# Patient Record
Sex: FEMALE | Race: WHITE | Employment: OTHER | RURAL
[De-identification: names, ages, dates, MRNs, and addresses within clinical notes are randomized per-mention and may not be internally consistent; named-entity substitution may affect disease eponyms.]

---

## 2017-04-26 ENCOUNTER — LAB ONLY (OUTPATIENT)
Dept: FAMILY MEDICINE CLINIC | Age: 82
End: 2017-04-26

## 2017-04-26 DIAGNOSIS — E78.00 PURE HYPERCHOLESTEROLEMIA: Primary | ICD-10-CM

## 2017-04-26 DIAGNOSIS — I10 ESSENTIAL HYPERTENSION: ICD-10-CM

## 2017-04-26 NOTE — MR AVS SNAPSHOT
Visit Information Date & Time Provider Department Dept. Phone Encounter #  
 4/26/2017  9:50 AM CM LIVELY NURSE Teo Lopez [id number] Your Appointments 5/16/2017  3:00 PM  
New Patient with Norbert Angeles MD  
[facility] [address] [facility] Appt Note: s/r (pcp gerda)- ADD ON PER STIBAL FOR SCHEDULE EGD [facility] [address] [facility] [address] [facility], [address] [facility] Upcoming Health Maintenance Date Due DTaP/Tdap/Td series (1 - Tdap) 1/30/1953 ZOSTER VACCINE AGE 60> 1/30/1992 GLAUCOMA SCREENING Q2Y 1/30/1997 Pneumococcal 65+ Low/Medium Risk (1 of 2 - PCV13) 1/30/1997 MEDICARE YEARLY EXAM 1/30/1997 BREAST CANCER SCRN MAMMOGRAM 4/13/2017 Allergies as of 4/26/2017  Review Complete On: 2/12/2016 By: Norah Connelly MD  
  
 Severity Noted Reaction Type Reactions Statins-hmg-coa Reductase Inhibitors  09/04/2013    Other (comments) Severs leg pain(Zetia, Crestor) Current Immunizations  Reviewed on 10/15/2014 Name Date Influenza High Dose Vaccine PF 10/5/2016 Influenza Vaccine 10/13/2014 Not reviewed this visit You Were Diagnosed With   
  
 Codes Comments Pure hypercholesterolemia    -  Primary ICD-10-CM: E78.00 ICD-9-CM: 272.0 Essential hypertension     ICD-10-CM: I10 
ICD-9-CM: 401.9 Vitals OB Status Smoking Status Postmenopausal Former Smoker Preferred Pharmacy Pharmacy Name Phone [address] [facility] [phone] Your Updated Medication List  
  
   
This list is accurate as of: 4/26/17 10:12 AM.  Always use your most recent med list.  
  
  
  
  
 aspirin 81 mg tablet Take 162 mg by mouth. atorvastatin 10 mg tablet Commonly known as:  LIPITOR TAKE 1 TABLET DAILY FOR STROKE PREVENTION AND CHOLESTEROL  
  
 doxycycline 100 mg capsule Commonly known as:  VIBRAMYCIN Take 1 Cap by mouth two (2) times a day. For 7 days  
  
 lisinopril 20 mg tablet Commonly known as:  PRINIVIL, ZESTRIL  
TAKE 1 TABLET DAILY TO PREVENT STROKE AND HEART PROBLEMS PREMPRO 0.3-1.5 mg Tab Generic drug:  estrogen (conjugated)-medroxyPROGESTERone TAKE 1 TABLET DAILY We Performed the Following LIPID PANEL [39808 CPT(R)] METABOLIC PANEL, COMPREHENSIVE [03420 CPT(R)] Introducing [facility] & HEALTH SERVICES! [employer] introduces Carmudi patient portal. Now you can access parts of your medical record, email your doctor's office, and request medication refills online. 1. In your internet browser, go to [email or web address] 2. Click on the First Time User? Click Here link in the Sign In box. You will see the New Member Sign Up page. 3. Enter your Carmudi Access Code exactly as it appears below. You will not need to use this code after youve completed the sign-up process. If you do not sign up before the expiration date, you must request a new code. · Carmudi Access Code: [email or web address] Expires: 7/25/2017 10:12 AM 
 
4. Enter the last four digits of your Social Security Number (xxxx) and Date of Birth (mm/dd/yyyy) as indicated and click Submit. You will be taken to the next sign-up page. 5. Create a Carmudi ID. This will be your Carmudi login ID and cannot be changed, so think of one that is secure and easy to remember. 6. Create a Carmudi password. You can change your password at any time. 7. Enter your Password Reset Question and Answer. This can be used at a later time if you forget your password. 8. Enter your e-mail address. You will receive e-mail notification when new information is available in [address] 9. Click Sign Up. You can now view and download portions of your medical record. 10. Click the Download Summary menu link to download a portable copy of your medical information. If you have questions, please visit the Frequently Asked Questions section of the Polymer Vision website. Remember, Polymer Vision is NOT to be used for urgent needs. For medical emergencies, dial 911. Now available from your iPhone and Android! Please provide this summary of care documentation to your next provider. Your primary care clinician is listed as Mariana Humphrey. If you have any questions after today's visit, please call [phone].

## 2017-04-27 LAB
ALBUMIN SERPL-MCNC: 4.4 G/DL (ref 3.5–4.7)
ALBUMIN/GLOB SERPL: 1.9 {RATIO} (ref 1.2–2.2)
ALP SERPL-CCNC: 46 IU/L (ref 39–117)
ALT SERPL-CCNC: 24 IU/L (ref 0–32)
AST SERPL-CCNC: 27 IU/L (ref 0–40)
BILIRUB SERPL-MCNC: 0.3 MG/DL (ref 0–1.2)
BUN SERPL-MCNC: 16 MG/DL (ref 8–27)
BUN/CREAT SERPL: 22 (ref 12–28)
CALCIUM SERPL-MCNC: 9.4 MG/DL (ref 8.7–10.3)
CHLORIDE SERPL-SCNC: 103 MMOL/L (ref 96–106)
CHOLEST SERPL-MCNC: 200 MG/DL (ref 100–199)
CO2 SERPL-SCNC: 25 MMOL/L (ref 18–29)
CREAT SERPL-MCNC: 0.74 MG/DL (ref 0.57–1)
GLOBULIN SER CALC-MCNC: 2.3 G/DL (ref 1.5–4.5)
GLUCOSE SERPL-MCNC: 93 MG/DL (ref 65–99)
HDLC SERPL-MCNC: 55 MG/DL
LDLC SERPL CALC-MCNC: 107 MG/DL (ref 0–99)
POTASSIUM SERPL-SCNC: 4.2 MMOL/L (ref 3.5–5.2)
PROT SERPL-MCNC: 6.7 G/DL (ref 6–8.5)
SODIUM SERPL-SCNC: 142 MMOL/L (ref 134–144)
TRIGL SERPL-MCNC: 190 MG/DL (ref 0–149)
VLDLC SERPL CALC-MCNC: 38 MG/DL (ref 5–40)

## 2017-05-10 ENCOUNTER — TELEPHONE (OUTPATIENT)
Dept: FAMILY MEDICINE CLINIC | Age: 82
End: 2017-05-10

## 2017-05-16 ENCOUNTER — OFFICE VISIT (OUTPATIENT)
Dept: SURGERY | Age: 82
End: 2017-05-16

## 2017-05-16 VITALS
HEART RATE: 73 BPM | WEIGHT: 131 LBS | HEIGHT: 64 IN | SYSTOLIC BLOOD PRESSURE: 129 MMHG | DIASTOLIC BLOOD PRESSURE: 74 MMHG | BODY MASS INDEX: 22.36 KG/M2

## 2017-05-16 DIAGNOSIS — K21.9 GASTROESOPHAGEAL REFLUX DISEASE, ESOPHAGITIS PRESENCE NOT SPECIFIED: Primary | ICD-10-CM

## 2017-05-16 DIAGNOSIS — Z80.0 FAMILY HX OF COLON CANCER: ICD-10-CM

## 2017-06-06 ENCOUNTER — OFFICE VISIT (OUTPATIENT)
Dept: FAMILY MEDICINE CLINIC | Age: 82
End: 2017-06-06

## 2017-06-06 VITALS
OXYGEN SATURATION: 98 % | BODY MASS INDEX: 22.62 KG/M2 | SYSTOLIC BLOOD PRESSURE: 135 MMHG | HEART RATE: 82 BPM | RESPIRATION RATE: 19 BRPM | DIASTOLIC BLOOD PRESSURE: 72 MMHG | WEIGHT: 131.8 LBS

## 2017-06-06 DIAGNOSIS — R20.2 NUMBNESS AND TINGLING: ICD-10-CM

## 2017-06-06 DIAGNOSIS — F41.9 ANXIETY: Primary | ICD-10-CM

## 2017-06-06 DIAGNOSIS — I10 ESSENTIAL HYPERTENSION: ICD-10-CM

## 2017-06-06 DIAGNOSIS — R20.0 NUMBNESS AND TINGLING: ICD-10-CM

## 2017-06-06 DIAGNOSIS — Z86.73 HISTORY OF CVA (CEREBROVASCULAR ACCIDENT): ICD-10-CM

## 2017-06-06 RX ORDER — ESCITALOPRAM OXALATE 5 MG/1
5 TABLET ORAL DAILY
Qty: 30 TAB | Refills: 2 | Status: SHIPPED | OUTPATIENT
Start: 2017-06-06 | End: 2017-06-13

## 2017-06-06 RX ORDER — ESCITALOPRAM OXALATE 5 MG/1
5 TABLET ORAL DAILY
Qty: 30 TAB | Refills: 1 | Status: SHIPPED | OUTPATIENT
Start: 2017-06-06 | End: 2017-06-13 | Stop reason: SDUPTHER

## 2017-06-06 NOTE — MR AVS SNAPSHOT
Visit Information Date & Time Provider Department Dept. Phone Encounter #  
 6/6/2017  9:00 AM Koki Pedraza NP [address] [id number] Upcoming Health Maintenance Date Due DTaP/Tdap/Td series (1 - Tdap) 1[birth year]30/1953 ZOSTER VACCINE AGE 60> 1/30/1992 GLAUCOMA SCREENING Q2Y 1/30/1997 Pneumococcal 65+ Low/Medium Risk (1 of 2 - PCV13) 1/30/1997 MEDICARE YEARLY EXAM 1/30/1997 INFLUENZA AGE 9 TO ADULT 8/1/2017 BREAST CANCER SCRN MAMMOGRAM 4/27/2018 Allergies as of 6/6/2017  Review Complete On: 6/6/2017 By: Koki Pedraza NP Severity Noted Reaction Type Reactions Statins-hmg-coa Reductase Inhibitors  09/04/2013    Other (comments) Severs leg pain(Zetia, Crestor) Current Immunizations  Reviewed on 10/15/2014 Name Date Influenza High Dose Vaccine PF 10/5/2016 Influenza Vaccine 10/13/2014 Not reviewed this visit You Were Diagnosed With   
  
 Codes Comments Anxiety    -  Primary ICD-10-CM: F41.9 ICD-9-CM: 300.00 Vitals BP Pulse Resp Weight(growth percentile) SpO2 BMI  
 135/72 (BP 1 Location: Left arm, BP Patient Position: Sitting) 82 19 131 lb 12.8 oz (59.8 kg) 98% 22.62 kg/m2 OB Status Smoking Status Postmenopausal Former Smoker Vitals History BMI and BSA Data Body Mass Index Body Surface Area  
 22.62 kg/m 2 1.64 m 2 Preferred Pharmacy Pharmacy Name Phone [id number], [address] [facility] [address] [place] [phone] Your Updated Medication List  
  
   
This list is accurate as of: 6/6/17 10:24 AM.  Always use your most recent med list.  
  
  
  
  
 aspirin 81 mg tablet Take 162 mg by mouth. atorvastatin 10 mg tablet Commonly known as:  LIPITOR  
TAKE 1 TABLET DAILY FOR STROKE PREVENTION AND CHOLESTEROL * escitalopram oxalate 5 mg tablet Commonly known as:  Danie Ziegler Take 1 Tab by mouth daily. * escitalopram oxalate 5 mg tablet Commonly known as:  Faina Ángel Take 1 Tab by mouth daily. lisinopril 20 mg tablet Commonly known as:  PRINIVIL, ZESTRIL  
TAKE 1 TABLET DAILY TO PREVENT STROKE AND HEART PROBLEMS PREMPRO 0.3-1.5 mg Tab Generic drug:  estrogen (conjugated)-medroxyPROGESTERone TAKE 1 TABLET DAILY * Notice: This list has 2 medication(s) that are the same as other medications prescribed for you. Read the directions carefully, and ask your doctor or other care provider to review them with you. Prescriptions Sent to Pharmacy Refills  
 escitalopram oxalate (LEXAPRO) 5 mg tablet 1 Sig: Take 1 Tab by mouth daily. Class: Normal  
 Pharmacy: [address], [address] [facility] Ph #: [phone] Route: Oral  
 escitalopram oxalate (LEXAPRO) 5 mg tablet 2 Sig: Take 1 Tab by mouth daily. Class: Normal  
 Pharmacy: [facility]pharm[facility] #[id number] [facility], [address] Ph #: [phone] Route: Oral  
  
Patient Instructions If you have any questions regarding SkillWiz, you may call SkillWiz support at [phone]. Introducing [facility]! [employer] introduces Anywhere to Go patient portal. Now you can access parts of your medical record, email your doctor's office, and request medication refills online. 1. In your internet browser, go to [email or web address] 2. Click on the First Time User? Click Here link in the Sign In box. You will see the New Member Sign Up page. 3. Enter your Anywhere to Go Access Code exactly as it appears below. You will not need to use this code after youve completed the sign-up process. If you do not sign up before the expiration date, you must request a new code. · Anywhere to Go Access Code: [email or web address] Expires: 7/25/2017 10:12 AM 
 
4.  Enter the last four digits of your Social Security Number (xxxx) and Date of Birth (mm/dd/yyyy) as indicated and click Submit. You will be taken to the next sign-up page. 5. Create a Relead ID. This will be your Relead login ID and cannot be changed, so think of one that is secure and easy to remember. 6. Create a Relead password. You can change your password at any time. 7. Enter your Password Reset Question and Answer. This can be used at a later time if you forget your password. 8. Enter your e-mail address. You will receive e-mail notification when new information is available in [address] 9. Click Sign Up. You can now view and download portions of your medical record. 10. Click the Download Summary menu link to download a portable copy of your medical information. If you have questions, please visit the Frequently Asked Questions section of the Relead website. Remember, Relead is NOT to be used for urgent needs. For medical emergencies, dial 911. Now available from your iPhone and Android! Please provide this summary of care documentation to your next provider. Your primary care clinician is listed as Kiana Humphrey. If you have any questions after today's visit, please call [phone].

## 2017-06-06 NOTE — PROGRESS NOTES
6/12/2017    Chief Complaint   Patient presents with   [facility] follow up,  has cancer and she feels that her nerves are shot all the time.  Numbness     numbness and tingling on the left side of her body all the way down, including mouth.  Bloated     patient rescheduled colonscopy due to  going through cancer right now. HPI: Rosas Null is a 80 y.o. female. ED f/u for numbness-paresthesias left side, left arm. CT non-contrast was done: Periventricular hypoattenuation suggesting microvascular ischemic change. Diagnosed as Anxiety/Stress Reaction. She wants something to help her with stress reaction. She has been advised to get Xanex. She has a son who is a physician. She is accompanied by her granddaughter. She continues to have parethesias primarily left side, arm. She denies extremity weakness, slurred speech, and facial drooping. Her  is in treatment for cancer. He has several appts next week with oncology etc.   She was scheduled for a colonoscopy which she cancelled until after her 's situation calms down. Her first and only priority right now is him. Allergies   Allergen Reactions    Statins-Hmg-Coa Reductase Inhibitors Other (comments)     Severs leg pain(Zetia, Crestor)       Current Outpatient Prescriptions   Medication Sig Dispense Refill    escitalopram oxalate (LEXAPRO) 5 mg tablet Take 1 Tab by mouth daily. 30 Tab 1    escitalopram oxalate (LEXAPRO) 5 mg tablet Take 1 Tab by mouth daily. 30 Tab 2    lisinopril (PRINIVIL, ZESTRIL) 20 mg tablet TAKE 1 TABLET DAILY TO PREVENT STROKE AND HEART PROBLEMS 90 Tab 0    atorvastatin (LIPITOR) 10 mg tablet TAKE 1 TABLET DAILY FOR STROKE PREVENTION AND CHOLESTEROL 90 Tab 2    aspirin 81 mg tablet Take 162 mg by mouth.       PREMPRO 0.3-1.5 mg tab TAKE 1 TABLET DAILY 80 Tab 2       Past Medical History:   Diagnosis Date    Hypercholesterolemia     Stroke [facility]) 2011 Lab Results   Component Value Date/Time    Glucose 84 06/03/2017 06:45 PM    LDL, calculated 107 04/26/2017 10:09 AM    Creatinine 0.91 06/03/2017 06:45 PM       ROS:  Constitutional: No fever, chills or weight loss  Respiratory: No cough, SOB   CV: No chest pain or Palpitations  GI: No nausea, vomiting or diarrhea. : No dysuria or hematuria. Neuro: No headaches, seizures, change in mental status. Physical Exam:   VS Visit Vitals    /72 (BP 1 Location: Left arm, BP Patient Position: Sitting)    Pulse 82    Resp 19    Wt 131 lb 12.8 oz (59.8 kg)    SpO2 98%    BMI 22.62 kg/m2      General Alert,oriented X 3. NAD. Ambulates independently with steady gait. Eyes Conjunctiva and lids normal.    PERRLA, EOMI.   ENMT Mucous membranes moist.    Oropharynx: no erythema, no exudates, no lesions, normal tongue. NECK Thyroid: normal size, nontender. Trachea midline, neck symmetrical and without masses. Carotids 2+ with no bruits. No enlarged nodes. RESP Clear to auscultation and percussion. No rales, wheezes, rhonchi, or rubs. CV RRR, with no S3 or S4, no murmur, no rub. GI   Normal bowel sounds, no bruit, soft, nontender, without masses. MSKEL Normal gait and station. Normal strength and tone, no atrophy. EXT No deformity. Extremities without edema. DP and PT 2+ bilaterally. SKIN Skin warm, normal turgor.    NEURO Neurologic:  Gen: Attention normal  Language: naming, repetition, fluency normal  Memory: intact recent and remote memory  Cranial Nerves:  I: smell  Not tested    II: visual fields  Full to confrontation    II: pupils  Equal, round, reactive to light    II: optic disc  No papilledema    III,VII: ptosis  none    III,IV,VI: extraocular muscles  Full ROM    V: mastication  normal    V: facial light touch sensation  normal    VII: facial muscle function  symmetric    VIII: hearing  symmetric    IX: soft palate elevation  normal    XI: trapezius strength  5/5    XI: sternocleidomastoid strength  5/5    XI: neck flexion strength  5/5    XII: tongue  midline      Motor: normal bulk and tone, no tremor  Strength: 5/5 all four extremities  Sensory: intact to LT, PP, vibration, and temperature  Coordination: FTN intact, Rhomberg negative  Gait: normal gait including tandem   Reflexes: 2+ throughout        [facility] Judgment and insight good. Fund of knowledge is normal.   Affect is alert and attentive. 1. Anxiety  Spouse with cancer. Situational Anxiety, she is deferring her own needs to care for her . Offer support. - escitalopram oxalate (LEXAPRO) 5 mg tablet; Take 1 Tab by mouth daily. Dispense: 30 Tab; Refill: 2    2. Left sided parathesia  Do not want to assume that this is a stress/anxiety reaction. History of Previous CVA, I have no records of this episode. Discussed imaging. She does not want to schedule  Right now. She has several up-coming appts with her . Consider MRI-MRA - Basal artery insufficiency. She declines scheduling this visit. She will contact us if her symptoms increase or persist.     Orders Placed This Encounter    escitalopram oxalate (LEXAPRO) 5 mg tablet     Sig: Take 1 Tab by mouth daily. Dispense:  30 Tab     Refill:  1    escitalopram oxalate (LEXAPRO) 5 mg tablet     Sig: Take 1 Tab by mouth daily. Dispense:  30 Tab     Refill:  2     Time spent with pt: 30 min. Follow-up Disposition:  Return in about 4 weeks (around 7/4/2017).         KAM Paz

## 2017-06-13 ENCOUNTER — OFFICE VISIT (OUTPATIENT)
Dept: FAMILY MEDICINE CLINIC | Age: 82
End: 2017-06-13

## 2017-06-13 VITALS
RESPIRATION RATE: 19 BRPM | DIASTOLIC BLOOD PRESSURE: 78 MMHG | HEART RATE: 88 BPM | SYSTOLIC BLOOD PRESSURE: 136 MMHG | OXYGEN SATURATION: 95 % | HEIGHT: 64 IN | WEIGHT: 129.6 LBS | TEMPERATURE: 95.9 F | BODY MASS INDEX: 22.13 KG/M2

## 2017-06-13 DIAGNOSIS — Z71.89 ADVANCED DIRECTIVES, COUNSELING/DISCUSSION: ICD-10-CM

## 2017-06-13 DIAGNOSIS — I63.9 CEREBROVASCULAR ACCIDENT (CVA), UNSPECIFIED MECHANISM (HCC): ICD-10-CM

## 2017-06-13 DIAGNOSIS — E78.00 PURE HYPERCHOLESTEROLEMIA: ICD-10-CM

## 2017-06-13 DIAGNOSIS — Z00.00 MEDICARE ANNUAL WELLNESS VISIT, SUBSEQUENT: Primary | ICD-10-CM

## 2017-06-13 DIAGNOSIS — R20.2 NUMBNESS AND TINGLING: ICD-10-CM

## 2017-06-13 DIAGNOSIS — F41.9 ANXIETY: ICD-10-CM

## 2017-06-13 DIAGNOSIS — I10 ESSENTIAL HYPERTENSION: ICD-10-CM

## 2017-06-13 DIAGNOSIS — R20.0 NUMBNESS AND TINGLING: ICD-10-CM

## 2017-06-13 RX ORDER — BISMUTH SUBSALICYLATE 262 MG
1 TABLET,CHEWABLE ORAL DAILY
COMMUNITY

## 2017-06-13 RX ORDER — ATORVASTATIN CALCIUM 20 MG/1
20 TABLET, FILM COATED ORAL
Qty: 90 TAB | Refills: 3 | Status: SHIPPED | OUTPATIENT
Start: 2017-06-13 | End: 2017-06-27 | Stop reason: SDUPTHER

## 2017-06-13 RX ORDER — ALPRAZOLAM 0.25 MG/1
0.25 TABLET ORAL
Qty: 60 TAB | Refills: 0 | Status: SHIPPED | OUTPATIENT
Start: 2017-06-13 | End: 2017-07-18

## 2017-06-13 RX ORDER — ESCITALOPRAM OXALATE 10 MG/1
10 TABLET ORAL DAILY
Qty: 30 TAB | Refills: 5 | Status: SHIPPED | OUTPATIENT
Start: 2017-06-13 | End: 2017-06-27 | Stop reason: SDUPTHER

## 2017-06-13 NOTE — MR AVS SNAPSHOT
Visit Information Date & Time Provider Department Dept. Phone Encounter #  
 6/13/2017 10:00 AM Dustin Gupta NP [address] [id number] Upcoming Health Maintenance Date Due ZOSTER VACCINE AGE 60> 1/30/1992 GLAUCOMA SCREENING Q2Y 1/30/1997 Pneumococcal 65+ Low/Medium Risk (1 of 2 - PCV13) 1/30/1997 MEDICARE YEARLY EXAM 1/30/1997 INFLUENZA AGE 9 TO ADULT 8/1/2017 BREAST CANCER SCRN MAMMOGRAM 4/27/2018 DTaP/Tdap/Td series (2 - Td) 6/13/2027 Allergies as of 6/13/2017  Review Complete On: 6/13/2017 By: Dustin Gupta NP Severity Noted Reaction Type Reactions Statins-hmg-coa Reductase Inhibitors  09/04/2013    Other (comments) Severs leg pain(Zetia, Crestor) Current Immunizations  Reviewed on 6/13/2017 Name Date Influenza High Dose Vaccine PF 10/5/2016 Influenza Vaccine 10/13/2014 Reviewed by Sal Lozada on 6/13/2017 at 10:04 AM  
 Reviewed by Dustin Gupta NP on 6/13/2017 at 11:11 AM  
You Were Diagnosed With   
  
 Codes Comments Medicare annual wellness visit, subsequent    -  Primary ICD-10-CM: Z00.00 ICD-9-CM: V70.0 Essential hypertension     ICD-10-CM: I10 
ICD-9-CM: 401.9 Anxiety     ICD-10-CM: F41.9 ICD-9-CM: 300.00 Numbness and tingling     ICD-10-CM: R20.0, R20.2 ICD-9-CM: 782.0 Pure hypercholesterolemia     ICD-10-CM: E78.00 ICD-9-CM: 272.0 Cerebrovascular accident (CVA), unspecified mechanism ([facility] [address]     ICD-10-CM: I63.9 ICD-9-CM: 434.91 Vitals BP Pulse Temp Resp Height(growth percentile) Weight(growth percentile) 136/78 (BP 1 Location: Left arm, BP Patient Position: Sitting) 88 95.9 °F (35.5 °C) (Oral) 19 5' 4\" (1.626 m) 129 lb 9.6 oz (58.8 kg) SpO2 BMI OB Status Smoking Status 95% 22.25 kg/m2 Postmenopausal Former Smoker BMI and BSA Data  Body Mass Index Body Surface Area  
 22.25 kg/m 2 1.63 m 2  
  
  
 Preferred Pharmacy Pharmacy Name Phone [id number] [phone] Your Updated Medication List  
  
   
This list is accurate as of: 6/13/17 11:48 AM.  Always use your most recent med list.  
  
  
  
  
 ALPRAZolam 0.25 mg tablet Commonly known as:  Woo Fuse Take 1 Tab by mouth two (2) times daily as needed for Anxiety. Max Daily Amount: 0.5 mg.  
  
 aspirin 81 mg tablet Take 162 mg by mouth. atorvastatin 20 mg tablet Commonly known as:  LIPITOR Take 1 Tab by mouth nightly. escitalopram oxalate 10 mg tablet Commonly known as:  Lovenia Eng Take 1 Tab by mouth daily. lisinopril 20 mg tablet Commonly known as:  PRINIVIL, ZESTRIL  
TAKE 1 TABLET DAILY TO PREVENT STROKE AND HEART PROBLEMS  
  
 multivitamin tablet Commonly known as:  ONE A DAY Take 1 Tab by mouth daily. PREMPRO 0.3-1.5 mg Tab Generic drug:  estrogen (conjugated)-medroxyPROGESTERone TAKE 1 TABLET DAILY Prescriptions Printed Refills ALPRAZolam (XANAX) 0.25 mg tablet 0 Sig: Take 1 Tab by mouth two (2) times daily as needed for Anxiety. Max Daily Amount: 0.5 mg.  
 Class: Print Route: Oral  
  
Prescriptions Sent to Pharmacy Refills  
 escitalopram oxalate (LEXAPRO) 10 mg tablet 5 Sig: Take 1 Tab by mouth daily. Class: Normal  
 Pharmacy: [facility]/pharmacy #[id number] [address] Ph #: [phone] Route: Oral  
 atorvastatin (LIPITOR) 20 mg tablet 3 Sig: Take 1 Tab by mouth nightly. Class: Normal  
 Pharmacy: [facility]/pharmacy #[id number] Veronica Berhane, [address] [place] [address] [address] Ph #: [phone] Route: Oral  
  
Introducing [facility] & HE[facility] SERVICES! Atiya Olivo introduces [employer] patient portal. Now you can access parts of your medical record, email your doctor's office, and request medication refills online.    
 
1. In your internet browser, go to [email or web address] 2. Click on the First Time User? Click Here link in the Sign In box. You will see the New Member Sign Up page. 3. Enter your Ubiquigent Access Code exactly as it appears below. You will not need to use this code after youve completed the sign-up process. If you do not sign up before the expiration date, you must request a new code. · Ubiquigent Access Code: [email or web address] Expires: 7/25/2017 10:12 AM 
 
4. Enter the last four digits of your Social Security Number (xxxx) and Date of Birth (mm/dd/yyyy) as indicated and click Submit. You will be taken to the next sign-up page. 5. Create a Ostrovokt ID. This will be your Ubiquigent login ID and cannot be changed, so think of one that is secure and easy to remember. 6. Create a Ubiquigent password. You can change your password at any time. 7. Enter your Password Reset Question and Answer. This can be used at a later time if you forget your password. 8. Enter your e-mail address. You will receive e-mail notification when new information is available in [address] 9. Click Sign Up. You can now view and download portions of your medical record. 10. Click the Download Summary menu link to download a portable copy of your medical information. If you have questions, please visit the Frequently Asked Questions section of the Ubiquigent website. Remember, Ubiquigent is NOT to be used for urgent needs. For medical emergencies, dial 911. Now available from your iPhone and Android! Please provide this summary of care documentation to your next provider. Your primary care clinician is listed as Bogdan Humphrey. If you have any questions after today's visit, please call [phone].

## 2017-06-13 NOTE — PROGRESS NOTES
Riana Kay is a 80 y.o. female and presents for annual Medicare Wellness Visit. Aged out of screenings. Has AD:  Vishal Fan. Son. [address] [facility]  [phone] (c)     Problem List: Reviewed with patient and discussed risk factors. Patient Active Problem List   Diagnosis Code    OA (osteoarthritis) M19.90    Stroke ([facility] [address] I63.9    Menopausal state N95.1    Other and unspecified hyperlipidemia E78.5    HTN (hypertension) I10    Hyperlipidemia E78.5       Current medical providers:  Patient Care Team:  John Marshall MD as PCP - General (Family Practice)  Priyanka Arias MD (Surgery)    PSH: Reviewed with patient  Past Surgical History:   Procedure Laterality Date    HX COLONOSCOPY      HX GYN       VI,Para V,SAB i        SH: Reviewed with patient  Social History   Substance Use Topics    Smoking status: Former Smoker     Quit date: 1990    Smokeless tobacco: None    Alcohol use Yes       FH: Reviewed with patient  Family History   Problem Relation Age of Onset    No Known Problems Mother        Medications/Allergies: Reviewed with patient  Current Outpatient Prescriptions on File Prior to Visit   Medication Sig Dispense Refill    escitalopram oxalate (LEXAPRO) 5 mg tablet Take 1 Tab by mouth daily. 30 Tab 1    atorvastatin (LIPITOR) 10 mg tablet TAKE 1 TABLET DAILY FOR STROKE PREVENTION AND CHOLESTEROL 90 Tab 2    PREMPRO 0.3-1.5 mg tab TAKE 1 TABLET DAILY 84 Tab 2    aspirin 81 mg tablet Take 162 mg by mouth.  lisinopril (PRINIVIL, ZESTRIL) 20 mg tablet TAKE 1 TABLET DAILY TO PREVENT STROKE AND HEART PROBLEMS 90 Tab 0     No current facility-administered medications on file prior to visit.        Allergies   Allergen Reactions    Statins-Hmg-Coa Reductase Inhibitors Other (comments)     Severs leg pain(Zetia, Crestor)       Objective:  Visit Vitals    /78 (BP 1 Location: Left arm, BP Patient Position: Sitting)    Pulse 88    Temp 95.9 °F (35.5 °C) (Oral)    Resp 19    Ht 5' 4\" (1.626 m)    Wt 129 lb 9.6 oz (58.8 kg)    SpO2 95%    BMI 22.25 kg/m2    Body mass index is 22.25 kg/(m^2). Assessment of cognitive impairment: Alert and oriented x 4    Depression Screen:   PHQ over the last two weeks 9/28/2015   Little interest or pleasure in doing things Not at all   Feeling down, depressed or hopeless Not at all   Total Score PHQ 2 0       Fall Risk Assessment:    Fall Risk Assessment, last 12 mths 2/12/2016   Able to walk? Yes   Fall in past 12 months? No       Functional Ability:   Does the patient exhibit a steady gait? yes   How long did it take the patient to get up and walk from a sitting position? 7 seconds   Is the patient self reliant?  (ie can do own laundry, meals, household chores)  yes     Does the patient handle his/her own medications? yes     Does the patient handle his/her own money? yes     Is the patients home safe (ie good lighting, handrails on stairs and bath, etc.)? yes     Did you notice or did patient express any hearing difficulties? no     Did you notice or did patient express any vision difficulties?   no     Were distance and reading eye charts used? no       Advance Care Planning:   Patient was offered the opportunity to discuss advance care planning:  yes     Does patient have an Advance Directive:  no   If no, did you provide information on [facility] Connections? yes       Plan:  1. Medicare Wellness, Subsequent  Aged out of screenings. Declines pneumonia vaccine. 2. Advanced Directive  Has an AD. Rayray Kamara. Son. [address]  [phone] (c)   Does not want any aggressive life prolonging treatments to prolong her life. Will bring copy of AD.        Orders Placed This Encounter    multivitamin (ONE A DAY) tablet       Health Maintenance   Topic Date Due    ZOSTER VACCINE AGE 60>  01/30/1992    GLAUCOMA SCREENING Q2Y  01/30/1997    Pneumococcal 65+ Low/Medium Risk (1 of 2 - PCV13) 01/30/1997    MEDICARE YEARLY EXAM  01/30/1997    INFLUENZA AGE 9 TO ADULT  08/01/2017    BREAST CANCER SCRN MAMMOGRAM  04/27/2018    DTaP/Tdap/Td series (2 - Td) 06/13/2027    OSTEOPOROSIS SCREENING (DEXA)  Completed       *Patient verbalized understanding and agreement with the plan. A copy of the After Visit Summary with personalized health plan was given to the patient today.

## 2017-06-18 NOTE — PROGRESS NOTES
6/13/2017    Chief Complaint   Patient presents with    Annual Wellness Visit     wants to discuss anxiety    Numbness     complete left side of body. HPI: Liliana Fishman is a 80 y.o. female. 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 Her  is still hospitalized for cancer treatment. She has had a lot of anxiety related to his illness. He successfully underwent gamma knife for a brain tumor. He is going to kneed some skilled/rehab at discharge. She would like to have him go to [facility] if possible. Ultimately her goal is to bring him home, die at home. She was seen 1 wk ago for ED f/u of numbness of her left hand, face and left side. CT scan was neg. DX was anxiety reaction. She continues to have left sided numbness. She declined additional imaging, recommended MRI/MRA. She is on Lexapro 5mg for anxiety. She is having some difficulty sleeping. Allergies   Allergen Reactions    Statins-Hmg-Coa Reductase Inhibitors Other (comments)     Severs leg pain(Zetia, Crestor)       Current Outpatient Prescriptions   Medication Sig Dispense Refill    multivitamin (ONE A DAY) tablet Take 1 Tab by mouth daily.  escitalopram oxalate (LEXAPRO) 10 mg tablet Take 1 Tab by mouth daily. 30 Tab 5    ALPRAZolam (XANAX) 0.25 mg tablet Take 1 Tab by mouth two (2) times daily as needed for Anxiety. Max Daily Amount: 0.5 mg. 60 Tab 0    atorvastatin (LIPITOR) 20 mg tablet Take 1 Tab by mouth nightly. 90 Tab 3    PREMPRO 0.3-1.5 mg tab TAKE 1 TABLET DAILY 84 Tab 2    aspirin 81 mg tablet Take 162 mg by mouth.       lisinopril (PRINIVIL, ZESTRIL) 20 mg tablet TAKE 1 TABLET DAILY TO PREVENT STROKE AND HEART PROBLEMS 90 Tab 0       Past Medical History:   Diagnosis Date    Hypercholesterolemia     Stroke [facility]) 2011       Lab Results   Component Value Date/Time    Glucose 84 06/03/2017 06:45 PM    LDL, calculated 107 04/26/2017 10:09 AM    Creatinine 0.91 06/03/2017 06:45 PM ROS:  Constitutional: No fever, chills or weight loss  Respiratory: No cough, SOB   CV: No chest pain or Palpitations  GI: No nausea, vomiting or diarrhea. : No dysuria or hematuria. Neuro: No headaches, seizures, change in mental status. Physical Exam:   VS Visit Vitals    /78 (BP 1 Location: Left arm, BP Patient Position: Sitting)    Pulse 88    Temp 95.9 °F (35.5 °C) (Oral)    Resp 19    Ht 5' 4\" (1.626 m)    Wt 129 lb 9.6 oz (58.8 kg)    SpO2 95%    BMI 22.25 kg/m2      General Alert,oriented X 3. NAD. Ambulates independently with steady gait. Eyes Conjunctiva and lids normal.    PERRLA, EOMI.   ENMT Mucous membranes moist.    Oropharynx: no erythema, no exudates, no lesions, normal tongue. NECK Thyroid: normal size, nontender. Trachea midline, neck symmetrical and without masses. Carotids 2+ with no bruits. No enlarged nodes. RESP Clear to auscultation and percussion. No rales, wheezes, rhonchi, or rubs. CV RRR, with no S3 or S4, no murmur, no rub. GI   Normal bowel sounds, no bruit, soft, nontender, without masses. MSKEL Normal gait and station. Normal strength and tone, no atrophy. EXT No deformity. Extremities without edema. DP and PT 2+ bilaterally. SKIN Skin warm, normal turgor. NEURO Cranial nerves normal 2-12. No abnormal movement   PSYCH Judgment and insight good. Fund of knowledge is normal.   Affect is alert and attentive. 1. Medicare annual wellness visit, subsequent  Aged out of screenings. Declines vaccines. 2. Essential hypertension  Stable   Well controlled  Continue current regimen    3. Anxiety  Increase Lexapro to 10mg  Add low dose Alprazolam PRN. - escitalopram oxalate (LEXAPRO) 10 mg tablet; Take 1 Tab by mouth daily. Dispense: 30 Tab; Refill: 5  - ALPRAZolam (XANAX) 0.25 mg tablet; Take 1 Tab by mouth two (2) times daily as needed for Anxiety. Max Daily Amount: 0.5 mg.  Dispense: 60 Tab;  Refill: 0    4. Numbness and tingling  Refill     5. Pure hypercholesterolemia  Refill, increase dose. - atorvastatin (LIPITOR) 20 mg tablet; Take 1 Tab by mouth nightly. Dispense: 90 Tab; Refill: 3    6. Cerebrovascular accident (CVA), unspecified mechanism ([address]  Refill, increase dose. - atorvastatin (LIPITOR) 20 mg tablet; Take 1 Tab by mouth nightly. Dispense: 90 Tab; Refill: 3    7. Advanced directives, counseling/discussion  She has AD  Son is her POA. Orders Placed This Encounter    multivitamin (ONE A DAY) tablet     Sig: Take 1 Tab by mouth daily.  escitalopram oxalate (LEXAPRO) 10 mg tablet     Sig: Take 1 Tab by mouth daily. Dispense:  30 Tab     Refill:  5    ALPRAZolam (XANAX) 0.25 mg tablet     Sig: Take 1 Tab by mouth two (2) times daily as needed for Anxiety. Max Daily Amount: 0.5 mg.     Dispense:  60 Tab     Refill:  0    atorvastatin (LIPITOR) 20 mg tablet     Sig: Take 1 Tab by mouth nightly. Dispense:  90 Tab     Refill:  3       Follow-up Disposition:  Return in about 3 weeks (around 7/4/2017). Time spent with patient: 30 min.     KAM Vail

## 2017-06-18 NOTE — ACP (ADVANCE CARE PLANNING)
6/13/2017    Has an Advanced directive. Thais Garcia. Son. [address]  [phone] (c)   Does not want any aggressive life prolonging treatments to prolong her life.

## 2017-06-27 ENCOUNTER — OFFICE VISIT (OUTPATIENT)
Dept: FAMILY MEDICINE CLINIC | Age: 82
End: 2017-06-27

## 2017-06-27 VITALS
DIASTOLIC BLOOD PRESSURE: 86 MMHG | OXYGEN SATURATION: 98 % | HEIGHT: 64 IN | RESPIRATION RATE: 19 BRPM | SYSTOLIC BLOOD PRESSURE: 135 MMHG | HEART RATE: 75 BPM | BODY MASS INDEX: 21.89 KG/M2 | WEIGHT: 128.2 LBS

## 2017-06-27 DIAGNOSIS — F41.9 ANXIETY: ICD-10-CM

## 2017-06-27 DIAGNOSIS — I63.9 CEREBROVASCULAR ACCIDENT (CVA), UNSPECIFIED MECHANISM (HCC): ICD-10-CM

## 2017-06-27 DIAGNOSIS — E78.00 PURE HYPERCHOLESTEROLEMIA: ICD-10-CM

## 2017-06-27 DIAGNOSIS — I10 ESSENTIAL HYPERTENSION: Primary | ICD-10-CM

## 2017-06-27 NOTE — PROGRESS NOTES
6/27/2017    Chief Complaint   Patient presents with    Anxiety     2 week followup        HPI: Dimitris Aragon is a 80 y.o. female. 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 Her  just discharged from hospital for cancer treatment. He needs a CT of the brain and chest this week for his on-going care. Dr. Unruly Kapadia is his oncologist, Dr. Belen Medrano did the gamma knife procedure. We will contact their office and help facilitate his needs. Mrs. Linder's primary objective at this time is for her 's care. She is on Lexapro for anxiety. Her dose was increased to 10 mg. Her anxiety is improved. She has only taken 1 Xanex. Feels she does not really need it at this point, it made her a little drowsy and Lexapro is helping. Her daughter who is with her today is returning to [place] tomorrow. Her granddaughter will be with her after today. She continues to have some numbness of the left side. She does not want to pursue any further evaluation right now until her  is better. She is realistic about his prognosis. Both her  and her family find hope in his getting started on the immunotherapy infusions per Dr. Unruly Kapadia. Allergies   Allergen Reactions    Statins-Hmg-Coa Reductase Inhibitors Other (comments)     Severs leg pain(Zetia, Crestor)       Current Outpatient Prescriptions   Medication Sig Dispense Refill    lisinopril (PRINIVIL, ZESTRIL) 20 mg tablet TAKE 1 TABLET DAILY TO PREVENT STROKE AND HEART PROBLEMS 90 Tab 2    multivitamin (ONE A DAY) tablet Take 1 Tab by mouth daily.  escitalopram oxalate (LEXAPRO) 10 mg tablet Take 1 Tab by mouth daily. 30 Tab 5    atorvastatin (LIPITOR) 20 mg tablet Take 1 Tab by mouth nightly. 90 Tab 3    aspirin 81 mg tablet Take 162 mg by mouth.  ALPRAZolam (XANAX) 0.25 mg tablet Take 1 Tab by mouth two (2) times daily as needed for Anxiety.  Max Daily Amount: 0.5 mg. 60 Tab 0    PREMPRO 0.3-1.5 mg tab TAKE 1 TABLET DAILY 84 Tab 2       Past Medical History:   Diagnosis Date    Hypercholesterolemia     Stroke [facility]) 2011       Lab Results   Component Value Date/Time    Glucose 84 06/03/2017 06:45 PM    LDL, calculated 107 04/26/2017 10:09 AM    Creatinine 0.91 06/03/2017 06:45 PM       ROS:  Constitutional: No fever, chills or weight loss  Respiratory: No cough, SOB   CV: No chest pain or Palpitations  GI: No nausea, vomiting or diarrhea. : No dysuria or hematuria. Neuro: No headaches, seizures, change in mental status. Physical Exam:   VS Visit Vitals    /86 (BP 1 Location: Right arm, BP Patient Position: Sitting)    Pulse 75    Resp 19    Ht 5' 4\" (1.626 m)    Wt 128 lb 3.2 oz (58.2 kg)    SpO2 98%    BMI 22.01 kg/m2      General Alert,oriented X 3. NAD. Ambulates independently with steady gait. Eyes Conjunctiva and lids normal.    PERRLA, EOMI.   ENMT Mucous membranes moist.    Oropharynx: no erythema, no exudates, no lesions, normal tongue. NECK Thyroid: normal size, nontender. Trachea midline, neck symmetrical and without masses. Carotids 2+ with no bruits. No enlarged nodes. RESP Clear to auscultation and percussion. No rales, wheezes, rhonchi, or rubs. CV RRR, with no S3 or S4, no murmur, no rub. GI   Normal bowel sounds, no bruit, soft, nontender, without masses. MSKEL Normal gait and station. Normal strength and tone, no atrophy. EXT No deformity. Extremities without edema. DP and PT 2+ bilaterally. SKIN Skin warm, normal turgor. NEURO Cranial nerves normal 2-12. No abnormal movement   PSYCH Judgment and insight good. Fund of knowledge is normal.   Affect is alert and attentive. 1. Essential hypertension  Stable  Continue current regimen. 2. Cerebrovascular accident (CVA), unspecified mechanism ([address]  2013. Concerned that her recent numbness of the left side is cerebrovascular. She declines further evaluation at this time.  She appears neurologically stable, no change in symptoms. 3. Anxiety  Improved on [id number]. Xanex 0.25mg PRN, she has only taken 1 dose and does not feel she will need to take it. She has available. No orders of the defined types were placed in this encounter. Follow-up Disposition:  Return in about 4 weeks (around 7/25/2017). Time spent with pt. 30 min, counseling and pt education.      KAM Vail

## 2017-06-27 NOTE — MR AVS SNAPSHOT
Visit Information Date & Time Provider Department Dept. Phone Encounter #  
 6/27/2017 10:00 AM Jena Jean Baptiste, [facility] [address] [id number] Upcoming Health Maintenance Date Due INFLUENZA AGE 9 TO ADULT 8/1/2017 BREAST CANCER SCRN MAMMOGRAM 4/27/2018 Pneumococcal 65+ Low/Medium Risk (2 of 2 - PPSV23) 6/13/2018 MEDICARE YEARLY EXAM 6/14/2018 GLAUCOMA SCREENING Q2Y 6/13/2019 DTaP/Tdap/Td series (2 - Td) 6/13/2027 Allergies as of 6/27/2017  Review Complete On: 6/27/2017 By: Tish Moraes Severity Noted Reaction Type Reactions Statins-hmg-coa Reductase Inhibitors  09/04/2013    Other (comments) Severs leg pain(Zetia, Crestor) Current Immunizations  Reviewed on 6/13/2017 Name Date Influenza High Dose Vaccine PF 10/5/2016 Influenza Vaccine 10/13/2014 Not reviewed this visit Vitals BP Pulse Resp Height(growth percentile) Weight(growth percentile) SpO2  
 135/86 (BP 1 Location: Right arm, BP Patient Position: Sitting) 75 19 5' 4\" (1.626 m) 128 lb 3.2 oz (58.2 kg) 98% BMI OB Status Smoking Status 22.01 kg/m2 Postmenopausal Former Smoker Vitals History BMI and BSA Data Body Mass Index Body Surface Area 22.01 kg/m 2 1.62 m 2 Preferred Pharmacy Pharmacy Name [address] [phone] Your Updated Medication List  
  
   
This list is accurate as of: 6/27/17 10:54 AM.  Always use your most recent med list.  
  
  
  
  
 ALPRAZolam 0.25 mg tablet Commonly known as:  Marcie Tabatha Take 1 Tab by mouth two (2) times daily as needed for Anxiety. Max Daily Amount: 0.5 mg.  
  
 aspirin 81 mg tablet Take 162 mg by mouth. atorvastatin 20 mg tablet Commonly known as:  LIPITOR Take 1 Tab by mouth nightly. escitalopram oxalate 10 mg tablet Commonly known as:  Renetta Garner Take 1 Tab by mouth daily. lisinopril 20 mg tablet Commonly known as:  PRINIVIL, ZESTRIL  
TAKE 1 TABLET DAILY TO PREVENT STROKE AND HEART PROBLEMS  
  
 multivitamin tablet Commonly known as:  ONE A DAY Take 1 Tab by mouth daily. PREMPRO 0.3-1.5 mg Tab Generic drug:  estrogen (conjugated)-medroxyPROGESTERone TAKE 1 TABLET DAILY Patient Instructions If you have any questions regarding [employer], you may call [employer] support at [phone]. Introducing [facility] SERVICES! Mason[employer] introduces [employer] patient portal. Now you can access parts of your medical record, email your doctor's office, and request medication refills online. 1. In your internet browser, go to [email or web address] 2. Click on the First Time User? Click Here link in the Sign In box. You will see the New Member Sign Up page. 3. Enter your [employer] Access Code exactly as it appears below. You will not need to use this code after youve completed the sign-up process. If you do not sign up before the expiration date, you must request a new code. · [employer] Access Code: [email or web address] Expires: 7/25/2017 10:12 AM 
 
4. Enter the last four digits of your Social Security Number (xxxx) and Date of Birth (mm/dd/yyyy) as indicated and click Submit. You will be taken to the next sign-up page. 5. Create a [employer] ID. This will be your [employer] login ID and cannot be changed, so think of one that is secure and easy to remember. 6. Create a [employer] password. You can change your password at any time. 7. Enter your Password Reset Question and Answer. This can be used at a later time if you forget your password. 8. Enter your e-mail address. You will receive e-mail notification when new information is available in [address] 9. Click Sign Up. You can now view and download portions of your medical record.  
10. Click the Download Summary menu link to download a portable copy of your medical information. If you have questions, please visit the Frequently Asked Questions section of the [employer] website. Remember, [employer] is NOT to be used for urgent needs. For medical emergencies, dial 911. Now available from your iPhone and Android! Please provide this summary of care documentation to your next provider. Your primary care clinician is listed as Rubin Humphrey. If you have any questions after today's visit, please call [phone].

## 2017-06-27 NOTE — PATIENT INSTRUCTIONS
If you have any questions regarding [employer], you may call [employer] support at [phone].

## 2017-06-28 RX ORDER — ESCITALOPRAM OXALATE 10 MG/1
10 TABLET ORAL DAILY
Qty: 30 TAB | Refills: 5 | Status: SHIPPED | OUTPATIENT
Start: 2017-06-28 | End: 2017-09-06 | Stop reason: SDUPTHER

## 2017-06-28 RX ORDER — ATORVASTATIN CALCIUM 20 MG/1
20 TABLET, FILM COATED ORAL
Qty: 90 TAB | Refills: 3 | Status: SHIPPED | OUTPATIENT
Start: 2017-06-28 | End: 2017-11-13 | Stop reason: SDUPTHER

## 2017-07-11 DIAGNOSIS — R20.0 NUMBNESS: Primary | ICD-10-CM

## 2017-07-11 DIAGNOSIS — R53.1 LEFT-SIDED WEAKNESS: ICD-10-CM

## 2017-07-11 DIAGNOSIS — Z86.73 HISTORY OF CVA (CEREBROVASCULAR ACCIDENT): ICD-10-CM

## 2017-07-11 NOTE — PROGRESS NOTES
7/11/2017    Pt has been seen for past month for left sided parathesia and weakness. A MRI was recommended, pt declined to have MRI because she was involved with her 's care. Her  has terminal brain cancer. Son  Ashlyn today. Mr. Kiley Hancock is in [facility] again. His mother wants to get MRI.    Requests requisition for MRI of brain w/o contrast, fax to [facility].

## 2017-07-17 ENCOUNTER — OFFICE VISIT (OUTPATIENT)
Dept: FAMILY MEDICINE CLINIC | Age: 82
End: 2017-07-17

## 2017-07-17 ENCOUNTER — TELEPHONE (OUTPATIENT)
Dept: FAMILY MEDICINE CLINIC | Age: 82
End: 2017-07-17

## 2017-07-17 VITALS
HEART RATE: 78 BPM | RESPIRATION RATE: 20 BRPM | WEIGHT: 125.4 LBS | DIASTOLIC BLOOD PRESSURE: 68 MMHG | SYSTOLIC BLOOD PRESSURE: 134 MMHG | BODY MASS INDEX: 21.52 KG/M2 | OXYGEN SATURATION: 97 %

## 2017-07-17 DIAGNOSIS — I63.9 CEREBROVASCULAR ACCIDENT (CVA), UNSPECIFIED MECHANISM (HCC): ICD-10-CM

## 2017-07-17 DIAGNOSIS — R20.2 NUMBNESS AND TINGLING: ICD-10-CM

## 2017-07-17 DIAGNOSIS — R20.0 NUMBNESS AND TINGLING: ICD-10-CM

## 2017-07-17 DIAGNOSIS — R53.1 WEAKNESS: ICD-10-CM

## 2017-07-17 DIAGNOSIS — R20.8 DECREASED SENSE OF VIBRATION: ICD-10-CM

## 2017-07-17 DIAGNOSIS — I10 ESSENTIAL HYPERTENSION: ICD-10-CM

## 2017-07-17 DIAGNOSIS — T14.8XXA MULTIPLE SKIN TEARS: Primary | ICD-10-CM

## 2017-07-17 DIAGNOSIS — R26.9 ABNORMAL TANDEM GAIT TEST: ICD-10-CM

## 2017-07-17 RX ORDER — MUPIROCIN 20 MG/G
OINTMENT TOPICAL DAILY
Qty: 22 G | Refills: 0 | Status: SHIPPED | OUTPATIENT
Start: 2017-07-17 | End: 2017-07-18 | Stop reason: SDUPTHER

## 2017-07-17 NOTE — MR AVS SNAPSHOT
Visit Information Date & Time Provider Department Dept. Phone Encounter #  
 7/17/2017  2:30 PM HEIDE Eli 38 [phone] [id number] Your Appointments 7/25/2017 10:30 AM  
ESTABLISHED PATIENT with HEIDE Eli [facility] ([address]) Appt Note: 4 wk f/u  
 [address] [address] [phone]  
  
   
 [address] [place] [address],[address] [address]  
  
    
 7/26/2017  2:00 PM  
New Patient with Rosaura Sharma MD  
[facility] [address] [facility]) Appt Note: NP, [place]umb[place],  [id number] Dr. Nenita Ayoub, 
[id number], [address] [address]  
[address] [facility], [address] [place] [address], [address] [place] [address] [address] [phone] Upcoming Health Maintenance Date Due INFLUENZA AGE 9 TO ADULT 8/1/2017 BREAST CANCER SCRN MAMMOGRAM 4/27/2018 Pneumococcal 65+ Low/Medium Risk (2 of 2 - PPSV23) 6/13/2018 MEDICARE YEARLY EXAM 6/14/2018 GLAUCOMA SCREENING Q2Y 6/13/2019 DTaP/Tdap/Td series (2 - Td) 6/13/2027 Allergies as of 7/17/2017  Review Complete On: 7/17/2017 By: Sofia Vang NP Severity Noted Reaction Type Reactions Statins-hmg-coa Reductase Inhibitors  09/04/2013    Other (comments) Severs leg pain(Zetia, Crestor) Current Immunizations  Reviewed on 6/13/2017 Name Date Influenza High Dose Vaccine PF 10/5/2016 Influenza Vaccine 10/13/2014 Not reviewed this visit You Were Diagnosed With   
  
 Codes Comments Multiple skin tears    -  Primary ICD-10-CM: T14.8 ICD-9-CM: 879.8 Essential hypertension     ICD-10-CM: I10 
ICD-9-CM: 401.9 Cerebrovascular accident (CVA), unspecified mechanism ([facility] [address]     ICD-10-CM: I63.9 ICD-9-CM: 434.91 Numbness and tingling     ICD-10-CM: R20.0, R20.2 ICD-9-CM: 782.0 Abnormal tandem gait test     ICD-10-CM: R26.9 ICD-9-CM: 781.2 Decreased sense of vibration     ICD-10-CM: R20.8 ICD-9-CM: 782.0 Weakness     ICD-10-CM: R53.1 ICD-9-CM: 780.79 Vitals BP Pulse Resp Weight(growth percentile) SpO2 BMI  
 134/68 (BP 1 Location: Left arm, BP Patient Position: Sitting) 78 20 125 lb 6.4 oz (56.9 kg) 97% 21.52 kg/m2 OB Status Smoking Status Postmenopausal Former Smoker BMI and BSA Data Body Mass Index Body Surface Area  
 21.52 kg/m 2 1.6 m 2 Preferred Pharmacy Pharmacy Name Phone [address], [facility] [phone] Your Updated Medication List  
  
   
This list is accurate as of: 7/17/17  3:52 PM.  Always use your most recent med list.  
  
  
  
  
 ALPRAZolam 0.25 mg tablet Commonly known as:  Randolph Sathya Take 1 Tab by mouth two (2) times daily as needed for Anxiety. Max Daily Amount: 0.5 mg.  
  
 aspirin 81 mg tablet Take 81 mg by mouth. atorvastatin 20 mg tablet Commonly known as:  LIPITOR Take 1 Tab by mouth nightly. escitalopram oxalate 10 mg tablet Commonly known as:  Fredick Herring Take 1 Tab by mouth daily. lisinopril 20 mg tablet Commonly known as:  PRINIVIL, ZESTRIL  
TAKE 1 TABLET DAILY TO PREVENT STROKE AND HEART PROBLEMS  
  
 multivitamin tablet Commonly known as:  ONE A DAY Take 1 Tab by mouth daily. mupirocin 2 % ointment Commonly known as:  [facility] Apply  to affected area daily. Prescriptions Sent to Pharmacy Refills  
 mupirocin (BACTROBAN) 2 % ointment 0 Sig: Apply  to affected area daily. Class: Normal  
 Pharmacy: [address], [address] Ph #: [phone] Route: Topical  
  
We Performed the Following CBC WITH AUTOMATED DIFF [88677 CPT(R)] LYME DISEASE(B.BURGDORFERI)PCR I4400052 CPT(R)] METABOLIC PANEL, COMPREHENSIVE [44210 CPT(R)] THYROID PANEL W/TSH [27721 CPT(R)] VITAMIN B12 [id number] CPT(R)] To-Do List   
 07/17/2017 Imaging:  MRI BRAIN W WO CONT Introducing [facility]! Bryon Carl introduces [employer] patient portal. Now you can access parts of your medical record, email your doctor's office, and request medication refills online. 1. In your internet browser, go to [email or web address][email or web address]/"[employer] 2. Click on the First Time User? Click Here link in the Sign In box. You will see the New Member Sign Up page. 3. Enter your [employer] Access Code exactly as it appears below. You will not need to use this code after youve completed the sign-up process. If you do not sign up before the expiration date, you must request a new code. · [employer] Access Code: [email or web address] Expires: 7/25/2017 10:12 AM 
 
4. Enter the last four digits of your Social Security Number (xxxx) and Date of Birth (mm/dd/yyyy) as indicated and click Submit. You will be taken to the next sign-up page. 5. Create a BigDeal ID. This will be your [employer]Deal login ID and cannot be changed, so think of one that is secure and easy to remember. 6. Create a [employer] password. You can change your password at any time. 7. Enter your Password Reset Question and Answer. This can be used at a later time if you forget your password. 8. Enter your e-mail address. You will receive e-mail notification when new information is available in [address] 9. Click Sign Up. You can now view and download portions of your medical record. 10. Click the Download Summary menu link to download a portable copy of your medical information. If you have questions, please visit the Frequently Asked Questions section of the [employer] website. Remember, BigDe[employer] is NOT to be used for urgent needs. For medical emergencies, dial 911. Now available from your iPhone and Android! Please provide this summary of care documentation to your next provider. Lyme Disease Testing Disclaimer: § 11.2-9358.3. (Expires July 1, 2018) Lyme disease testing information disclosure. A. Every licensee or his in-office designee who orders a laboratory test for the presence of Lyme disease shall provide to the patient or his legal representative the following written information: \"ACCORDING TO THE CENTERS FOR DISEASE CONTROL AND PREVENTION, AS OF 2011 LYME DISEASE IS THE SIXTH FASTEST GROWING DISEASE IN THE [place]. YOUR HEALTH CARE PROVIDER HAS ORDERED A LABORATORY TEST FOR THE PRESENCE OF LYME DISEASE FOR YOU. CURRENT LABORATORY TESTING FOR LYME DISEASE CAN BE PROBLEMATIC AND STANDARD LABORATORY TESTS OFTEN RESULT IN FALSE NEGATIVE AND FALSE POSITIVE RESULTS, AND IF DONE TOO EARLY, YOU MAY NOT HAVE PRODUCED ENOUGH ANTIBODIES TO BE CONSIDERED POSITIVE BECAUSE YOUR IMMUNE RESPONSE REQUIRES TIME TO DEVELOP ANTIBODIES. IF YOU ARE TESTED FOR LYME DISEASE, AND THE RESULTS ARE NEGATIVE, THIS DOES NOT NECESSARILY MEAN YOU DO NOT HAVE LYME DISEASE. IF YOU CONTINUE TO EXPERIENCE SYMPTOMS, YOU SHOULD CONTACT YOUR HEALTH CARE PROVIDER AND INQUIRE ABOUT THE APPROPRIATENESS OF RETESTING OR ADDITIONAL TREATMENT. \"  
B. Licensees shall be immune from civil liability for the provision of the written information required by this section absent gross negligence or willful misconduct. Your primary care clinician is listed as Cinthya Humphrey. If you have any questions after today's visit, please call [phone].

## 2017-07-17 NOTE — TELEPHONE ENCOUNTER
Spoke with patient,   last week, now needs to be seen for a numbness inher side, discussed with Jean-Pierre Caceres, scheduled

## 2017-07-18 ENCOUNTER — OFFICE VISIT (OUTPATIENT)
Dept: NEUROLOGY | Age: 82
End: 2017-07-18

## 2017-07-18 VITALS
DIASTOLIC BLOOD PRESSURE: 78 MMHG | WEIGHT: 125.4 LBS | HEIGHT: 64 IN | BODY MASS INDEX: 21.41 KG/M2 | OXYGEN SATURATION: 97 % | SYSTOLIC BLOOD PRESSURE: 128 MMHG | HEART RATE: 68 BPM

## 2017-07-18 DIAGNOSIS — I67.89 CEREBRAL MICROVASCULAR DISEASE: ICD-10-CM

## 2017-07-18 DIAGNOSIS — R79.9 ABNORMAL FINDING OF BLOOD CHEMISTRY: ICD-10-CM

## 2017-07-18 DIAGNOSIS — I63.89 OTHER CEREBRAL INFARCTION (HCC): ICD-10-CM

## 2017-07-18 DIAGNOSIS — I63.9 CEREBRAL INFARCTION, UNSPECIFIED MECHANISM (HCC): ICD-10-CM

## 2017-07-18 DIAGNOSIS — R20.0 LEFT SIDED NUMBNESS: Primary | ICD-10-CM

## 2017-07-18 DIAGNOSIS — I65.23 STENOSIS OF BOTH INTERNAL CAROTID ARTERIES: Primary | ICD-10-CM

## 2017-07-18 DIAGNOSIS — I10 ESSENTIAL HYPERTENSION: ICD-10-CM

## 2017-07-18 RX ORDER — MUPIROCIN 20 MG/G
OINTMENT TOPICAL DAILY
Qty: 22 G | Refills: 0 | Status: SHIPPED | OUTPATIENT
Start: 2017-07-18 | End: 2017-08-14 | Stop reason: SDUPTHER

## 2017-07-18 NOTE — PROGRESS NOTES
7/17/2017    Chief Complaint   Patient presents with    Numbness       HPI: Ms.Berit WENDI Westfall is a 80 y.o. female. [employer] [place], originally from [place]. Her  just passed away last week from metastatic melanoma. She is doing very well. She feels that everything went as well as possible, the children were there, he was comfortable. She presents today for f/u of left sided dysthesias. She has a \"numb\" feeling in her left qrm/ hand, and leg. This began 6/3/17. She was under a lot of stress from her 's illness. He had just had gamma knife and beginning chemotherapy. She declined having any imaging studies at that time as she was so involved with her  and wanted to defer until he was more stable. She was indeed very anxious and started on SSRI and PRN Alprazolam for situational anxiety. That did improve her anxiety however she had persistent dysthesias on the left side. Since her 's loss, she is now able to pursue evaluation for herself. Her son is an ED physician and recommended that she see neurology. She also had a fall. She was walking up the front steps to her home and fell. Multiple skin tears of the left pre-tibia. She has been putting antibiotic ointment on it. MRI 5/6/2011  Small left thalamus infarct. Extensive chronic ischemic disease, white matter and atrophy. Allergies   Allergen Reactions    Statins-Hmg-Coa Reductase Inhibitors Other (comments)     Severs leg pain(Zetia, Crestor)       Current Outpatient Prescriptions   Medication Sig Dispense Refill    mupirocin (BACTROBAN) 2 % ointment Apply  to affected area daily. 22 g 0    escitalopram oxalate (LEXAPRO) 10 mg tablet Take 1 Tab by mouth daily. 30 Tab 5    atorvastatin (LIPITOR) 20 mg tablet Take 1 Tab by mouth nightly.  90 Tab 3    lisinopril (PRINIVIL, ZESTRIL) 20 mg tablet TAKE 1 TABLET DAILY TO PREVENT STROKE AND HEART PROBLEMS 90 Tab 2    multivitamin (ONE A DAY) tablet Take 1 Tab by mouth daily.      aspirin 81 mg tablet Take 81 mg by mouth.  ALPRAZolam (XANAX) 0.25 mg tablet Take 1 Tab by mouth two (2) times daily as needed for Anxiety. Max Daily Amount: 0.5 mg. 60 Tab 0       Past Medical History:   Diagnosis Date    Hypercholesterolemia     Stroke [facility]) 2011       Lab Results   Component Value Date/Time    Glucose 84 06/03/2017 06:45 PM    LDL, calculated 107 04/26/2017 10:09 AM    Creatinine 0.91 06/03/2017 06:45 PM       ROS:  Constitutional: No fever, chills or weight loss  Respiratory: No cough, SOB   CV: No chest pain or Palpitations  GI: No nausea, vomiting or diarrhea. : No dysuria or hematuria. Neuro: No headaches, seizures, change in mental status. Physical Exam:   VS Visit Vitals    /68 (BP 1 Location: Left arm, BP Patient Position: Sitting)    Pulse 78    Resp 20    Wt 125 lb 6.4 oz (56.9 kg)    SpO2 97%    BMI 21.52 kg/m2      General Alert,oriented X 3. NAD. Eyes Conjunctiva and lids normal.    PERRLA, EOMI.   ENMT Mucous membranes moist.    Oropharynx: no erythema, no exudates, no lesions, normal tongue. NECK Thyroid: normal size, nontender. Trachea midline, neck symmetrical and without masses. Carotids 2+ with no bruits. No enlarged nodes. RESP Clear to auscultation and percussion. No rales, wheezes, rhonchi, or rubs. CV RRR, with no S3 or S4, no murmur, no rub. IVÁN Ambulates independently with steady gait. Rises from chair smoothly. Normal strength and tone, no atrophy. EXT No deformity. Extremities without edema. DP and PT 2+ bilaterally. SKIN Skin warm, normal turgor. NEURO Cranial nerves normal 2-12. Negative Rhomberg. Abnormal tandem gait. Left Lower Extremity:  Absent vibration and positional sensation in left foot. PSYCH Judgment and insight good. Fund of knowledge is normal.   Affect is alert and attentive. 1. Multiple skin tears  Cleanse with NS. Mupirocin ointment BID    2. Essential hypertension  Stable   Check labs. - CBC WITH AUTOMATED DIFF  - METABOLIC PANEL, COMPREHENSIVE  - VITAMIN B12  - THYROID PANEL W/TSH  - MRI BRAIN W WO CONT; Future    3. Cerebrovascular accident (CVA), unspecified mechanism ([address]  Check labs. Check MRI. Reviewed MRI of 5/2011 Previous infarct of left thalamus   - METABOLIC PANEL, COMPREHENSIVE  - VITAMIN B12  - THYROID PANEL W/TSH  - MRI BRAIN W WO CONT; Future  - REFERRAL TO NEUROLOGY    4. Numbness and tingling  Persistent parathesia X 6 wks    - METABOLIC PANEL, COMPREHENSIVE  - VITAMIN B12  - THYROID PANEL W/TSH  - LYME DISEASE(B.BURGDORFERI)PCR  - MRI BRAIN W WO CONT; Future  - REFERRAL TO NEUROLOGY    5. Abnormal tandem gait test  Check labs. Check MRI  Refer to neuro  - METABOLIC PANEL, COMPREHENSIVE  - VITAMIN B12  - LYME DISEASE(B.BURGDORFERI)PCR  - MRI BRAIN W WO CONT; Future  - REFERRAL TO NEUROLOGY    6. 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   - REFERRAL TO NEUROLOGY    7. Weakness   Check labs. - LYME DISEASE(B.BURGDORFERI)PCR        Orders Placed This Encounter    MRI BRAIN W WO CONT     Standing Status:   Future     Standing Expiration Date:   8/17/2018     Scheduling Instructions:      [facility]     Order Specific Question:   Is Patient Allergic to Contrast Dye? Answer:   No    CBC WITH AUTOMATED DIFF    METABOLIC PANEL, COMPREHENSIVE    VITAMIN B12    THYROID PANEL W/TSH   Tivis Abelson LYME DISEASE(B.BURGDORFERI)PCR     Order Specific Question:   Specimen type     Answer:   Serum [[id number]]    REFERRAL TO NEUROLOGY     Referral Priority:   Routine     Referral Type:   Consultation     Referral Reason:   Specialty Services Required     Requested Specialty:   Neurology    DISCONTD: mupirocin (BACTROBAN) 2 % ointment     Sig: Apply  to affected area daily. Dispense:  22 g     Refill:  0    mupirocin (BACTROBAN) 2 % ointment     Sig: Apply  to affected area daily.      Dispense:  22 g Refill:  0       Follow-up Disposition:  Return in about 2 weeks (around 7/31/2017).         KAM Keen

## 2017-07-18 NOTE — PROGRESS NOTES
Neurology Consultation Note  REFERRED BY:  Delores Sherwood MD    17    Chief Complaint   Patient presents with    New Patient     Left Sided 4-6 weeks       HISTORY OF PRESENT ILLNESS  Consuelo Bianchi is a 80 y.o. female who presented to the neurology office for management of left-sided numbness. The patient states that the symptoms started around 5 weeks ago. It was sudden in onset and it improved overnight and the next day she had recurrence of the symptoms and since that time it has been persistent. There has been no improvement in there are no modifying factors. There is no associated weakness. She does have history of stroke that happened in  and does have hypertension and hyperlipidemia. Current Outpatient Prescriptions   Medication Sig    mupirocin (BACTROBAN) 2 % ointment Apply  to affected area daily.  escitalopram oxalate (LEXAPRO) 10 mg tablet Take 1 Tab by mouth daily.  atorvastatin (LIPITOR) 20 mg tablet Take 1 Tab by mouth nightly.  lisinopril (PRINIVIL, ZESTRIL) 20 mg tablet TAKE 1 TABLET DAILY TO PREVENT STROKE AND HEART PROBLEMS    multivitamin (ONE A DAY) tablet Take 1 Tab by mouth daily.  aspirin 81 mg tablet Take 81 mg by mouth. No current facility-administered medications for this visit.       Allergies   Allergen Reactions    Statins-Hmg-Coa Reductase Inhibitors Other (comments)     Severs leg pain(Zetia, Crestor)     Past Medical History:   Diagnosis Date    Essential hypertension     Hypercholesterolemia     Stroke ([facility] [address]      Past Surgical History:   Procedure Laterality Date    HX COLONOSCOPY  2010    HX GYN       VI,Para V,SAB i     Family History   Problem Relation Age of Onset    No Known Problems Mother      Social History   Substance Use Topics    Smoking status: Former Smoker     Quit date: 1990    Smokeless tobacco: Never Used    Alcohol use Yes       REVIEW OF SYSTEMS:   A ten system review of constitutional, cardiovascular, respiratory, musculoskeletal, endocrine, skin, SHEENT, genitourinary, psychiatric and neurologic systems was obtained and is unremarkable with the exception of anxiety, falls, hearing loss, memory loss, muscle weakness     EXAMINATION:   Visit Vitals    /78    Pulse 68    Ht 5' 4\" (1.626 m)    Wt 125 lb 6.4 oz (56.9 kg)    SpO2 97%    BMI 21.52 kg/m2        General:   General appearance: Pt is in no acute distress   Distal pulses are preserved  Fundoscopic Exam: Normal    Neurological Examination:   Mental Status: AAO x3. Speech is fluent. Follows commands, has normal fund of knowledge, attention, short term recall, comprehension and insight. Cranial Nerves: Visual fields are full. PERRL, Extraocular movements are full. Facial sensation intact V1- V3. Facial movement intact, symmetric. Hearing intact to conversation. Palate elevates symmetrically. Shoulder shrug symmetric. Tongue midline. Motor: Strength is 5/5 in all 4 ext. No atrophy. Tone: Normal    Sensation: Decreased pinprick sensation in the left lower extremity    Reflexes: DTRs 2+ throughout. Coordination/Cerebellar: Intact to finger-nose-finger     Gait: Casual gait is normal.     Skin: No significant bruising or lacerations. Laboratory review:   Results for orders placed or performed during the hospital encounter of 06/03/17   CBC WITH AUTOMATED DIFF   Result Value Ref Range    WBC 5.8 3.6 - 11.0 K/uL    RBC 4.16 3.80 - 5.20 M/uL    HGB 12.7 11.5 - 16.0 g/dL    HCT 38.0 35.0 - 47.0 %    MCV 91.3 80.0 - 99.0 FL    MCH 30.5 26.0 - 34.0 PG    MCHC 33.4 30.0 - 36.5 g/dL    RDW 14.0 11.5 - 14.5 %    PLATELET 976 230 - 951 K/uL    NEUTROPHILS 47 32 - 75 %    LYMPHOCYTES 40 12 - 49 %    MONOCYTES 11 5 - 13 %    EOSINOPHILS 2 0 - 7 %    BASOPHILS 0 0 - 1 %    ABS. NEUTROPHILS 2.7 1.8 - 8.0 K/UL    ABS. LYMPHOCYTES 2.3 0.8 - 3.5 K/UL    ABS. MONOCYTES 0.7 0.0 - 1.0 K/UL    ABS. EOSINOPHILS 0.1 0.0 - 0.4 K/UL    ABS. BASOPHILS 0.0 0.0 - 0.1 K/UL   CK W/ CKMB & INDEX   Result Value Ref Range    CK 92 26 - 192 U/L    CK - MB 1.1 <3.6 NG/ML    CK-MB Index 1.2 0 - 2.5     METABOLIC PANEL, COMPREHENSIVE   Result Value Ref Range    Sodium 141 136 - 145 mmol/L    Potassium 3.6 3.5 - 5.1 mmol/L    Chloride 104 97 - 108 mmol/L    CO2 26 21 - 32 mmol/L    Anion gap 11 5 - 15 mmol/L    Glucose 84 65 - 100 mg/dL    BUN 20 (H) 7.0 - 18.0 MG/DL    Creatinine 0.91 0.55 - 1.02 MG/DL    BUN/Creatinine ratio 22 (H) 12 - 20      GFR est AA >60 >60 ml/min/1.73m2    GFR est non-AA 59 (L) >60 ml/min/1.73m2    Calcium 8.7 8.5 - 10.1 MG/DL    Bilirubin, total 0.4 0.2 - 1.0 MG/DL    ALT (SGPT) 46 14 - 63 U/L    AST (SGOT) 34 15 - 37 U/L    Alk.  phosphatase 47 45 - 117 U/L    Protein, total 7.5 6.4 - 8.2 g/dL    Albumin 4.0 3.5 - 5.0 g/dL    Globulin 3.5 2.0 - 4.0 g/dL    A-G Ratio 1.1 1.1 - 2.2     TROPONIN I   Result Value Ref Range    Troponin-I, Qt. <0.04 <0.08 ng/mL   MAGNESIUM   Result Value Ref Range    Magnesium 2.2 1.6 - 2.4 mg/dL   URINALYSIS W/MICROSCOPIC   Result Value Ref Range    Color YELLOW/STRAW      Appearance CLEAR CLEAR      Specific gravity 1.020 1.003 - 1.030      pH (UA) 6.5 5.0 - 8.0      Protein NEGATIVE  NEG mg/dL    Glucose NEGATIVE  NEG mg/dL    Ketone NEGATIVE  NEG mg/dL    Bilirubin NEGATIVE  NEG      Blood TRACE (A) NEG      Urobilinogen 0.2 0.2 - 1.0 EU/dL    Nitrites NEGATIVE  NEG      Leukocyte Esterase SMALL (A) NEG      WBC 10-20 0 - 4 /hpf    RBC 0-5 0 - 5 /hpf    Epithelial cells FEW FEW /lpf    Bacteria 1+ (A) NEG /hpf   EKG, 12 LEAD, INITIAL   Result Value Ref Range    Ventricular Rate 68 BPM    Atrial Rate 68 BPM    P-R Interval 178 ms    QRS Duration 92 ms    Q-T Interval 420 ms    QTC Calculation (Bezet) 446 ms    Calculated P Axis 47 degrees    Calculated R Axis -11 degrees    Calculated T Axis 29 degrees    Diagnosis       Normal sinus rhythm  Minimal voltage criteria for LVH, may be normal variant  Borderline ECG  No previous ECGs available  Confirmed by Diana Santamaria MD, --- ([id number]) on 6/4/2017 6:43:50 AM         Imaging review:  6/3/2017  CT scan of the head without contrast  Periventricular hypoattenuation suggesting microvascular ischemic change  I reviewed the images    Documentation review:  I reviewed the notes from the primary care physician from 7/17/2017. The patient's  just passed away last week from metastatic melanoma. She is doing very well. She presents today for follow-up of left-sided dysesthesia. She has a numb feeling on the left arm, hand and leg. This began on 6/3/2017. She declined having any imaging studies at this time as she was so involved that has been and wanted to defer that. She had MRI in 2011 which showed a small left thalamic infarct. We will refer to neurology for further management. Assessment/Plan:   Arnulfo Kang is a 80 y.o. female who presented to the neurology office for management of new onset left-sided numbness. It involves the face and the body as well. It has been going on for 5 weeks with no worsening. She is also under a lot of stress at this time. Considering right thalamic infarction versus stress reaction. We will get MRI of the brain without contrast.  Also have ordered carotid ultrasound and a transthoracic echo. We also going to get blood work including HbA1c and lipid panel. In the meanwhile, will continue aspirin 81 mg a day and present dose of statin. Follow-up in 3 months      ICD-10-CM ICD-9-CM    1. Left sided numbness R20.0 782.0 LIPID PANEL      HEMOGLOBIN A1C W/O EAG      2D ECHO COMPLETE ADULT (TTE) W OR WO CONTR      DUPLEX CAROTID BILATERAL AMB NEURO      MRI BRAIN WO CONT      DUPLEX CAROTID BILATERAL AMB NEURO   2. Essential hypertension I10 401.9    3. Cerebral infarction, unspecified mechanism ([address]  I63.9 434.91 LIPID PANEL   4. Abnormal finding of blood chemistry  R79.9 790.6 HEMOGLOBIN A1C W/O EAG   5.  Other cerebral infarction ([facility]  I63.8 434.91 2D ECHO COMPLETE ADULT (TTE) W OR WO CONTR      Thank you for allowing me to participate in the care of Ms. Atiya Proctor. Please feel free to contact me if you have any questions. Jacklynn Sicard, MD  Neurologist    CC: Terri Al MD  Fax: [phone]    This note was created using voice recognition software. Despite editing, there may be syntax errors. This note will not be viewable in [address]

## 2017-07-18 NOTE — LETTER
2017 3:15 PM 
 
Patient:    Zach Gutierres YOB: 1932 Date of Visit:    2017 Dear Aby Coffey MD 
 
Thank you for referring Ms. Evelene Siemens to me for evaluation/treatment. Below are the relevant portions of my assessment and plan of care. Neurology Consultation Note REFERRED BY: 
Aby Coffey MD 
 
17 Chief Complaint Patient presents with  New Patient Left Sided 4-6 weeks HISTORY OF PRESENT ILLNESS Zach Gutierres is a 80 y.o. female who presented to the neurology office for management of left-sided numbness. The patient states that the symptoms started around 5 weeks ago. It was sudden in onset and it improved overnight and the next day she had recurrence of the symptoms and since that time it has been persistent. There has been no improvement in there are no modifying factors. There is no associated weakness. She does have history of stroke that happened in  and does have hypertension and hyperlipidemia. Current Outpatient Prescriptions Medication Sig  mupirocin (BACTROBAN) 2 % ointment Apply  to affected area daily.  escitalopram oxalate (LEXAPRO) 10 mg tablet Take 1 Tab by mouth daily.  atorvastatin (LIPITOR) 20 mg tablet Take 1 Tab by mouth nightly.  lisinopril (PRINIVIL, ZESTRIL) 20 mg tablet TAKE 1 TABLET DAILY TO PREVENT STROKE AND HEART PROBLEMS  multivitamin (ONE A DAY) tablet Take 1 Tab by mouth daily.  aspirin 81 mg tablet Take 81 mg by mouth. No current facility-administered medications for this visit. Allergies Allergen Reactions  Statins-Hmg-Coa Reductase Inhibitors Other (comments) Severs leg pain(Zetia, Crestor) Past Medical History:  
Diagnosis Date  Essential hypertension  Hypercholesterolemia  Stroke [facility])  Past Surgical History:  
Procedure Laterality Date  HX COLONOSCOPY    HX GYN    
  VI,Para V,SAB i Family History Problem Relation Age of Onset  No Known Problems Mother Social History Substance Use Topics  Smoking status: Former Smoker Quit date: 9/4/1990  Smokeless tobacco: Never Used  Alcohol use Yes REVIEW OF SYSTEMS:  
A ten system review of constitutional, cardiovascular, respiratory, musculoskeletal, endocrine, skin, SHEENT, genitourinary, psychiatric and neurologic systems was obtained and is unremarkable with the exception of anxiety, falls, hearing loss, memory loss, muscle weakness EXAMINATION:  
Visit Vitals  /78  Pulse 68  Ht 5' 4\" (1.626 m)  Wt 125 lb 6.4 oz (56.9 kg)  SpO2 97%  BMI 21.52 kg/m2 General:  
General appearance: Pt is in no acute distress Distal pulses are preserved Fundoscopic Exam: Normal 
 
Neurological Examination:  
Mental Status: AAO x3. Speech is fluent. Follows commands, has normal fund of knowledge, attention, short term recall, comprehension and insight. Cranial Nerves: Visual fields are full. PERRL, Extraocular movements are full. Facial sensation intact V1- V3. Facial movement intact, symmetric. Hearing intact to conversation. Palate elevates symmetrically. Shoulder shrug symmetric. Tongue midline. Motor: Strength is 5/5 in all 4 ext. No atrophy. Tone: Normal 
 
Sensation: Decreased pinprick sensation in the left lower extremity Reflexes: DTRs 2+ throughout. Coordination/Cerebellar: Intact to finger-nose-finger Gait: Casual gait is normal.  
 
Skin: No significant bruising or lacerations. Laboratory review:  
Results for orders placed or performed during the hospital encounter of 06/03/17 CBC WITH AUTOMATED DIFF Result Value Ref Range WBC 5.8 3.6 - 11.0 K/uL  
 RBC 4.16 3.80 - 5.20 M/uL  
 HGB 12.7 11.5 - 16.0 g/dL HCT 38.0 35.0 - 47.0 % MCV 91.3 80.0 - 99.0 FL  
 MCH 30.5 26.0 - 34.0 PG  
 MCHC 33.4 30.0 - 36.5 g/dL  
 RDW 14.0 11.5 - 14.5 % PLATELET 515 653 - 050 K/uL NEUTROPHILS 47 32 - 75 % LYMPHOCYTES 40 12 - 49 % MONOCYTES 11 5 - 13 % EOSINOPHILS 2 0 - 7 % BASOPHILS 0 0 - 1 %  
 ABS. NEUTROPHILS 2.7 1.8 - 8.0 K/UL  
 ABS. LYMPHOCYTES 2.3 0.8 - 3.5 K/UL  
 ABS. MONOCYTES 0.7 0.0 - 1.0 K/UL  
 ABS. EOSINOPHILS 0.1 0.0 - 0.4 K/UL  
 ABS. BASOPHILS 0.0 0.0 - 0.1 K/UL  
CK W/ CKMB & INDEX Result Value Ref Range CK 92 26 - 192 U/L  
 CK - MB 1.1 <3.6 NG/ML  
 CK-MB Index 1.2 0 - 2.5 METABOLIC PANEL, COMPREHENSIVE Result Value Ref Range Sodium 141 136 - 145 mmol/L Potassium 3.6 3.5 - 5.1 mmol/L Chloride 104 97 - 108 mmol/L  
 CO2 26 21 - 32 mmol/L Anion gap 11 5 - 15 mmol/L Glucose 84 65 - 100 mg/dL BUN 20 (H) 7.0 - 18.0 MG/DL Creatinine 0.91 0.55 - 1.02 MG/DL  
 BUN/Creatinine ratio 22 (H) 12 - 20 GFR est AA >60 >60 ml/min/1.73m2 GFR est non-AA 59 (L) >60 ml/min/1.73m2 Calcium 8.7 8.5 - 10.1 MG/DL Bilirubin, total 0.4 0.2 - 1.0 MG/DL  
 ALT (SGPT) 46 14 - 63 U/L  
 AST (SGOT) 34 15 - 37 U/L Alk. phosphatase 47 45 - 117 U/L Protein, total 7.5 6.4 - 8.2 g/dL Albumin 4.0 3.5 - 5.0 g/dL Globulin 3.5 2.0 - 4.0 g/dL A-G Ratio 1.1 1.1 - 2.2    
TROPONIN I Result Value Ref Range Troponin-I, Qt. <0.04 <0.08 ng/mL MAGNESIUM Result Value Ref Range Magnesium 2.2 1.6 - 2.4 mg/dL URINALYSIS W/MICROSCOPIC Result Value Ref Range Color YELLOW/STRAW Appearance CLEAR CLEAR Specific gravity 1.020 1.003 - 1.030    
 pH (UA) 6.5 5.0 - 8.0 Protein NEGATIVE  NEG mg/dL Glucose NEGATIVE  NEG mg/dL Ketone NEGATIVE  NEG mg/dL Bilirubin NEGATIVE  NEG Blood TRACE (A) NEG Urobilinogen 0.2 0.2 - 1.0 EU/dL Nitrites NEGATIVE  NEG Leukocyte Esterase SMALL (A) NEG    
 WBC 10-20 0 - 4 /hpf  
 RBC 0-5 0 - 5 /hpf Epithelial cells FEW FEW /lpf Bacteria 1+ (A) NEG /hpf  
EKG, 12 LEAD, INITIAL Result Value Ref Range  Ventricular Rate 68 BPM  
 Atrial Rate 68 BPM  
 P-R Interval 178 ms QRS Duration 92 ms Q-T Interval 420 ms QTC Calculation (Bezet) 446 ms Calculated P Axis 47 degrees Calculated R Axis -11 degrees Calculated T Axis 29 degrees Diagnosis Normal sinus rhythm Minimal voltage criteria for LVH, may be normal variant Borderline ECG No previous ECGs available Confirmed by Dona Parsons MD, --- ([id number]) on 6/4/2017 6:43:50 AM 
  
 
 
Imaging review: 
6/3/2017 CT scan of the head without contrast 
Periventricular hypoattenuation suggesting microvascular ischemic change I reviewed the images Documentation review: I reviewed the notes from the primary care physician from 7/17/2017. The patient's  just passed away last week from metastatic melanoma. She is doing very well. She presents today for follow-up of left-sided dysesthesia. She has a numb feeling on the left arm, hand and leg. This began on 6/3/2017. She declined having any imaging studies at this time as she was so involved that has been and wanted to defer that. She had MRI in 2011 which showed a small left thalamic infarct. We will refer to neurology for further management. Assessment/Plan:  
Wil Crocker is a 80 y.o. female who presented to the neurology office for management of new onset left-sided numbness. It involves the face and the body as well. It has been going on for 5 weeks with no worsening. She is also under a lot of stress at this time. Considering right thalamic infarction versus stress reaction. We will get MRI of the brain without contrast.  Also have ordered carotid ultrasound and a transthoracic echo. We also going to get blood work including HbA1c and lipid panel. In the meanwhile, will continue aspirin 81 mg a day and present dose of statin. Follow-up in 3 months ICD-10-CM ICD-9-CM 1.  Left sided numbness R20.0 782.0 LIPID PANEL  
   HEMOGLOBIN A1C W/O EAG  
   2D ECHO COMPLETE ADULT (TTE) W OR WO CONTR  
 DUPLEX CAROTID BILATERAL AMB NEURO  
   MRI BRAIN WO CONT  
   DUPLEX CAROTID BILATERAL AMB NEURO 2. Essential hypertension I10 401.9 3. Cerebral infarction, unspecified mechanism ([address]  I63.9 434.91 LIPID PANEL 4. Abnormal finding of blood chemistry  R79.9 790.6 HEMOGLOBIN A1C W/O EAG  
5. Other cerebral infarction ([facility] [address]  I63.8 434.91 2D ECHO COMPLETE ADULT (TTE) W OR WO CONTR Thank you for allowing me to participate in the care of Ms. Guille Ku. Please feel free to contact me if you have any questions. Shweta Ba MD 
Neurologist 
 
CC: Chele Kiran MD 
Fax: [phone] This note was created using voice recognition software. Despite editing, there may be syntax errors. This note will not be viewable in [address] If you have questions, please do not hesitate to call me. I look forward to following Ms. Guille Ku along with you. Sincerely, Shweta Ba MD

## 2017-07-18 NOTE — MR AVS SNAPSHOT
Visit Information Date & Time Provider Department Dept. Phone Encounter #  
 7/18/2017  2:00 PM Calixto White MD Neurology [facility] at [facility] [phone] [id number] Your Appointments 7/25/2017 10:30 AM  
ESTABLISHED PATIENT with Jerry Raymondronn, [address] ([facility]) Appt Note: 4 wk f/u  
 [id number] [facility],5Th Floor [address] [phone]  
  
   
 [address] [address]  
  
    
 7/26/2017  2:00 PM  
New Patient with Adam Peguero MD  
Neurology Clinic at [facility]) Appt Note: NP, [place], [place] [id number] Dr. Ernestine Friedman, 
[id number], [address] [address]  
[address] [facility], [address] [place] [address] Avenue, [address] [address]  
  
    
 10/31/2017  2:40 PM  
Follow Up with Calixto White MD  
Neurology Clinic at [facility]) Appt Note: FU,CP, $0,adt,7/18/2017  
 [address] [place] [address], 
[address] [place] [address] Avenue, [address] [address]  
[address] [facility], [address] [place] [address], [address] [address] Upcoming Health Maintenance Date Due INFLUENZA AGE 9 TO ADULT 8/1/2017 BREAST CANCER SCRN MAMMOGRAM 4/27/2018 Pneumococcal 65+ Low/Medium Risk (2 of 2 - PPSV23) 6/13/2018 MEDICARE YEARLY EXAM 6/14/2018 GLAUCOMA SCREENING Q2Y 6/13/2019 DTaP/Tdap/Td series (2 - Td) 6/13/2027 Allergies as of 7/18/2017  Review Complete On: 7/18/2017 By: Calixto White MD  
  
 Severity Noted Reaction Type Reactions Statins-hmg-coa Reductase Inhibitors  09/04/2013    Other (comments) Severs leg pain(Zetia, Crestor) Current Immunizations  Reviewed on 6/13/2017 Name Date Influenza High Dose Vaccine PF 10/5/2016 Influenza Vaccine 10/13/2014 Not reviewed this visit You Were Diagnosed With   
  
 Codes Comments Left sided numbness    -  Primary ICD-10-CM: R20.0 ICD-9-CM: 782.0 Essential hypertension     ICD-10-CM: I10 
ICD-9-CM: 401.9 Cerebral infarction, unspecified mechanism ([facility] [address]     ICD-10-CM: I63.9 ICD-9-CM: 434.91 Abnormal finding of blood chemistry     ICD-10-CM: R79.9 ICD-9-CM: 790.6 Other cerebral infarction [facility])     ICD-10-CM: I63.8 ICD-9-CM: 434.91 Vitals BP Pulse Height(growth percentile) Weight(growth percentile) SpO2 BMI  
 128/78 68 5' 4\" (1.626 m) 125 lb 6.4 oz (56.9 kg) 97% 21.52 kg/m2 OB Status Smoking Status Postmenopausal Former Smoker BMI and BSA Data Body Mass Index Body Surface Area  
 21.52 kg/m 2 1.6 m 2 Preferred Pharmacy Pharmacy Name Phone [id number] Ayush, [address] [phone] Your Updated Medication List  
  
   
This list is accurate as of: 7/18/17  2:41 PM.  Always use your most recent med list.  
  
  
  
  
 aspirin 81 mg tablet Take 81 mg by mouth. atorvastatin 20 mg tablet Commonly known as:  LIPITOR Take 1 Tab by mouth nightly. escitalopram oxalate 10 mg tablet Commonly known as:  Fuller Estrin Take 1 Tab by mouth daily. lisinopril 20 mg tablet Commonly known as:  PRINIVIL, ZESTRIL  
TAKE 1 TABLET DAILY TO PREVENT STROKE AND HEART PROBLEMS  
  
 multivitamin tablet Commonly known as:  ONE A DAY Take 1 Tab by mouth daily. mupirocin 2 % ointment Commonly known as:  [facility] Apply  to affected area daily. We Performed the Following HEMOGLOBIN A1C W/O EAG [39880 CPT(R)] LIPID PANEL [15106 CPT(R)] To-Do List   
 07/18/2017 ECHO:  2D ECHO COMPLETE ADULT (TTE) W OR WO CONTR   
  
 07/18/2017 Imaging:  DUPLEX CAROTID BILATERAL AMB NEURO   
  
 07/18/2017 Imaging:  MRI BRAIN WO CONT Patient Instructions 1. MRI brain 2. Carotid ultrasound 3. Transthoracic echo 4. HbA1c and lipid panel 5. Follow-up 3 months A Healthy Lifestyle: Care Instructions Your Care Instructions A healthy lifestyle can help you feel good, stay at a healthy weight, and have plenty of energy for both work and play. A healthy lifestyle is something you can share with your whole family. A healthy lifestyle also can lower your risk for serious health problems, such as high blood pressure, heart disease, and diabetes. You can follow a few steps listed below to improve your health and the health of your family. Follow-up care is a key part of your treatment and safety. Be sure to make and go to all appointments, and call your doctor if you are having problems. Its also a good idea to know your test results and keep a list of the medicines you take. How can you care for yourself at home? · Do not eat too much sugar, fat, or fast foods. You can still have dessert and treats now and then. The goal is moderation. · Start small to improve your eating habits. Pay attention to portion sizes, drink less juice and soda pop, and eat more fruits and vegetables. ¨ Eat a healthy amount of food. A 3-ounce serving of meat, for example, is about the size of a deck of cards. Fill the rest of your plate with vegetables and whole grains. ¨ Limit the amount of soda and sports drinks you have every day. Drink more water when you are thirsty. ¨ Eat at least 5 servings of fruits and vegetables every day. It may seem like a lot, but it is not hard to reach this goal. A serving or helping is 1 piece of fruit, 1 cup of vegetables, or 2 cups of leafy, raw vegetables. Have an apple or some carrot sticks as an afternoon snack instead of a candy bar. Try to have fruits and/or vegetables at every meal. 
· Make exercise part of your daily routine. You may want to start with simple activities, such as walking, bicycling, or slow swimming. Try to be active 30 to 60 minutes every day.  You do not need to do all 30 to 60 minutes all at once. For example, you can exercise 3 times a day for 10 or 20 minutes. Moderate exercise is safe for most people, but it is always a good idea to talk to your doctor before starting an exercise program. 
· Keep moving. [employer] the lawn, work in the garden, or [employer]. Take the stairs instead of the elevator at work. · If you smoke, quit. People who smoke have an increased risk for heart attack, stroke, cancer, and other lung illnesses. Quitting is hard, but there are ways to boost your chance of quitting tobacco for good. ¨ Use nicotine gum, patches, or lozenges. ¨ Ask your doctor about stop-smoking programs and medicines. ¨ Keep trying. In addition to reducing your risk of diseases in the future, you will notice some benefits soon after you stop using tobacco. If you have shortness of breath or asthma symptoms, they will likely get better within a few weeks after you quit. · Limit how much alcohol you drink. Moderate amounts of alcohol (up to 2 drinks a day for men, 1 drink a day for women) are okay. But drinking too much can lead to liver problems, high blood pressure, and other health problems. Family health If you have a family, there are many things you can do together to improve your health. · Eat meals together as a family as often as possible. · Eat healthy foods. This includes fruits, vegetables, lean meats and dairy, and whole grains. · Include your family in your fitness plan. Most people think of activities such as jogging or tennis as the way to fitness, but there are many ways you and your family can be more active. Anything that makes you breathe hard and gets your heart pumping is exercise. Here are some tips: 
¨ Walk to do errands or to take your child to school or the bus. ¨ Go for a family bike ride after dinner instead of watching TV. Where can you learn more? Go to http://yasmani-carloz.info/. Enter W570 in the search box to learn more about \"A Healthy Lifestyle: Care Instructions. \" Current as of: July 26, 2016 Content Version: 11.3 © 0905-8498 [employer]. Care instructions adapted under license by [employer] (which disclaims liability or warranty for this information). If you have questions about a medical condition or this instruction, always ask your healthcare professional. [place] [address] any warranty or liability for your use of this information. Introducing [facility]! [employer] introduces [employer] patient portal. Now you can access parts of your medical record, email your doctor's office, and request medication refills online. 1. In your internet browser, go to [email or web address] 2. Click on the First Time User? Click Here link in the Sign In box. You will see the New Member Sign Up page. 3. Enter your [employer] Access Code exactly as it appears below. You will not need to use this code after youve completed the sign-up process. If you do not sign up before the expiration date, you must request a new code. · [employer] Access Code: [email or web address] Expires: 7/25/2017 10:12 AM 
 
4. Enter the last four digits of your Social Security Number (xxxx) and Date of Birth (mm/dd/yyyy) as indicated and click Submit. You will be taken to the next sign-up page. 5. Create a [employer] ID. This will be your [employer] login ID and cannot be changed, so think of one that is secure and easy to remember. 6. Create a [employer] password. You can change your password at any time. 7. Enter your Password Reset Question and Answer. This can be used at a later time if you forget your password. 8. Enter your e-mail address. You will receive e-mail notification when new information is available in [address] 9. Click Sign Up. You can now view and download portions of your medical record. 10. Click the Download Summary menu link to download a portable copy of your medical information. If you have questions, please visit the Frequently Asked Questions section of the [employer] website. Remember, [employer] is NOT to be used for urgent needs. For medical emergencies, dial 911. Now available from your iPhone and Android! Please provide this summary of care documentation to your next provider. Lyme Disease Testing Disclaimer:   
 § 66.8-4272.9. (Expires July 1, 2018) Lyme disease testing information disclosure. A. Every licensee or his in-office designee who orders a laboratory test for the presence of Lyme disease shall provide to the patient or his legal representative the following written information: \"ACCORDING TO THE CENTERS FOR DISEASE CONTROL AND PREVENTION, AS OF 2011 LYME DISEASE IS THE SIXTH FASTEST GROWING DISEASE IN THE [place]. YOUR HEALTH CARE PROVIDER HAS ORDERED A LABORATORY TEST FOR THE PRESENCE OF LYME DISEASE FOR YOU. CURRENT LABORATORY TESTING FOR LYME DISEASE CAN BE PROBLEMATIC AND STANDARD LABORATORY TESTS OFTEN RESULT IN FALSE NEGATIVE AND FALSE POSITIVE RESULTS, AND IF DONE TOO EARLY, YOU MAY NOT HAVE PRODUCED ENOUGH ANTIBODIES TO BE CONSIDERED POSITIVE BECAUSE YOUR IMMUNE RESPONSE REQUIRES TIME TO DEVELOP ANTIBODIES. IF YOU ARE TESTED FOR LYME DISEASE, AND THE RESULTS ARE NEGATIVE, THIS DOES NOT NECESSARILY MEAN YOU DO NOT HAVE LYME DISEASE. IF YOU CONTINUE TO EXPERIENCE SYMPTOMS, YOU SHOULD CONTACT YOUR HEALTH CARE PROVIDER AND INQUIRE ABOUT THE APPROPRIATENESS OF RETESTING OR ADDITIONAL TREATMENT. \"  
B. Licensees shall be immune from civil liability for the provision of the written information required by this section absent gross negligence or willful misconduct. Your primary care clinician is listed as Cinthya Humphrey. If you have any questions after today's visit, please call [phone].

## 2017-07-18 NOTE — PATIENT INSTRUCTIONS
1. MRI brain  2. Carotid ultrasound  3. Transthoracic echo  4. HbA1c and lipid panel  5. Follow-up 3 months      A Healthy Lifestyle: Care Instructions  Your Care Instructions  A healthy lifestyle can help you feel good, stay at a healthy weight, and have plenty of energy for both work and play. A healthy lifestyle is something you can share with your whole family. A healthy lifestyle also can lower your risk for serious health problems, such as high blood pressure, heart disease, and diabetes. You can follow a few steps listed below to improve your health and the health of your family. Follow-up care is a key part of your treatment and safety. Be sure to make and go to all appointments, and call your doctor if you are having problems. Its also a good idea to know your test results and keep a list of the medicines you take. How can you care for yourself at home? · Do not eat too much sugar, fat, or fast foods. You can still have dessert and treats now and then. The goal is moderation. · Start small to improve your eating habits. Pay attention to portion sizes, drink less juice and soda pop, and eat more fruits and vegetables. ¨ Eat a healthy amount of food. A 3-ounce serving of meat, for example, is about the size of a deck of cards. Fill the rest of your plate with vegetables and whole grains. ¨ Limit the amount of soda and sports drinks you have every day. Drink more water when you are thirsty. ¨ Eat at least 5 servings of fruits and vegetables every day. It may seem like a lot, but it is not hard to reach this goal. A serving or helping is 1 piece of fruit, 1 cup of vegetables, or 2 cups of leafy, raw vegetables. Have an apple or some carrot sticks as an afternoon snack instead of a candy bar. Try to have fruits and/or vegetables at every meal.  · Make exercise part of your daily routine. You may want to start with simple activities, such as walking, bicycling, or slow swimming.  Try to be active 30 to 60 minutes every day. You do not need to do all 30 to 60 minutes all at once. For example, you can exercise 3 times a day for 10 or 20 minutes. Moderate exercise is safe for most people, but it is always a good idea to talk to your doctor before starting an exercise program.  · Keep moving. Melany Wesco the lawn, work in the garden, or [employer]. Take the stairs instead of the elevator at work. · If you smoke, quit. People who smoke have an increased risk for heart attack, stroke, cancer, and other lung illnesses. Quitting is hard, but there are ways to boost your chance of quitting tobacco for good. ¨ Use nicotine gum, patches, or lozenges. ¨ Ask your doctor about stop-smoking programs and medicines. ¨ Keep trying. In addition to reducing your risk of diseases in the future, you will notice some benefits soon after you stop using tobacco. If you have shortness of breath or asthma symptoms, they will likely get better within a few weeks after you quit. · Limit how much alcohol you drink. Moderate amounts of alcohol (up to 2 drinks a day for men, 1 drink a day for women) are okay. But drinking too much can lead to liver problems, high blood pressure, and other health problems. Family health  If you have a family, there are many things you can do together to improve your health. · Eat meals together as a family as often as possible. · Eat healthy foods. This includes fruits, vegetables, lean meats and dairy, and whole grains. · Include your family in your fitness plan. Most people think of activities such as jogging or tennis as the way to fitness, but there are many ways you and your family can be more active. Anything that makes you breathe hard and gets your heart pumping is exercise. Here are some tips:  ¨ Walk to do errands or to take your child to school or the bus. ¨ Go for a family bike ride after dinner instead of watching TV. Where can you learn more?   Go to http://yasmani-carloz.info/. Enter Z943 in the search box to learn more about \"A Healthy Lifestyle: Care Instructions. \"  Current as of: July 26, 2016  Content Version: 11.3  © 4563-5628 [employer] Care instructions adapted under license by [employer] (which disclaims liability or warranty for this information). If you have questions about a medical condition or this instruction, always ask your healthcare professional. [address] any warranty or liability for your use of this information.

## 2017-07-19 PROBLEM — I65.23 STENOSIS OF BOTH INTERNAL CAROTID ARTERIES: Status: ACTIVE | Noted: 2017-07-19

## 2017-07-19 PROBLEM — I67.89 CEREBRAL MICROVASCULAR DISEASE: Status: ACTIVE | Noted: 2017-07-19

## 2017-07-19 LAB
ALBUMIN SERPL-MCNC: 4.3 G/DL (ref 3.5–4.7)
ALBUMIN/GLOB SERPL: 2 {RATIO} (ref 1.2–2.2)
ALP SERPL-CCNC: 61 IU/L (ref 39–117)
ALT SERPL-CCNC: 19 IU/L (ref 0–32)
AST SERPL-CCNC: 20 IU/L (ref 0–40)
B BURGDOR DNA SPEC QL NAA+PROBE: NEGATIVE
BASOPHILS # BLD AUTO: 0 X10E3/UL (ref 0–0.2)
BASOPHILS NFR BLD AUTO: 0 %
BILIRUB SERPL-MCNC: 0.2 MG/DL (ref 0–1.2)
BUN SERPL-MCNC: 13 MG/DL (ref 8–27)
BUN/CREAT SERPL: 16 (ref 12–28)
CALCIUM SERPL-MCNC: 9.4 MG/DL (ref 8.7–10.3)
CHLORIDE SERPL-SCNC: 100 MMOL/L (ref 96–106)
CO2 SERPL-SCNC: 24 MMOL/L (ref 18–29)
CREAT SERPL-MCNC: 0.81 MG/DL (ref 0.57–1)
EOSINOPHIL # BLD AUTO: 0.1 X10E3/UL (ref 0–0.4)
EOSINOPHIL NFR BLD AUTO: 1 %
ERYTHROCYTE [DISTWIDTH] IN BLOOD BY AUTOMATED COUNT: 14.2 % (ref 12.3–15.4)
FT4I SERPL CALC-MCNC: 2 (ref 1.2–4.9)
GLOBULIN SER CALC-MCNC: 2.2 G/DL (ref 1.5–4.5)
GLUCOSE SERPL-MCNC: 100 MG/DL (ref 65–99)
HCT VFR BLD AUTO: 39.2 % (ref 34–46.6)
HGB BLD-MCNC: 13 G/DL (ref 11.1–15.9)
IMM GRANULOCYTES # BLD: 0 X10E3/UL (ref 0–0.1)
IMM GRANULOCYTES NFR BLD: 0 %
LYMPHOCYTES # BLD AUTO: 1.8 X10E3/UL (ref 0.7–3.1)
LYMPHOCYTES NFR BLD AUTO: 28 %
MCH RBC QN AUTO: 30.7 PG (ref 26.6–33)
MCHC RBC AUTO-ENTMCNC: 33.2 G/DL (ref 31.5–35.7)
MCV RBC AUTO: 93 FL (ref 79–97)
MONOCYTES # BLD AUTO: 0.5 X10E3/UL (ref 0.1–0.9)
MONOCYTES NFR BLD AUTO: 8 %
NEUTROPHILS # BLD AUTO: 4 X10E3/UL (ref 1.4–7)
NEUTROPHILS NFR BLD AUTO: 63 %
PLATELET # BLD AUTO: 246 X10E3/UL (ref 150–379)
POTASSIUM SERPL-SCNC: 4 MMOL/L (ref 3.5–5.2)
PROT SERPL-MCNC: 6.5 G/DL (ref 6–8.5)
RBC # BLD AUTO: 4.24 X10E6/UL (ref 3.77–5.28)
SODIUM SERPL-SCNC: 140 MMOL/L (ref 134–144)
T3RU NFR SERPL: 28 % (ref 24–39)
T4 SERPL-MCNC: 7.2 UG/DL (ref 4.5–12)
TSH SERPL DL<=0.005 MIU/L-ACNC: 1.86 UIU/ML (ref 0.45–4.5)
VIT B12 SERPL-MCNC: 689 PG/ML (ref 211–946)
WBC # BLD AUTO: 6.4 X10E3/UL (ref 3.4–10.8)

## 2017-07-19 NOTE — PROCEDURES
This study consisted of pulsed wave Doppler examination, Color-flow imaging, and Duplex imaging of both the right and left carotid systems, and both vertebral arteries.        Imaging of both right and left carotid systems showed no significant plaquing at the bifurcations and proximal and distal internal and external carotid arteries bilaterally, with stenosis in the range of 0-15 % only and with no flow abnormalities identified.        Both vertebral arteries showed normal antegrade flow.         Clinical correlation recommended

## 2017-07-25 ENCOUNTER — DOCUMENTATION ONLY (OUTPATIENT)
Dept: FAMILY MEDICINE CLINIC | Age: 82
End: 2017-07-25

## 2017-07-25 NOTE — PROGRESS NOTES
7/25/2017    Patient called for MRI results:  Ordered by Dr. Meri Duque    1. 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 Currently on ASA 81mg  Her BP has been well controlled. Monotherapy with clopidogrel (Plavix) 75mg daily is preferred over ASA.        Lord Forrest DNP, FNP-BC

## 2017-08-01 ENCOUNTER — OFFICE VISIT (OUTPATIENT)
Dept: FAMILY MEDICINE CLINIC | Age: 82
End: 2017-08-01

## 2017-08-01 VITALS
BODY MASS INDEX: 21.1 KG/M2 | HEIGHT: 64 IN | RESPIRATION RATE: 17 BRPM | OXYGEN SATURATION: 97 % | WEIGHT: 123.6 LBS | HEART RATE: 74 BPM | SYSTOLIC BLOOD PRESSURE: 100 MMHG | DIASTOLIC BLOOD PRESSURE: 62 MMHG

## 2017-08-01 DIAGNOSIS — I63.9 CEREBROVASCULAR ACCIDENT (CVA), UNSPECIFIED MECHANISM (HCC): ICD-10-CM

## 2017-08-01 DIAGNOSIS — T14.8XXA MULTIPLE SKIN TEARS: Primary | ICD-10-CM

## 2017-08-01 DIAGNOSIS — R20.2 NUMBNESS AND TINGLING: ICD-10-CM

## 2017-08-01 DIAGNOSIS — R20.0 NUMBNESS AND TINGLING: ICD-10-CM

## 2017-08-01 DIAGNOSIS — I10 ESSENTIAL HYPERTENSION: ICD-10-CM

## 2017-08-01 DIAGNOSIS — F41.9 ANXIETY: ICD-10-CM

## 2017-08-01 NOTE — MR AVS SNAPSHOT
Visit Information Date & Time Provider Department Dept. Phone Encounter #  
 8/1/2017 10:30 AM Maritza Monique NP [address] [id number] Your Appointments 10/31/2017  2:40 PM  
Follow Up with Luly Slater MD  
[facility]) Appt Note: FU,CP, $0,adt,7/18/2017  
 [address] [facility], 
[address], [address] [facility] [place] [address]  
[address] , [address], [address] [facility] [place] [address] Upcoming Health Maintenance Date Due INFLUENZA AGE 9 TO ADULT 11/8/2017* BREAST CANCER SCRN MAMMOGRAM 4/27/2018 Pneumococcal 65+ Low/Medium Risk (2 of 2 - PPSV23) 6/13/2018 MEDICARE YEARLY EXAM 6/14/2018 GLAUCOMA SCREENING Q2Y 6/13/2019 DTaP/Tdap/Td series (2 - Td) 6/13/2027 *Topic was postponed. The date shown is not the original due date. Allergies as of 8/1/2017  Review Complete On: 8/1/2017 By: Glenna Elizondo Severity Noted Reaction Type Reactions Statins-hmg-coa Reductase Inhibitors  09/04/2013    Other (comments) Severs leg pain(Zetia, Crestor) Current Immunizations  Reviewed on 8/1/2017 Name Date Influenza High Dose Vaccine PF 10/5/2016 Influenza Vaccine 10/13/2014 Reviewed by Glenna Elizondo on 8/1/2017 at 10:35 AM  
Vitals BP Pulse Resp Height(growth percentile) Weight(growth percentile) SpO2  
 100/62 (BP 1 Location: Left arm, BP Patient Position: Sitting) 74 17 5' 4\" (1.626 m) 123 lb 9.6 oz (56.1 kg) 97% BMI OB Status Smoking Status 21.22 kg/m2 Postmenopausal Former Smoker Vitals History BMI and BSA Data Body Mass Index Body Surface Area  
 21.22 kg/m 2 1.59 m 2 Preferred Pharmacy Pharmacy Name Phone [id number], [address] [place] [phone] Your Updated Medication List  
  
   
 This list is accurate as of: 8/1/17 11:11 AM.  Always use your most recent med list.  
  
  
  
  
 aspirin 81 mg tablet Take 81 mg by mouth. atorvastatin 20 mg tablet Commonly known as:  LIPITOR Take 1 Tab by mouth nightly. escitalopram oxalate 10 mg tablet Commonly known as:  Alves Abts Take 1 Tab by mouth daily. lisinopril 20 mg tablet Commonly known as:  PRINIVIL, ZESTRIL  
TAKE 1 TABLET DAILY TO PREVENT STROKE AND HEART PROBLEMS  
  
 multivitamin tablet Commonly known as:  ONE A DAY Take 1 Tab by mouth daily. mupirocin 2 % ointment Commonly known as:  [facility] Apply  to affected area daily. Introducing [facility]! Justine Martinez introduces [employer] patient portal. Now you can access parts of your medical record, email your doctor's office, and request medication refills online. 1. In your internet browser, go to [email or web address] 2. Click on the First Time User? Click Here link in the Sign In box. You will see the New Member Sign Up page. 3. Enter your [employer] Access Code exactly as it appears below. You will not need to use this code after youve completed the sign-up process. If you do not sign up before the expiration date, you must request a new code. · [employer] Access Code: [email or web address] Expires: 10/24/2017  4:07 PM 
 
4. Enter the last four digits of your Social Security Number (xxxx) and Date of Birth (mm/dd/yyyy) as indicated and click Submit. You will be taken to the next sign-up page. 5. Create a [employer] ID. This will be your [employer] login ID and cannot be changed, so think of one that is secure and easy to remember. 6. Create a [employer] password. You can change your password at any time. 7. Enter your Password Reset Question and Answer. This can be used at a later time if you forget your password. 8. Enter your e-mail address.  You will receive e-mail notification when new information is available in [employer]. 9. Click Sign Up. You can now view and download portions of your medical record. 10. Click the Download Summary menu link to download a portable copy of your medical information. If you have questions, please visit the Frequently Asked Questions section of the [employer] website. Remember, [employer] is NOT to be used for urgent needs. For medical emergencies, dial 911. Now available from your iPhone and Android! Please provide this summary of care documentation to your next provider. Your primary care clinician is listed as Mandy Humphrey. If you have any questions after today's visit, please call [phone].

## 2017-08-01 NOTE — PROGRESS NOTES
8/16/2017    Chief Complaint   Patient presents with    Results     MRI results       HPI: Jatinder Lopez is a 80 y.o. female. [place], originally [place]. Her  recently passed away. She is doing very well. She feels that everything went as well as possible, the children were there, he was comfortable. She is grateful for having such a wonderful life. She presents today for f/u of left sided dysthesias. She has a \"numb\" feeling in her left qrm/ hand, and leg. This began 6/3/17, BP was ~200/100. She was under a lot of stress from her 's illness. He had just had gamma knife and beginning chemotherapy. She declined having any imaging studies at that time as she was so involved with her  and wanted to defer until he was more stable. She was indeed very anxious and started on SSRI. She has stopped the Alprazolam which she took PRN. Since her 's loss, she is now able to pursue evaluation for herself. Her son is an ED physician and recommended that she see neurology. She also had a fall. She was walking up the front steps to her home and fell. Multiple skin tears of the left pre-tibia. She has been putting antibiotic ointment on it. Is applying to [facility]. Allergies   Allergen Reactions    Statins-Hmg-Coa Reductase Inhibitors Other (comments)     Severs leg pain(Zetia, Crestor)       Current Outpatient Prescriptions   Medication Sig Dispense Refill    escitalopram oxalate (LEXAPRO) 10 mg tablet Take 1 Tab by mouth daily. 30 Tab 5    atorvastatin (LIPITOR) 20 mg tablet Take 1 Tab by mouth nightly. 90 Tab 3    lisinopril (PRINIVIL, ZESTRIL) 20 mg tablet TAKE 1 TABLET DAILY TO PREVENT STROKE AND HEART PROBLEMS 90 Tab 2    multivitamin (ONE A DAY) tablet Take 1 Tab by mouth daily.  aspirin 81 mg tablet Take 81 mg by mouth.  mupirocin (BACTROBAN) 2 % ointment Apply  to affected area daily.  22 g 0       Past Medical History:   Diagnosis Date    Essential hypertension     Hypercholesterolemia     [facility]roke [facility]) 2011       Lab Results   Component Value Date/Time    Glucose 100 07/17/2017 03:56 PM    LDL, calculated 107 04/26/2017 10:09 AM    Creatinine 0.81 07/17/2017 03:56 PM       ROS:  Constitutional: No fever, chills or weight loss  Respiratory: No cough, SOB   CV: No chest pain or Palpitations  GI: No nausea, vomiting or diarrhea. : No dysuria or hematuria. Neuro: No headaches, seizures, change in mental status. Physical Exam:   VS Visit Vitals    /62 (BP 1 Location: Left arm, BP Patient Position: Sitting)    Pulse 74    Resp 17    Ht 5' 4\" (1.626 m)    Wt 123 lb 9.6 oz (56.1 kg)    SpO2 97%    BMI 21.22 kg/m2      General Alert,oriented X 3. NAD. Ambulates independently with steady gait. Eyes Conjunctiva and lids normal.    PERRLA, EOMI.   ENMT Mucous membranes moist.    Oropharynx: no erythema, no exudates, no lesions, normal tongue. NECK Thyroid: normal size, nontender. Trachea midline, neck symmetrical and without masses. Carotids 2+ with no bruits. No enlarged nodes. RESP Clear to auscultation and percussion. No rales, wheezes, rhonchi, or rubs. CV RRR, with no S3 or S4, no murmur, no rub. GI   Normal bowel sounds, no bruit, soft, nontender, without masses. MSKEL Normal gait and station. Normal strength and tone, no atrophy. EXT DJD hands. Extremities without edema. DP and PT 2+ bilaterally. SKIN Skin warm, normal turgor. Left pre-tibia: multiple skin tears with local ,0.5 inch surrounding erythema. NEURO Cranial nerves normal 2-12. No abnormal movement   PSYCH Judgment and insight good. Fund of knowledge is normal.   Affect is alert and attentive. 1. Multiple skin tears  Sustained in fall. Mupirocin ointment BID     2. Essential hypertension  Stable well controlled  Continue current regimen   ACE with no adverse effects     3.  Cerebrovascular accident (CVA), unspecified mechanism ([facility] [address]  Small infarct Left basal ganglia. Residual sensory numbness of the left side     4. Numbness and tingling  Due to small CVA Left basal ganglia      5. Anxiety  Improved on Lexaprol. Reactive to her husbands serious illness and death. She is coping very well. Will consider taper discontinuation in ~ 3-6 mo. No orders of the defined types were placed in this encounter. Follow-up Disposition:  Return in about 3 months (around 11/1/2017).         KAM Seymour

## 2017-08-14 DIAGNOSIS — R20.8 DECREASED SENSE OF VIBRATION: ICD-10-CM

## 2017-08-14 RX ORDER — MUPIROCIN 20 MG/G
OINTMENT TOPICAL DAILY
Qty: 22 G | Refills: 0 | Status: SHIPPED | OUTPATIENT
Start: 2017-08-14 | End: 2018-01-22

## 2017-08-23 ENCOUNTER — OFFICE VISIT (OUTPATIENT)
Dept: FAMILY MEDICINE CLINIC | Age: 82
End: 2017-08-23

## 2017-08-23 VITALS
OXYGEN SATURATION: 96 % | SYSTOLIC BLOOD PRESSURE: 117 MMHG | HEART RATE: 73 BPM | WEIGHT: 124 LBS | TEMPERATURE: 99 F | HEIGHT: 64 IN | BODY MASS INDEX: 21.17 KG/M2 | DIASTOLIC BLOOD PRESSURE: 62 MMHG
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
## 2017-08-23 DIAGNOSIS — S62.619A: ICD-10-CM
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
## 2017-08-23 DIAGNOSIS — M79.641 HAND PAIN, RIGHT: Primary | ICD-10-CM

## 2017-08-23 DIAGNOSIS — I67.89 CEREBRAL MICROVASCULAR DISEASE: ICD-10-CM

## 2017-08-23 NOTE — PROGRESS NOTES
Carmen Faith is a 80 y.o. female presenting for/with:    Fall (right hand pain)      HPI:  Symptoms include R small finger pain x 3d. Karin Ortega after being pulled off of her feet by her dog, landed on her R outstretched arm. Wrist feels ok, but R small got bent in a funny angle and is pretty swollen, and there is a significant ecchymosis present. stable since that time. Evaluation to date: none. Treatment to date: rest, and ice. PMH, SH, Medications/Allergies: reviewed, on chart. ROS:  Constitutional: No fever, chills or weight loss  Respiratory: No cough, SOB   CV: No chest pain or Palpitations    Visit Vitals    /62    Pulse 73    Temp 99 °F (37.2 °C) (Temporal)    Ht 5' 4\" (1.626 m)    Wt 124 lb (56.2 kg)    SpO2 96%    BMI 21.28 kg/m2     Wt Readings from Last 3 Encounters:   08/23/17 124 lb (56.2 kg)   08/01/17 123 lb 9.6 oz (56.1 kg)   07/18/17 125 lb 6.4 oz (56.9 kg)     Physical Examination: General appearance - alert, well appearing, and in no distress  Mental status - alert, oriented to person, place, and time  Eyes - pupils equal and reactive, extraocular eye movements intact  ENT - bilateral external ears and nose normal. Normal lips  Extremities - peripheral pulses normal, no pedal edema, no clubbing. R small finger with moderate edema from MCP distally with moderate eccmymosis. Ring finger with moderate ecchymosis and edema. nttp to the metacarpals or carpals, and the Ring, middle, index and thumb are all nttp to the phalanges. The proximal phalanx of the small finger is quite ttp and has some crepitus without angulation. No cyanosis. Flexor and extensor cascade is intact. Healing laceration to the MCP dorsally of the middle finger    A/P:  Injury R hand/small finger. Con't monitor for healing of the laceration. Check xray R hand/finger for fx. Splint applied for comfort in safe position. If not fracture, use splint about a week, or PRN.  If fractured, but not displaced or angulated, plan routine ortho consult. If badly angulated or rotated (unlikely due to good looking exam), plan more acute ortho eval. F/U per xray. Microvascular ASCVD/CVD  Doing well on ASA and has good BP control. Doing well on lipitor 20, Plan recheck labs this fall.      F/U 3mo/PRN

## 2017-08-23 NOTE — MR AVS SNAPSHOT
Visit Information Date & Time Provider Department Dept. Phone Encounter #  
 8/23/2017  2:40 PM Magi Trejo MD [address] [place] [address] [id number] Follow-up Instructions Return in about 3 months (around 11/23/2017), or if symptoms worsen or fail to improve. Follow-up and Disposition History Your Appointments 8/29/2017  9:00 AM  
Follow Up with Wiley Severance, NP [address] ([facility] Appt Note: PER STEPHANIE/  
 [facility] [address] [facility],5[address] [address] [phone]  
  
   
 [address] [facility] [address] [place] [address],5[address] [address]  
  
    
 10/31/2017  2:40 PM  
Follow Up with Kristofer Tai MD  
Neurology [facility] at [facility]) Appt Note: DELMAR MCCURDY, $0,adt,7/18/2017  
 [address] [place] [address], 
[address] [place] [address], [address] [address]  
[address], [address] [place] [address], [address] [facility] [address] [address] Upcoming Health Maintenance Date Due INFLUENZA AGE 9 TO ADULT 11/8/2017* BREAST CANCER SCRN MAMMOGRAM 4/27/2018 Pneumococcal 65+ Low/Medium Risk (2 of 2 - PPSV23) 6/13/2018 MEDICARE YEARLY EXAM 6/14/2018 GLAUCOMA SCREENING Q2Y 6/13/2019 DTaP/Tdap/Td series (2 - Td) 6/13/2027 *Topic was postponed. The date shown is not the original due date. Allergies as of 8/23/2017  Review Complete On: 8/23/2017 By: Magi Trejo MD  
  
 Severity Noted Reaction Type Reactions Statins-hmg-coa Reductase Inhibitors  09/04/2013    Other (comments) Severs leg pain(Zetia, Crestor) Current Immunizations  Reviewed on 8/1/2017 Name Date Influenza High Dose Vaccine PF 10/5/2016 Influenza Vaccine 10/13/2014 Not reviewed this visit You Were Diagnosed With   
  
 Codes Comments Hand pain, right    -  Primary ICD-10-CM: M79.641 ICD-9-CM: 729.5 Fall at home, initial encounter     ICD-10-CM: W19. Daysi Castellon, H62.967 
ICD-9-CM: E888.9, [id number] Cerebral microvascular disease     ICD-10-CM: I67.9 ICD-9-CM: 437.9 Vitals BP Pulse Temp Height(growth percentile) Weight(growth percentile) SpO2  
 117/62 73 99 °F (37.2 °C) (Temporal) 5' 4\" (1.626 m) 124 lb (56.2 kg) 96% BMI OB Status Smoking Status 21.28 kg/m2 Postmenopausal Former Smoker BMI and BSA Data Body Mass Index Body Surface Area  
 21.28 kg/m 2 1.59 m 2 Preferred Pharmacy Pharmacy Name Phone [id number] [address] [facility] [phone] Your Updated Medication List  
  
   
This list is accurate as of: 8/23/17  4:20 PM.  Always use your most recent med list.  
  
  
  
  
 aspirin 81 mg tablet Take 81 mg by mouth. atorvastatin 20 mg tablet Commonly known as:  LIPITOR Take 1 Tab by mouth nightly. escitalopram oxalate 10 mg tablet Commonly known as:  Colen Brunner Take 1 Tab by mouth daily. lisinopril 20 mg tablet Commonly known as:  PRINIVIL, ZESTRIL  
TAKE 1 TABLET DAILY TO PREVENT STROKE AND HEART PROBLEMS  
  
 multivitamin tablet Commonly known as:  ONE A DAY Take 1 Tab by mouth daily. mupirocin 2 % ointment Commonly known as:  [facility] Apply  to affected area daily. Follow-up Instructions Return in about 3 months (around 11/23/2017), or if symptoms worsen or fail to improve. To-Do List   
 08/23/2017 Imaging:  XR HAND RT MIN 3 V Patient Instructions Hand Sprain: Care Instructions Your Care Instructions A hand sprain occurs when you stretch or tear a ligament in your hand. Ligaments are the tough tissues that connect one bone to another. Most hand sprains will heal with treatment you can do at home. Follow-up care is a key part of your treatment and safety. Be sure to make and go to all appointments, and call your doctor if you are having problems.  It's also a good idea to know your test results and keep a list of the medicines you take. How can you care for yourself at home? · If your doctor gave you a splint or immobilizer, wear it as directed. This will help keep swelling down and help your hand heal. 
· Follow your doctor's directions for exercise and other activity. · For the first 2 days after your injury, avoid things that might increase swelling, such as hot showers, hot tubs, or hot packs. · Put ice or a cold pack on your hand for 10 to 20 minutes at a time to stop swelling. Try this every 1 to 2 hours for 3 days (when you are awake) or until the swelling goes down. Put a thin cloth between the ice pack and your skin. Keep your splint dry. · After 2 or 3 days, if your swelling is gone, put a heating pad (set on low) or a warm cloth on your hand. Some experts suggest that you go back and forth between hot and cold treatments. · Prop up your hand on a pillow when you ice it or anytime you sit or lie down. Try to keep it above the level of your heart. This will help reduce swelling. · Take pain medicines exactly as directed. ¨ If the doctor gave you a prescription medicine for pain, take it as prescribed. ¨ If you are not taking a prescription pain medicine, ask your doctor if you can take an over-the-counter medicine. · Return to your usual level of activity slowly. When should you call for help? Call your doctor now or seek immediate medical care if: 
· Your pain is worse. · You have new or increased swelling in your hand. · You cannot move your hand. · You have tingling, weakness, or numbness in your hand or fingers. · Your hand or fingers are cool or pale or change color. · You have a fever. · Your hand or fingers are red. Watch closely for changes in your health, and be sure to contact your doctor if: 
· Your hand does not get better as expected. Where can you learn more? Go to [email or web address]-carloz.[email or web address]  
Enter U634 in the search box to learn more about \"Hand Sprain: Care Instructions. \" Current as of: March 21, 2017 Content Version: 11.3 © 1092-9956 [employer]. Care instructions adapted under license by [employer] (which disclaims liability or warranty for this information). If you have questions about a medical condition or this instruction, always ask your healthcare professional. [address] any warranty or liability for your use of this information. Introducing [facility] & HE[facility]! Guillermo Orona introduces [employer] patient portal. Now you can access parts of your medical record, email your doctor's office, and request medication refills online. 1. In your internet browser, go to [email or web address] 2. Click on the First Time User? Click Here link in the Sign In box. You will see the New Member Sign Up page. 3. Enter your [employer] Access Code exactly as it appears below. You will not need to use this code after youve completed the sign-up process. If you do not sign up before the expiration date, you must request a new code. · [employer] Access Code: [email or web address] Expires: 10/24/2017  4:07 PM 
 
4. Enter the last four digits of your Social Security Number (xxxx) and Date of Birth (mm/dd/yyyy) as indicated and click Submit. You will be taken to the next sign-up page. 5. Create a [employer] ID. This will be your [employer] login ID and cannot be changed, so think of one that is secure and easy to remember. 6. Create a [employer] password. You can change your password at any time. 7. Enter your Password Reset Question and Answer. This can be used at a later time if you forget your password. 8. Enter your e-mail address. You will receive e-mail notification when new information is available in [address] 9. Click Sign Up. You can now view and download portions of your medical record. 10. Click the Download Summary menu link to download a portable copy of your medical information. If you have questions, please visit the Frequently Asked Questions section of the SomethingIndiet website. Remember, BLOVES is NOT to be used for urgent needs. For medical emergencies, dial 911. Now available from your iPhone and Android! Please provide this summary of care documentation to your next provider. Your primary care clinician is listed as Tommy Humphrey. If you have any questions after today's visit, please call [phone].

## 2017-08-29 ENCOUNTER — OFFICE VISIT (OUTPATIENT)
Dept: FAMILY MEDICINE CLINIC | Age: 82
End: 2017-08-29

## 2017-08-29 VITALS
HEART RATE: 77 BPM | RESPIRATION RATE: 19 BRPM | TEMPERATURE: 97.5 F | OXYGEN SATURATION: 98 % | DIASTOLIC BLOOD PRESSURE: 82 MMHG | SYSTOLIC BLOOD PRESSURE: 132 MMHG | WEIGHT: 123 LBS | BODY MASS INDEX: 21.11 KG/M2

## 2017-08-29 DIAGNOSIS — I10 ESSENTIAL HYPERTENSION: Primary | ICD-10-CM

## 2017-08-29 DIAGNOSIS — S62.619A: ICD-10-CM

## 2017-08-29 RX ORDER — ALPRAZOLAM 0.25 MG/1
TABLET ORAL
COMMUNITY
Start: 2017-06-13 | End: 2017-11-13 | Stop reason: ALTCHOICE

## 2017-08-29 NOTE — PROGRESS NOTES
9/6/2017    Chief Complaint   Patient presents with    Finger Swelling     broken pinky finger on right hand. HPI: Samuel Greer is a 80 y.o. female. [place], originally from [place]. Her  recently passed away. She is doing very well. She feels that everything went as well as possible, the children were there, he was comfortable. She is grateful for having such a wonderful life. Resilient and amazing woman. She presents today for f/u of left sided dysthesias. She has a \"numb\" feeling in her left qrm/ hand, and leg. This began 6/3/17, BP was ~200/100. She was under a lot of stress from her 's illness. She has been on Escitalopram for anxiety depression and is feeling well. She has discontinued the Alprazolam.     MRI demonstrated a small infarct of the left basal ganglia. She has persistent numbness and dysthesia of the left side upper and lower extremity. No significant motor defects. She had a fall with pre-tibial abtrasions using Mupirocin ointment, resolving. Her Right 5th finger is swollen and tender. Seen by Dr. Moy Hassan ~ 5 days ago after a fall walking dog. Has a fracture of the 5th proximal digit. She has no pain and full ROM. Good . She does not want any further treatment. She is planning to move to [facility] and needs admission application and medical form completed. She continues to have her daughter and her granddaughter staying with her. Allergies   Allergen Reactions    Statins-Hmg-Coa Reductase Inhibitors Other (comments)     Other reaction(s): Other (comments)  Severs leg pain(Zetia, Crestor)  Severs leg pain(Zetia, Crestor)       Current Outpatient Prescriptions   Medication Sig Dispense Refill    ALPRAZolam (XANAX) 0.25 mg tablet TAKE 1 TABLET BY MOUTH TWICE A DAY AS NEEDED FOR ANXIETY (MAX 2 A DAY)      mupirocin (BACTROBAN) 2 % ointment Apply  to affected area daily.  22 g 0    escitalopram oxalate (LEXAPRO) 10 mg tablet Take 1 Tab by mouth daily. 30 Tab 5    atorvastatin (LIPITOR) 20 mg tablet Take 1 Tab by mouth nightly. 90 Tab 3    lisinopril (PRINIVIL, ZESTRIL) 20 mg tablet TAKE 1 TABLET DAILY TO PREVENT STROKE AND HEART PROBLEMS 90 Tab 2    multivitamin (ONE A DAY) tablet Take 1 Tab by mouth daily.  aspirin 81 mg tablet Take 81 mg by mouth. Past Medical History:   Diagnosis Date    Essential hypertension     Hypercholesterolemia     Stroke [facility]) 2011       Lab Results   Component Value Date/Time    Glucose 100 07/17/2017 03:56 PM    LDL, calculated 107 04/26/2017 10:09 AM    Creatinine 0.81 07/17/2017 03:56 PM       ROS:  Constitutional: No fever, chills or weight loss  Respiratory: No cough, SOB   CV: No chest pain or Palpitations  GI: No nausea, vomiting or diarrhea. : No dysuria or hematuria. Neuro: No headaches, seizures, change in mental status. Physical Exam:   VS Visit Vitals    /82 (BP 1 Location: Right arm, BP Patient Position: Sitting)    Pulse 77    Temp 97.5 °F (36.4 °C) (Oral)    Resp 19    Wt 123 lb (55.8 kg)    SpO2 98%    BMI 21.11 kg/m2      General Alert,oriented X 3. NAD. Ambulates independently with steady gait. Eyes Conjunctiva and lids normal.    PERRLA, EOMI.   ENMT Mucous membranes moist.    Oropharynx: no erythema, no exudates, no lesions, normal tongue. NECK Thyroid: normal size, nontender. Trachea midline, neck symmetrical and without masses. Carotids 2+ with no bruits. No enlarged nodes. RESP Clear to auscultation and percussion. No rales, wheezes, rhonchi, or rubs. CV RRR, with no S3 or S4, no murmur, no rub. GI   Normal bowel sounds, no bruit, soft, nontender, without masses. MSKEL Normal gait and station. Normal strength and tone, no atrophy. EXT 5th finger has minimal swelling and resolving ecchymosis. Non-tender with full ROM  5+ equal.      SKIN Skin warm, normal turgor. NEURO Cranial nerves normal 2-12.        PSYCH Judgment and insight good. Fund of knowledge is normal.   Affect is alert and attentive. 1. Essential hypertension  Well controlled. 2. Fracture of proximal phalanx of digit of right hand  Watchful waiting. Orders Placed This Encounter    ALPRAZolam (XANAX) 0.25 mg tablet     Sig: TAKE 1 TABLET BY MOUTH TWICE A DAY AS NEEDED FOR ANXIETY (MAX 2 A DAY)       Follow-up Disposition:  Return if symptoms worsen or fail to improve.         Dea Poag, ARNP

## 2017-08-29 NOTE — MR AVS SNAPSHOT
Visit Information Date & Time Provider Department Dept. Phone Encounter #  
 8/29/2017  9:00 AM Concepción Marshall NP [facility] [id number] Your Appointments 10/31/2017  2:40 PM  
Follow Up with Payal Neely MD  
Neurology Clinic at [facility]) Appt Note: FU,CP, $0,adt,7/18/2017  
 [address] [place] [address], 
[address] [place] [address], [address] [address]  
[address], [address], [address] [address] Upcoming Health Maintenance Date Due INFLUENZA AGE 9 TO ADULT 11/8/2017* BREAST CANCER SCRN MAMMOGRAM 4/27/2018 Pneumococcal 65+ Low/Medium Risk (2 of 2 - PPSV23) 6/13/2018 MEDICARE YEARLY EXAM 6/14/2018 GLAUCOMA SCREENING Q2Y 6/13/2019 DTaP/Tdap/Td series (2 - Td) 6/13/2027 *Topic was postponed. The date shown is not the original due date. Allergies as of 8/29/2017  Review Complete On: 8/29/2017 By: Concepción Marshall NP Severity Noted Reaction Type Reactions Statins-hmg-coa Reductase Inhibitors  09/04/2013    Other (comments) Other reaction(s): Other (comments) Severs leg pain(Zetia, Crestor) Severs leg pain(Zetia, Crestor) Current Immunizations  Reviewed on 8/29/2017 Name Date Influenza High Dose Vaccine PF 10/5/2016 Influenza Vaccine 10/13/2014 Reviewed by David Wick on 8/29/2017 at  9:36 AM  
 Reviewed by David Wick on 8/29/2017 at  9:37 AM  
Vitals BP Pulse Temp Resp Weight(growth percentile) SpO2  
 132/82 (BP 1 Location: Right arm, BP Patient Position: Sitting) 77 97.5 °F (36.4 °C) (Oral) 19 123 lb (55.8 kg) 98% BMI OB Status Smoking Status 21.11 kg/m2 Postmenopausal Former Smoker Vitals History BMI and BSA Data Body Mass Index Body Surface Area  
 21.11 kg/m 2 1.59 m 2 Preferred Pharmacy Pharmacy Name Phone [id number] [address] [facility] [phone] Your Updated Medication List  
  
   
This list is accurate as of: 8/29/17 11:10 AM.  Always use your most recent med list.  
  
  
  
  
 ALPRAZolam 0.25 mg tablet Commonly known as:  XANAX  
TAKE 1 TABLET BY MOUTH TWICE A DAY AS NEEDED FOR ANXIETY (MAX 2 A DAY) aspirin 81 mg tablet Take 81 mg by mouth. atorvastatin 20 mg tablet Commonly known as:  LIPITOR Take 1 Tab by mouth nightly. escitalopram oxalate 10 mg tablet Commonly known as:  Dmitri Carolus Take 1 Tab by mouth daily. lisinopril 20 mg tablet Commonly known as:  PRINIVIL, ZESTRIL  
TAKE 1 TABLET DAILY TO PREVENT STROKE AND HEART PROBLEMS  
  
 multivitamin tablet Commonly known as:  ONE A DAY Take 1 Tab by mouth daily. mupirocin 2 % ointment Commonly known as:  [facility] Apply  to affected area daily. Introducing \[facility]\"" & HEALTH SERVICES! Marine Ly introduces [employer] patient portal. Now you can access parts of your medical record, email your doctor's office, and request medication refills online. 1. In your internet browser, go to [email or web address] 2. Click on the First Time User? Click Here link in the Sign In box. You will see the New Member Sign Up page. 3. Enter your [employer] Access Code exactly as it appears below. You will not need to use this code after youve completed the sign-up process. If you do not sign up before the expiration date, you must request a new code. · [employer] Access Code: [email or web address] Expires: 10/24/2017  4:07 PM 
 
4. Enter the last four digits of your Social Security Number (xxxx) and Date of Birth (mm/dd/yyyy) as indicated and click Submit. You will be taken to the next sign-up page. 5. Create a [employer] ID. This will be your [employer] login ID and cannot be changed, so think of one that is secure and easy to remember. 6. Create a Mostro password. You can change your password at any time. 7. Enter your Password Reset Question and Answer. This can be used at a later time if you forget your password. 8. Enter your e-mail address. You will receive e-mail notification when new information is available in [address] 9. Click Sign Up. You can now view and download portions of your medical record. 10. Click the Download Summary menu link to download a portable copy of your medical information. If you have questions, please visit the Frequently Asked Questions section of the Most[employer] website. Remember, Most[employer] is NOT to be used for urgent needs. For medical emergencies, dial 911. Now available from your iPhone and Android! Please provide this summary of care documentation to your next provider. Your primary care clinician is listed as Ascencion Humphrey. If you have any questions after today's visit, please call [phone].

## 2017-09-06 DIAGNOSIS — F41.9 ANXIETY: ICD-10-CM

## 2017-09-06 RX ORDER — ESCITALOPRAM OXALATE 10 MG/1
10 TABLET ORAL DAILY
Qty: 30 TAB | Refills: 5 | Status: SHIPPED | OUTPATIENT
Start: 2017-09-06 | End: 2017-10-04 | Stop reason: SDUPTHER

## 2017-09-06 NOTE — TELEPHONE ENCOUNTER
[phone] contact # per Richardson Chanel, need refill called in to [facility] for the following medications:  Lxapro 10 mg tablets  1tab/day  Only 4 pills left acn you please place on rush order.   Thanks,

## 2017-12-08 NOTE — PROGRESS NOTES
[facility]   [facility], [address] [place] [address]  Phone: [phone] Fax: [phone]                                              OFFICE NOTE    NAME: Alex Sharma  : 1932    Chief Complaint:   Chief Complaint   Patient presents with    GERD     acid indigeston   family history of colon cancer    History: Alex Sharma is a 80 y.o. WHITE OR  female referred for GI complaints. She has been having a lot of lower gas with belching. She eats well and has no dysphagia. She has never had an EGD and is not on a PPI. She needs an EGD and colonoscopy. This is  the patient's second colonoscopy. The previous one was normal.  The last colonoscopy was in . The bowel movements are regular and brown. She does not use any meds on a regular basis for her bowels. She denies any blood in stools or hemorrhoidal disease. Family history is positive for father with colon cancer or colon polyps. Past Medical History:   Diagnosis Date    Essential hypertension     Hypercholesterolemia     [facility]roke [facility])      Past Surgical History:   Procedure Laterality Date    HX COLONOSCOPY      HX GYN       VI,Para V,SAB i        Current Outpatient Prescriptions   Medication Sig Dispense Refill    aspirin 81 mg tablet Take 81 mg by mouth.  escitalopram oxalate (LEXAPRO) 10 mg tablet Take 1 Tab by mouth daily. 90 Tab 3    atorvastatin (LIPITOR) 20 mg tablet Take 1 Tab by mouth nightly. 90 Tab 3    lisinopril (PRINIVIL, ZESTRIL) 20 mg tablet Take 1 Tab by mouth daily. Bedtime. 90 Tab 3    mupirocin (BACTROBAN) 2 % ointment Apply  to affected area daily. 22 g 0    multivitamin (ONE A DAY) tablet Take 1 Tab by mouth daily. Allergies   Allergen Reactions    Statins-Hmg-Coa Reductase Inhibitors Other (comments)     Other reaction(s):  Other (comments)  Severs leg pain(Zetia, Crestor)  Severs leg pain(Zetia, Crestor)     Social History     Social History    Marital status:      Spouse name: N/A    Number of children: N/A    Years of education: N/A     Occupational History    Not on file. Social History Main Topics    Smoking status: Former Smoker     Quit date: 9/4/1990    Smokeless tobacco: Never Used    Alcohol use Yes    Drug use: Not on file    Sexual activity: Not on file     Other Topics Concern     Service No    Blood Transfusions No    Caffeine Concern No    Occupational Exposure No    Hobby Hazards No    Sleep Concern No    Stress Concern No    Weight Concern No    Special Diet No    Back Care Yes    Exercise Yes     normal daily activity     Bike Helmet Yes    Seat Belt Yes    Self-Exams Yes     Social History Narrative     Family History   Problem Relation Age of Onset    No Known Problems Mother        Patient Active Problem List   Diagnosis Code    OA (osteoarthritis) M19.90    Stroke ([facility] [address] I63.9    Menopausal state N95.1    Other and unspecified hyperlipidemia E78.5    HTN (hypertension) I10    Hyperlipidemia E78.5    Stenosis of both internal carotid arteries I65.23    Cerebral microvascular disease I67.9       Review of Systems:  Review of Systems   Constitutional: Negative for chills, fever, malaise/fatigue and weight loss. HENT: Negative for congestion, ear discharge, ear pain, hearing loss, nosebleeds, sore throat and tinnitus. Eyes: Negative for blurred vision, double vision, pain, discharge and redness. Respiratory: Negative for cough, sputum production, shortness of breath and wheezing. Cardiovascular: Negative for chest pain, palpitations and leg swelling. Gastrointestinal: Positive for heartburn. Negative for abdominal pain, blood in stool, constipation, diarrhea, melena, nausea and vomiting. Genitourinary: Negative for frequency, hematuria and urgency. Musculoskeletal: Negative for back pain, joint pain and neck pain. Skin: Negative for itching and rash.    Neurological: Negative for dizziness, tremors, focal weakness, seizures, weakness and headaches. Endo/Heme/Allergies: Does not bruise/bleed easily. Psychiatric/Behavioral: Negative for depression and memory loss. The patient is not nervous/anxious and does not have insomnia. Physical Exam:  Visit Vitals    /74 (BP 1 Location: Left arm, BP Patient Position: Sitting)    Pulse 73    Ht 5' 3.5\" (1.613 m)    Wt 131 lb (59.4 kg)    BMI 22.84 kg/m2       Physical Exam   Constitutional: She is oriented to person, place, and time. She appears well-developed and well-nourished. HENT:   Head: Normocephalic and atraumatic. Right Ear: External ear normal.   Left Ear: External ear normal.   Nose: Nose normal.   Mouth/Throat: Oropharynx is clear and moist. No oropharyngeal exudate. Eyes: Conjunctivae and EOM are normal. Pupils are equal, round, and reactive to light. Right eye exhibits no discharge. Left eye exhibits no discharge. No scleral icterus. Neck: Neck supple. No JVD present. No tracheal deviation present. No thyromegaly present. Cardiovascular: Normal rate, regular rhythm and normal heart sounds. Exam reveals no gallop and no friction rub. No murmur heard. Pulmonary/Chest: Effort normal and breath sounds normal. She has no wheezes. She has no rales. Abdominal: Soft. Bowel sounds are normal. She exhibits no distension and no mass. There is no tenderness. There is no rebound. Musculoskeletal: Normal range of motion. Lymphadenopathy:     She has no cervical adenopathy. Neurological: She is alert and oriented to person, place, and time. Skin: Skin is warm and dry. No rash noted. No erythema. Psychiatric: She has a normal mood and affect. Her behavior is normal. Judgment and thought content normal.       Impression:  GERD  Family history of colon cancer    Plan:  EGD/colonoscopy on 3/06/99  Risks and complications were explained to patient and they voiced understanding.     Eva Ruiz M.D.

## 2017-12-14 ENCOUNTER — DOCUMENTATION ONLY (OUTPATIENT)
Dept: SURGERY | Age: 82
End: 2017-12-14

## 2017-12-14 NOTE — PROGRESS NOTES
Spoke with patient and she stated that she is not ready to do anything right now and she will call sometime after the 1st of the year to reschedule if needed.

## 2021-05-24 ENCOUNTER — HOSPITAL ENCOUNTER (OUTPATIENT)
Dept: LAB | Age: 86
Discharge: HOME | End: 2021-05-24
Payer: MEDICARE

## 2021-05-24 LAB
ALBUMIN SERPL-MCNC: 4 G/DL (ref 3.5–5)
ALBUMIN/GLOB SERPL: 1.2 {RATIO} (ref 1.1–2.2)
ALP SERPL-CCNC: 60 U/L (ref 45–117)
ALT SERPL-CCNC: 35 U/L (ref 12–78)
ANION GAP SERPL CALC-SCNC: 10 MMOL/L (ref 5–15)
APPEARANCE UR: CLEAR
AST SERPL-CCNC: 26 U/L (ref 15–37)
BACTERIA URNS QL MICRO: ABNORMAL /HPF
BASOPHILS # BLD: 0 K/UL (ref 0–0.1)
BASOPHILS NFR BLD: 1 % (ref 0–1)
BILIRUB SERPL-MCNC: 0.5 MG/DL (ref 0.2–1)
BILIRUB UR QL CFM: NEGATIVE
BUN SERPL-MCNC: 20 MG/DL (ref 6–20)
BUN/CREAT SERPL: 25 (ref 12–20)
CALCIUM SERPL-MCNC: 9.4 MG/DL (ref 8.5–10.1)
CHLORIDE SERPL-SCNC: 104 MMOL/L (ref 97–108)
CO2 SERPL-SCNC: 29 MMOL/L (ref 21–32)
COLOR UR: ABNORMAL
CREAT SERPL-MCNC: 0.8 MG/DL (ref 0.55–1.02)
DIFFERENTIAL METHOD BLD: NORMAL
EOSINOPHIL # BLD: 0 K/UL (ref 0–0.4)
EOSINOPHIL NFR BLD: 1 % (ref 0–7)
EPITH CASTS URNS QL MICRO: ABNORMAL /LPF
ERYTHROCYTE [DISTWIDTH] IN BLOOD BY AUTOMATED COUNT: 13.8 % (ref 11.5–14.5)
GLOBULIN SER CALC-MCNC: 3.4 G/DL (ref 2–4)
GLUCOSE SERPL-MCNC: 123 MG/DL (ref 65–100)
GLUCOSE UR STRIP.AUTO-MCNC: NEGATIVE MG/DL
HCT VFR BLD AUTO: 44.8 % (ref 35–47)
HGB BLD-MCNC: 14.8 G/DL (ref 11.5–16)
HGB UR QL STRIP: ABNORMAL
IMM GRANULOCYTES # BLD AUTO: 0 K/UL (ref 0–0.04)
IMM GRANULOCYTES NFR BLD AUTO: 0 % (ref 0–0.5)
KETONES UR QL STRIP.AUTO: NEGATIVE MG/DL
LEUKOCYTE ESTERASE UR QL STRIP.AUTO: ABNORMAL
LYMPHOCYTES # BLD: 1.2 K/UL (ref 0.8–3.5)
LYMPHOCYTES NFR BLD: 26 % (ref 12–49)
MCH RBC QN AUTO: 29.9 PG (ref 26–34)
MCHC RBC AUTO-ENTMCNC: 33 G/DL (ref 30–36.5)
MCV RBC AUTO: 90.5 FL (ref 80–99)
MONOCYTES # BLD: 0.4 K/UL (ref 0–1)
MONOCYTES NFR BLD: 8 % (ref 5–13)
MUCOUS THREADS URNS QL MICRO: ABNORMAL /LPF
NEUTS SEG # BLD: 3 K/UL (ref 1.8–8)
NEUTS SEG NFR BLD: 64 % (ref 32–75)
NITRITE UR QL STRIP.AUTO: NEGATIVE
NRBC # BLD: 0 K/UL (ref 0–0.01)
NRBC BLD-RTO: 0 PER 100 WBC
PH UR STRIP: 6 [PH] (ref 5–8)
PLATELET # BLD AUTO: 200 K/UL (ref 150–400)
PMV BLD AUTO: 9.8 FL (ref 8.9–12.9)
POTASSIUM SERPL-SCNC: 3.8 MMOL/L (ref 3.5–5.1)
PROT SERPL-MCNC: 7.4 G/DL (ref 6.4–8.2)
PROT UR STRIP-MCNC: 100 MG/DL
RBC # BLD AUTO: 4.95 M/UL (ref 3.8–5.2)
RBC #/AREA URNS HPF: ABNORMAL /HPF (ref 0–5)
SODIUM SERPL-SCNC: 143 MMOL/L (ref 136–145)
SP GR UR REFRACTOMETRY: 1.02 (ref 1–1.03)
TSH SERPL DL<=0.05 MIU/L-ACNC: 1.7 UIU/ML (ref 0.36–3.74)
UROBILINOGEN UR QL STRIP.AUTO: 0.2 EU/DL (ref 0.2–1)
WBC # BLD AUTO: 4.6 K/UL (ref 3.6–11)
WBC URNS QL MICRO: ABNORMAL /HPF (ref 0–4)

## 2021-05-24 PROCEDURE — 80053 COMPREHEN METABOLIC PANEL: CPT

## 2021-05-24 PROCEDURE — 85025 COMPLETE CBC W/AUTO DIFF WBC: CPT

## 2021-05-24 PROCEDURE — 87186 SC STD MICRODIL/AGAR DIL: CPT

## 2021-05-24 PROCEDURE — 81001 URINALYSIS AUTO W/SCOPE: CPT

## 2021-05-24 PROCEDURE — 84443 ASSAY THYROID STIM HORMONE: CPT

## 2021-05-24 PROCEDURE — 87077 CULTURE AEROBIC IDENTIFY: CPT

## 2021-05-24 PROCEDURE — 87086 URINE CULTURE/COLONY COUNT: CPT

## 2021-05-27 ENCOUNTER — HOSPITAL ENCOUNTER (OUTPATIENT)
Dept: CT IMAGING | Age: 86
Discharge: HOME | End: 2021-05-27
Attending: FAMILY MEDICINE
Payer: MEDICARE

## 2021-05-27 DIAGNOSIS — R63.4 LOSS OF WEIGHT: ICD-10-CM

## 2021-05-27 DIAGNOSIS — R11.10 VOMITING: ICD-10-CM

## 2021-05-27 PROCEDURE — 74177 CT ABD & PELVIS W/CONTRAST: CPT

## 2021-05-27 PROCEDURE — 74011000636 HC RX REV CODE- 636

## 2021-05-27 RX ADMIN — IOHEXOL 50 ML: 240 INJECTION, SOLUTION INTRATHECAL; INTRAVASCULAR; INTRAVENOUS; ORAL at 09:30

## 2021-05-27 RX ADMIN — IOPAMIDOL 100 ML: 612 INJECTION, SOLUTION INTRAVENOUS at 10:49

## 2021-05-28 LAB
BACTERIA SPEC CULT: ABNORMAL
BACTERIA SPEC CULT: ABNORMAL
CC UR VC: ABNORMAL
SERVICE CMNT-IMP: ABNORMAL

## 2021-06-07 ENCOUNTER — TRANSCRIBE ORDER (OUTPATIENT)
Dept: REGISTRATION | Age: 86
End: 2021-06-07

## 2021-06-07 ENCOUNTER — HOSPITAL ENCOUNTER (OUTPATIENT)
Dept: GENERAL RADIOLOGY | Age: 86
Discharge: HOME | End: 2021-06-07
Payer: MEDICARE

## 2021-06-07 DIAGNOSIS — K59.00 CONSTIPATION: Primary | ICD-10-CM

## 2021-06-07 DIAGNOSIS — K59.00 CONSTIPATION: ICD-10-CM

## 2021-06-07 PROCEDURE — 74018 RADEX ABDOMEN 1 VIEW: CPT

## 2021-06-11 RX ORDER — CLOPIDOGREL BISULFATE 75 MG/1
75 TABLET ORAL DAILY
COMMUNITY
End: 2021-07-16

## 2021-06-14 ENCOUNTER — ANESTHESIA EVENT (OUTPATIENT)
Dept: ENDOSCOPY | Age: 86
End: 2021-06-14
Payer: MEDICARE

## 2021-06-14 ENCOUNTER — ANESTHESIA (OUTPATIENT)
Dept: ENDOSCOPY | Age: 86
End: 2021-06-14
Payer: MEDICARE

## 2021-06-14 ENCOUNTER — HOSPITAL ENCOUNTER (OUTPATIENT)
Age: 86
Setting detail: OUTPATIENT SURGERY
Discharge: HOME | End: 2021-06-14
Attending: INTERNAL MEDICINE | Admitting: INTERNAL MEDICINE
Payer: MEDICARE

## 2021-06-14 VITALS
RESPIRATION RATE: 19 BRPM | WEIGHT: 121 LBS | SYSTOLIC BLOOD PRESSURE: 139 MMHG | BODY MASS INDEX: 20.66 KG/M2 | HEIGHT: 64 IN | TEMPERATURE: 97.8 F | HEART RATE: 60 BPM | OXYGEN SATURATION: 94 % | DIASTOLIC BLOOD PRESSURE: 80 MMHG

## 2021-06-14 PROCEDURE — 74011250636 HC RX REV CODE- 250/636: Performed by: ANESTHESIOLOGY

## 2021-06-14 PROCEDURE — 76060000031 HC ANESTHESIA FIRST 0.5 HR: Performed by: INTERNAL MEDICINE

## 2021-06-14 PROCEDURE — 88305 TISSUE EXAM BY PATHOLOGIST: CPT

## 2021-06-14 PROCEDURE — 74011000250 HC RX REV CODE- 250: Performed by: ANESTHESIOLOGY

## 2021-06-14 PROCEDURE — 77030013992 HC SNR POLYP ENDOSC BSC -B: Performed by: INTERNAL MEDICINE

## 2021-06-14 PROCEDURE — 74011250637 HC RX REV CODE- 250/637: Performed by: INTERNAL MEDICINE

## 2021-06-14 PROCEDURE — 77030021593 HC FCPS BIOP ENDOSC BSC -A: Performed by: INTERNAL MEDICINE

## 2021-06-14 PROCEDURE — 2709999900 HC NON-CHARGEABLE SUPPLY: Performed by: INTERNAL MEDICINE

## 2021-06-14 PROCEDURE — 74011250636 HC RX REV CODE- 250/636: Performed by: INTERNAL MEDICINE

## 2021-06-14 PROCEDURE — 76040000019: Performed by: INTERNAL MEDICINE

## 2021-06-14 RX ORDER — FLUMAZENIL 0.1 MG/ML
0.2 INJECTION INTRAVENOUS
Status: DISCONTINUED | OUTPATIENT
Start: 2021-06-14 | End: 2021-06-14 | Stop reason: HOSPADM

## 2021-06-14 RX ORDER — NALOXONE HYDROCHLORIDE 0.4 MG/ML
0.4 INJECTION, SOLUTION INTRAMUSCULAR; INTRAVENOUS; SUBCUTANEOUS
Status: DISCONTINUED | OUTPATIENT
Start: 2021-06-14 | End: 2021-06-14 | Stop reason: HOSPADM

## 2021-06-14 RX ORDER — SODIUM CHLORIDE 0.9 % (FLUSH) 0.9 %
5-40 SYRINGE (ML) INJECTION EVERY 8 HOURS
Status: DISCONTINUED | OUTPATIENT
Start: 2021-06-14 | End: 2021-06-14 | Stop reason: HOSPADM

## 2021-06-14 RX ORDER — SODIUM CHLORIDE 0.9 % (FLUSH) 0.9 %
5-40 SYRINGE (ML) INJECTION AS NEEDED
Status: DISCONTINUED | OUTPATIENT
Start: 2021-06-14 | End: 2021-06-14 | Stop reason: HOSPADM

## 2021-06-14 RX ORDER — MIDAZOLAM HYDROCHLORIDE 1 MG/ML
.25-5 INJECTION, SOLUTION INTRAMUSCULAR; INTRAVENOUS
Status: DISCONTINUED | OUTPATIENT
Start: 2021-06-14 | End: 2021-06-14 | Stop reason: HOSPADM

## 2021-06-14 RX ORDER — LIDOCAINE HYDROCHLORIDE 20 MG/ML
INJECTION, SOLUTION EPIDURAL; INFILTRATION; INTRACAUDAL; PERINEURAL AS NEEDED
Status: DISCONTINUED | OUTPATIENT
Start: 2021-06-14 | End: 2021-06-14 | Stop reason: HOSPADM

## 2021-06-14 RX ORDER — DEXTROMETHORPHAN/PSEUDOEPHED 2.5-7.5/.8
1.2 DROPS ORAL
Status: DISCONTINUED | OUTPATIENT
Start: 2021-06-14 | End: 2021-06-14 | Stop reason: HOSPADM

## 2021-06-14 RX ORDER — SODIUM CHLORIDE 9 MG/ML
75 INJECTION, SOLUTION INTRAVENOUS CONTINUOUS
Status: DISCONTINUED | OUTPATIENT
Start: 2021-06-14 | End: 2021-06-14 | Stop reason: HOSPADM

## 2021-06-14 RX ORDER — PROPOFOL 10 MG/ML
INJECTION, EMULSION INTRAVENOUS AS NEEDED
Status: DISCONTINUED | OUTPATIENT
Start: 2021-06-14 | End: 2021-06-14 | Stop reason: HOSPADM

## 2021-06-14 RX ORDER — EPINEPHRINE 0.1 MG/ML
1 INJECTION INTRACARDIAC; INTRAVENOUS
Status: DISCONTINUED | OUTPATIENT
Start: 2021-06-14 | End: 2021-06-14 | Stop reason: HOSPADM

## 2021-06-14 RX ORDER — FENTANYL CITRATE 50 UG/ML
25 INJECTION, SOLUTION INTRAMUSCULAR; INTRAVENOUS
Status: DISCONTINUED | OUTPATIENT
Start: 2021-06-14 | End: 2021-06-14 | Stop reason: HOSPADM

## 2021-06-14 RX ORDER — ATROPINE SULFATE 0.1 MG/ML
0.5 INJECTION INTRAVENOUS
Status: DISCONTINUED | OUTPATIENT
Start: 2021-06-14 | End: 2021-06-14 | Stop reason: HOSPADM

## 2021-06-14 RX ADMIN — SIMETHICONE 80 MG: 20 SUSPENSION/ DROPS ORAL at 09:09

## 2021-06-14 RX ADMIN — LIDOCAINE HYDROCHLORIDE 20 MG: 20 INJECTION, SOLUTION EPIDURAL; INFILTRATION; INTRACAUDAL; PERINEURAL at 08:52

## 2021-06-14 RX ADMIN — SODIUM CHLORIDE 75 ML/HR: 900 INJECTION, SOLUTION INTRAVENOUS at 08:38

## 2021-06-14 RX ADMIN — PROPOFOL 120 MG: 10 INJECTION, EMULSION INTRAVENOUS at 09:12

## 2021-06-14 NOTE — ANESTHESIA PREPROCEDURE EVALUATION
Relevant Problems   NEUROLOGY   (+) Stroke (HCC)      CARDIOVASCULAR   (+) HTN (hypertension)       Anesthetic History   No history of anesthetic complications            Review of Systems / Medical History  Patient summary reviewed, nursing notes reviewed and pertinent labs reviewed    Pulmonary                Comments: Former smoker - Quit 1990   Neuro/Psych       CVA  TIA    Comments: Cerebral microvascular disease Cardiovascular    Hypertension          Hyperlipidemia      Comments: Stenosis of both internal carotid arteries    ECG (6/4/17):   Normal sinus rhythm   Minimal voltage criteria for LVH, may be normal variant   Borderline ECG   No previous ECGs available    GI/Hepatic/Renal               Comments: Change in bowel habits Endo/Other        Arthritis     Other Findings              Physical Exam    Airway  Mallampati: II  TM Distance: > 6 cm  Neck ROM: normal range of motion   Mouth opening: Normal     Cardiovascular  Regular rate and rhythm,  S1 and S2 normal,  no murmur, click, rub, or gallop             Dental    Dentition: Caps/crowns     Pulmonary  Breath sounds clear to auscultation               Abdominal  GI exam deferred       Other Findings            Anesthetic Plan    ASA: 3  Anesthesia type: MAC and total IV anesthesia          Induction: Intravenous  Anesthetic plan and risks discussed with: Patient

## 2021-06-14 NOTE — PROGRESS NOTES
Patient has had covid vaccine. unable to enter dates of vaccine into immunizations because she forgot her card for dates.

## 2021-06-14 NOTE — PROCEDURES
NAME:  Mey Cohen   :   1932   MRN:   [id number]     Date/Time:  2021 9:22 AM    Colonoscopy Operative Report    Procedure Type:   Colonoscopy with polypectomy (cold snare)     Indications:     Change in bowel habits  Pre-operative Diagnosis: see indication above  Post-operative Diagnosis:  See findings below  :  Ronal Aguero MD  Referring Provider: Sameer Gomes NP    Exam:  Airway: clear, no airway problems anticipated  Heart: RRR, without gallops or rubs  Lungs: clear bilaterally without wheezes, crackles, or rhonchi  Abdomen: soft, nontender, nondistended, bowel sounds present  Mental Status: awake, alert and oriented to person, place and time    Sedation:  MAC anesthesia Propofol 120mg IV  Procedure Details:  After informed consent was obtained with all risks and benefits of procedure explained and preoperative exam completed, the patient was taken to the endoscopy suite and placed in the left lateral decubitus position. Upon sequential sedation as per above, a digital rectal exam was performed demonstrating internal hemorrhoids. The Olympus videocolonoscope  was inserted in the rectum and carefully advanced to the cecum, which was identified by the ileocecal valve and appendiceal orifice. The quality of preparation was adequate. The colonoscope was slowly withdrawn with careful evaluation between folds. Retroflexion in the rectum was completed demonstrating internal hemorrhoids. Findings:     -Normal terminal ileum  -Single diminutive 3mm sessile polyp in the ascending colon at 88cm; removed with cold snare  -Significant sigmoid colon diverticulosis  -Grade 1 internal hemorrhoids    Specimen Removed:  #1 asc colon polyp  Complications: None. EBL:  None.     Impression:    -Normal terminal ileum  -Single diminutive 3mm sessile polyp in the ascending colon at 88cm; removed with cold snare  -Significant sigmoid colon diverticulosis  -Grade 1 internal hemorrhoids    Recommendations: --Await pathology. , -No repeat colonoscopy indicated. High fiber diet. Resume normal medication(s). You will receive a letter about the biopsy results in about 10 days. You may be asked to call your doctor's office for the results. Resume Plavix tomorrow. Discharge Disposition:  Home in the company of a  when able to ambulate.     Susan Coelho MD

## 2021-06-14 NOTE — ANESTHESIA POSTPROCEDURE EVALUATION
Procedure(s):  COLONOSCOPY  ENDOSCOPIC POLYPECTOMY. total IV anesthesia    Anesthesia Post Evaluation        Patient location during evaluation: PACU  Note status: Adequate. Level of consciousness: responsive to verbal stimuli and sleepy but conscious  Pain management: satisfactory to patient  Airway patency: patent  Anesthetic complications: no  Cardiovascular status: acceptable  Respiratory status: acceptable  Hydration status: acceptable  Comments: +Post-Anesthesia Evaluation and Assessment    Patient: Becki Lozano MRN: [id number]  SSN: [id number]   YOB: 1932  Age: 80 y.o. Sex: female      Cardiovascular Function/Vital Signs    /75   Pulse (!) 58   Temp 36.6 °C (97.8 °F)   Resp 19   Ht 5' 4\" (1.626 m)   Wt 54.9 kg (121 lb)   SpO2 98%   Breastfeeding No   BMI 20.77 kg/m²     Patient is status post Procedure(s):  COLONOSCOPY  ENDOSCOPIC POLYPECTOMY. Nausea/Vomiting: Controlled. Postoperative hydration reviewed and adequate. Pain:  Pain Scale 1: Numeric (0 - 10) (06/14/21 0924)  Pain Intensity 1: 0 (06/14/21 0924)   Managed. Neurological Status: At baseline. Mental Status and Level of Consciousness: Arousable. Pulmonary Status:   O2 Device: None (Room air) (06/14/21 0930)   Adequate oxygenation and airway patent. Complications related to anesthesia: None    Post-anesthesia assessment completed. No concerns. Signed By: Jolie Gillespie MD    6/14/2021  Post anesthesia nausea and vomiting:  controlled      INITIAL Post-op Vital signs:   Vitals Value Taken Time   /83 06/14/21 0933   Temp 36.6 °C (97.8 °F) 06/14/21 0924   Pulse 60 06/14/21 0934   Resp 15 06/14/21 0934   SpO2 94 % 06/14/21 0933   Vitals shown include unvalidated device data.

## 2021-06-14 NOTE — DISCHARGE INSTRUCTIONS
Ricardo Palma  [id number]  1/30/1932    COLON DISCHARGE INSTRUCTIONS  Discomfort:  Redness at IV site- apply warm compress to area; if redness or soreness persist- contact your physician  There may be a slight amount of blood passed from the rectum  Gaseous discomfort- walking, belching will help relieve any discomfort  You may not operate a vehicle for 12 hours  You may not engage in an occupation involving machinery or appliances for rest of today  You may not drink alcoholic beverages for at least 12 hours  Avoid making any critical decisions for at least 24 hour  DIET:   High fiber diet. - however -  remember your colon is empty and a heavy meal will produce gas. Avoid these foods:  vegetables, fried / greasy foods, carbonated drinks for today  MEDICATION:         ACTIVITY:  You may not resume your normal daily activities until tomorrow AM; it is recommended that you spend the remainder of the day resting -  avoid any strenuous activity. CALL M.D.   ANY SIGN OF:   Increasing pain, nausea, vomiting  Abdominal distension (swelling)  New increased bleeding (oral or rectal)  Fever (chills)  Pain in chest area  Bloody discharge from nose or mouth  Shortness of breath    IMPRESSION:  -Normal terminal ileum  -Single diminutive 3mm sessile polyp in the ascending colon at 88cm; removed with cold snare  -Significant sigmoid colon diverticulosis  -Grade 1 internal hemorrhoids    Follow-up Instructions:   Call Dr. Young Diego for the results of procedure / biopsy in 7-10 days  Telephone # [phone]  No repeat colonoscopy indicated  My office will contact you regarding setting up outpatient endoscopy  Resume Plavix tomorrow    Marika Hardy MD

## 2021-06-14 NOTE — PROGRESS NOTES
Josie Brown  1/30/1932  [id number]    Situation:  Verbal report received from: Dorene Kocher, RN  Procedure: Procedure(s):  COLONOSCOPY  ENDOSCOPIC POLYPECTOMY    Background:    Preoperative diagnosis: CHANGE IN BOWELL HABBITS  Postoperative diagnosis: Diverticulosis, Hemorrhoids, Colon Polyp    :  Dr. Wu Ramirez  Assistant(s): Endoscopy RN-1: Jericho White RN  Endoscopy RN-2: Sharri Malagon    Specimens:   ID Type Source Tests Collected by Time Destination   1 : Ascending Colon Polyp Preservative Colon, Ascending  Merlinda Haskell, MD 6/14/2021 1811 Pathology     H. Pylori  no    Assessment:  Intra-procedure medications   Anesthesia gave intra-procedure sedation and medications, see anesthesia flow sheet yes    Intravenous fluids: NS@ KVO     Vital signs stable yes    Abdominal assessment: round and soft yes    Recommendation:  Discharge patient per MD order yes.   Return to floor n/a  Family or Friend Roque Sebastian  Permission to share finding with family or friend yes

## 2021-06-14 NOTE — PROGRESS NOTES
It has been verified with son Herb Pace that patient last took plavix on 6/4/21. Dr. Gisselle Angel aware.

## 2021-06-14 NOTE — H&P
Gastroenterology Outpatient History and Physical    Patient: Ezequiel Hedrick    Physician: Nicole Mcwilliams MD    Chief Complaint: change in bowel habit  History of Present Illness: 81yo F with change in bowel habits suggestive of constipation. Fam hx CRC in F.. History:  Past Medical History:   Diagnosis Date    Essential hypertension     Hypercholesterolemia     Menopause     Stroke ([facility] [address]       Past Surgical History:   Procedure Laterality Date    HX COLONOSCOPY      HX GYN       VI,Para V,SAB i      Social History     Socioeconomic History    Marital status:      Spouse name: Not on file    Number of children: Not on file    Years of education: Not on file    Highest education level: Not on file   Tobacco Use    Smoking status: Former Smoker     Quit date: 1990     Years since quittin.7    Smokeless tobacco: Never Used   Vaping Use    Vaping Use: Never used   Substance and Sexual Activity    Alcohol use: Not Currently     Comment: stopped May 2021   Other Topics Concern     Service No    Blood Transfusions No    Caffeine Concern No    Occupational Exposure No    Hobby Hazards No    Sleep Concern No    Stress Concern No    Weight Concern No    Special Diet No    Back Care Yes    Exercise Yes     Comment: normal daily activity     Bike Helmet Yes    Seat Belt Yes    Self-Exams Yes     Social Determinants of Health     Financial Resource Strain:     Difficulty of Paying Living Expenses:    Food Insecurity:     Worried About Running Out of Food in the Last Year:     Ran Out of Food in the Last Year:    Transportation Needs:     Lack of Transportation (Medical):      Lack of Transportation (Non-Medical):    Physical Activity:     Days of Exercise per Week:     Minutes of Exercise per Session:    Stress:     Feeling of Stress :    Social Connections:     Frequency of Communication with Friends and Family:     Frequency of Social Gatherings with Friends and Family:     Attends [employer] Services:     Active Member of Clubs or Organizations:     Attends Club or Organization Meetings:     Marital Status:       Family History   Problem Relation Age of Onset    Parkinson's Disease Mother     Cancer Father         Gastric      Patient Active Problem List   Diagnosis Code    OA (osteoarthritis) M19.90    Stroke ([facility] [address] I63.9    Menopausal state N95.1    Other and unspecified hyperlipidemia E78.5    HTN (hypertension) I10    Hyperlipidemia E78.5    Stenosis of both internal carotid arteries I65.23    Cerebral microvascular disease I67.89       Allergies: Allergies   Allergen Reactions    Statins-Hmg-Coa Reductase Inhibitors Other (comments)     Other reaction(s): Other (comments)  Severs leg pain(Zetia, Crestor)  Severs leg pain(Zetia, Crestor)     Medications:   Prior to Admission medications    Medication Sig Start Date End Date Taking? Authorizing Provider   clopidogreL (Plavix) 75 mg tab Take 75 mg by mouth daily. Yes Provider, Historical   atorvastatin (LIPITOR) 20 mg tablet TAKE 1 TABLET NIGHTLY 8/23/19  Yes Live Shelton NP   atenolol (TENORMIN) 25 mg tablet TAKE ONE TABLET BY 2 times a DAY 7/29/19  Yes Live Shelton NP   glucosamine-chondroitin ([address] [place] [address]) 500-400 mg cap Take 1 Cap by mouth daily. Yes Provider, Historical   ibuprofen (MOTRIN) 400 mg tablet Take 1 Tab by mouth every eight (8) hours as needed for Pain. 12/20/18  Yes Live Shelton NP   lisinopril (PRINIVIL, ZESTRIL) 40 mg tablet Take 1 Tab by mouth daily. Bedtime. 11/15/18  Yes Live Shelton NP   fish oil-omega-3 fatty acids (FISH OIL) 340-1,000 mg capsule Take 1 Cap by mouth daily. Yes Provider, Historical   cyanocobalamin (VITAMIN B-12) 1,000 mcg tablet Take 1,000 mcg by mouth daily. Yes Provider, Historical   glucosamine 1,000 mg tab Take  by mouth daily.    Yes Provider, Historical   cholecalciferol, vitamin D3, (VITAMIN D3) 2,000 unit tab Take  by mouth. Yes Provider, Historical   ascorbic acid, vitamin C, (VITAMIN C) 500 mg tablet Take  by mouth. Yes Provider, Historical   multivitamin (ONE A DAY) tablet Take 1 Tab by mouth daily. Yes Provider, Historical   aspirin 81 mg tablet Take 81 mg by mouth daily. Yes Provider, Historical     Physical Exam:   Vital Signs: Blood pressure (!) 166/94, pulse 60, resp. rate 14, height 5' 4\" (1.626 m), weight 54.9 kg (121 lb), SpO2 96 %, not currently breastfeeding.   General: well developed, well nourished   HEENT: unremarkable   Heart: regular rhythm no mumur    Lungs: clear   Abdominal:  benign   Neurological: unremarkable   Extremities: no edema     Findings/Diagnosis: Change in bowel habits, Last Plavix dose 10d ago  Plan of Care/Planned Procedure: colonoscopy with conscious/deep sedation    Signed:  Piedad Phillips MD 6/14/2021

## 2021-06-16 ENCOUNTER — ANESTHESIA EVENT (OUTPATIENT)
Dept: ENDOSCOPY | Age: 86
End: 2021-06-16
Payer: MEDICARE

## 2021-06-17 ENCOUNTER — ANESTHESIA (OUTPATIENT)
Dept: ENDOSCOPY | Age: 86
End: 2021-06-17
Payer: MEDICARE

## 2021-06-17 ENCOUNTER — HOSPITAL ENCOUNTER (OUTPATIENT)
Age: 86
Setting detail: OUTPATIENT SURGERY
Discharge: HOME | End: 2021-06-17
Attending: INTERNAL MEDICINE | Admitting: INTERNAL MEDICINE
Payer: MEDICARE

## 2021-06-17 VITALS
RESPIRATION RATE: 18 BRPM | BODY MASS INDEX: 19.99 KG/M2 | HEIGHT: 65 IN | TEMPERATURE: 97.8 F | WEIGHT: 120 LBS | DIASTOLIC BLOOD PRESSURE: 88 MMHG | OXYGEN SATURATION: 94 % | HEART RATE: 59 BPM | SYSTOLIC BLOOD PRESSURE: 144 MMHG

## 2021-06-17 PROCEDURE — 76040000019: Performed by: INTERNAL MEDICINE

## 2021-06-17 PROCEDURE — 74011250636 HC RX REV CODE- 250/636: Performed by: NURSE ANESTHETIST, CERTIFIED REGISTERED

## 2021-06-17 PROCEDURE — 76060000031 HC ANESTHESIA FIRST 0.5 HR: Performed by: INTERNAL MEDICINE

## 2021-06-17 PROCEDURE — 74011250636 HC RX REV CODE- 250/636: Performed by: INTERNAL MEDICINE

## 2021-06-17 PROCEDURE — 74011000250 HC RX REV CODE- 250: Performed by: NURSE ANESTHETIST, CERTIFIED REGISTERED

## 2021-06-17 PROCEDURE — 77030019988 HC FCPS ENDOSC DISP BSC -B: Performed by: INTERNAL MEDICINE

## 2021-06-17 PROCEDURE — 88305 TISSUE EXAM BY PATHOLOGIST: CPT

## 2021-06-17 PROCEDURE — 2709999900 HC NON-CHARGEABLE SUPPLY: Performed by: INTERNAL MEDICINE

## 2021-06-17 RX ORDER — LIDOCAINE HYDROCHLORIDE 20 MG/ML
INJECTION, SOLUTION EPIDURAL; INFILTRATION; INTRACAUDAL; PERINEURAL AS NEEDED
Status: DISCONTINUED | OUTPATIENT
Start: 2021-06-17 | End: 2021-06-17 | Stop reason: HOSPADM

## 2021-06-17 RX ORDER — SODIUM CHLORIDE 9 MG/ML
25 INJECTION, SOLUTION INTRAVENOUS CONTINUOUS
Status: DISCONTINUED | OUTPATIENT
Start: 2021-06-17 | End: 2021-06-17 | Stop reason: HOSPADM

## 2021-06-17 RX ORDER — PROPOFOL 10 MG/ML
INJECTION, EMULSION INTRAVENOUS AS NEEDED
Status: DISCONTINUED | OUTPATIENT
Start: 2021-06-17 | End: 2021-06-17 | Stop reason: HOSPADM

## 2021-06-17 RX ADMIN — PROPOFOL 10 MG: 10 INJECTION, EMULSION INTRAVENOUS at 07:46

## 2021-06-17 RX ADMIN — SODIUM CHLORIDE 25 ML/HR: 900 INJECTION, SOLUTION INTRAVENOUS at 07:30

## 2021-06-17 RX ADMIN — PROPOFOL 10 MG: 10 INJECTION, EMULSION INTRAVENOUS at 07:44

## 2021-06-17 RX ADMIN — PROPOFOL 20 MG: 10 INJECTION, EMULSION INTRAVENOUS at 07:42

## 2021-06-17 RX ADMIN — LIDOCAINE HYDROCHLORIDE 60 MG: 20 INJECTION, SOLUTION EPIDURAL; INFILTRATION; INTRACAUDAL; PERINEURAL at 07:40

## 2021-06-17 RX ADMIN — PROPOFOL 40 MG: 10 INJECTION, EMULSION INTRAVENOUS at 07:40

## 2021-06-17 NOTE — ANESTHESIA POSTPROCEDURE EVALUATION
Procedure(s):  ESOPHAGOGASTRODUODENOSCOPY (EGD)  ESOPHAGOGASTRODUODENAL (EGD) BIOPSY. MAC, total IV anesthesia    Anesthesia Post Evaluation        Patient location during evaluation: PACU  Note status: Adequate. Level of consciousness: responsive to verbal stimuli and sleepy but conscious  Pain management: satisfactory to patient  Airway patency: patent  Anesthetic complications: no  Cardiovascular status: acceptable  Respiratory status: acceptable  Hydration status: acceptable  Comments: +Post-Anesthesia Evaluation and Assessment    Patient: Ezequiel Hedrick MRN: [id number]  SSN: [id number]   YOB: 1932  Age: 80 y.o. Sex: female      Cardiovascular Function/Vital Signs    BP (!) 144/88   Pulse (!) 59   Temp 36.6 °C (97.8 °F)   Resp 18   Ht 5' 5\" (1.651 m)   Wt 54.4 kg (120 lb)   SpO2 94%   Breastfeeding No   BMI 19.97 kg/m²     Patient is status post Procedure(s):  ESOPHAGOGASTRODUODENOSCOPY (EGD)  ESOPHAGOGASTRODUODENAL (EGD) BIOPSY. Nausea/Vomiting: Controlled. Postoperative hydration reviewed and adequate. Pain:  Pain Scale 1: Numeric (0 - 10) (06/17/21 0818)  Pain Intensity 1: 0 (06/17/21 0818)   Managed. Neurological Status: At baseline. Mental Status and Level of Consciousness: Arousable. Pulmonary Status:   O2 Device: None (Room air) (06/17/21 0818)   Adequate oxygenation and airway patent. Complications related to anesthesia: None    Post-anesthesia assessment completed. No concerns. Signed By: Lula Huang MD    6/17/2021  Post anesthesia nausea and vomiting:  controlled      INITIAL Post-op Vital signs:   Vitals Value Taken Time   BP 92/35 06/17/21 0833   Temp 36.6 °C (97.8 °F) 06/17/21 0757   Pulse 64 06/17/21 0835   Resp 14 06/17/21 0835   SpO2 98 % 06/17/21 0835   Vitals shown include unvalidated device data.

## 2021-06-17 NOTE — ROUTINE PROCESS
[id number] [address] 1/30/1932 
[id number] Situation: 
Verbal report received from: Shanique Morgan RN Procedure: Procedure(s): ESOPHAGOGASTRODUODENOSCOPY (EGD) ESOPHAGOGASTRODUODENAL (EGD) BIOPSY Background: 
 
Preoperative diagnosis: NAUSEA, VOMITING Postoperative diagnosis: Nausea vomitting :   Assistant(s): Endoscopy Technician-1: Joselin Gonzalez Endoscopy RN-1: Heber Lowe RN Specimens:  
ID Type Source Tests Collected by Time Destination 1 : duodenum bx Preservative Duodenum  Lu Gonzalez MD 6/17/2021 0732 Pathology 2 : stomach bx Preservative Stomach  Lu Gonzalez MD 6/17/2021 0745 Pathology 3 : [address] [facility] [address] Lu Gonzalez MD 6/17/2021 8918 Pathology H. Pylori  no Assessment: 
Intra-procedure medications Anesthesia gave intra-procedure sedation and medications, see anesthesia flow sheet Intravenous fluids: NS@ Jennifer Lathe Vital signs stable Abdominal assessment: round and soft Recommendation: 
Discharge patient per MD order Family or Friend Permission to share finding with family or friend yes

## 2021-06-17 NOTE — PROCEDURES
NAME:  Wolfgang Leonard   :   1932   MRN:   [id number]     Date/Time:  2021 7:53 AM    Esophagogastroduodenoscopy (EGD) Procedure Note    Procedure: Esophagogastroduodenoscopy with biopsy    Indication: N/V  Pre-operative Diagnosis: see indication above  Post-operative Diagnosis: see findings below  :  Gardenia Gilliam MD  Referring Provider:   Adriane Connor NP    Exam:  Airway: clear, no airway problems anticipated  Heart: RRR, without gallops or rubs  Lungs: clear bilaterally without wheezes, crackles, or rhonchi  Abdomen: soft, nontender, nondistended, bowel sounds present  Mental Status: awake, alert and oriented to person, place and time     Anethesia/Sedation:  MAC anesthesia Propofol 80mg IV  Procedure Details   After informed consent was obtained for the procedure, with all risks and benefits of procedure explained the patient was taken to the endoscopy suite and placed in the left lateral decubitus position. Following sequential administration of sedation as per above, the [id number] gastroscope was inserted into the mouth and advanced under direct vision to second portion of the duodenum. A careful inspection was made as the gastroscope was withdrawn, including a retroflexed view of the proximal stomach; findings and interventions are described below. Findings:    -Normal esophagus; biopsied to exclude inflammation  -Normal stomach; biopsied to exclude inflammation  -Normal duodenum; biopsied to exclude inflammation    Therapies:  biopsy of esophagus; biopsy of stomach; biopsy of duodenal   Specimens: #1 duod; #2 stomach; #3 g-e jxn  EBL:  None. Complications:   None; patient tolerated the procedure well. Impression:    -Normal esophagus; biopsied to exclude inflammation  -Normal stomach; biopsied to exclude inflammation  -Normal duodenum; biopsied to exclude inflammation    Recommendations:  -Continue acid suppression. , -Await pathology. , -Follow symptoms.     Discharge disposition:  Home in the company of  when able to ambulate    Nicole Mcwilliams MD

## 2021-06-17 NOTE — ROUTINE PROCESS
Patient given verbal and written discharge instructions. Patient given opportunity to ask questions. Patient able to verbalize understanding of these instructions. On patient discharge, discussed patients unsteady gait with her son Gillian Jensen. Suggested that she incorporate her walker in her ADLs. They were both appreciative and in agreement of this plan

## 2021-06-17 NOTE — ANESTHESIA PREPROCEDURE EVALUATION
Relevant Problems   NEUROLOGY   (+) Stroke (HCC)      CARDIOVASCULAR   (+) HTN (hypertension)       Anesthetic History   No history of anesthetic complications            Review of Systems / Medical History  Patient summary reviewed, nursing notes reviewed and pertinent labs reviewed    Pulmonary                Comments: Former smoker - Quit 1990   Neuro/Psych       CVA  TIA    Comments: Cerebral microvascular disease Cardiovascular    Hypertension          Hyperlipidemia    Exercise tolerance: >4 METS  Comments: Stenosis of both internal carotid arteries    ECG (6/4/17):   Normal sinus rhythm   Minimal voltage criteria for LVH, may be normal variant   Borderline ECG   No previous ECGs available    GI/Hepatic/Renal               Comments: Change in bowel habits Endo/Other        Arthritis     Other Findings              Physical Exam    Airway  Mallampati: II  TM Distance: > 6 cm  Neck ROM: normal range of motion   Mouth opening: Normal     Cardiovascular  Regular rate and rhythm,  S1 and S2 normal,  no murmur, click, rub, or gallop             Dental    Dentition: Caps/crowns     Pulmonary  Breath sounds clear to auscultation               Abdominal  GI exam deferred       Other Findings            Anesthetic Plan    ASA: 3  Anesthesia type: MAC and total IV anesthesia          Induction: Intravenous  Anesthetic plan and risks discussed with: Patient

## 2021-06-17 NOTE — DISCHARGE INSTRUCTIONS
Jasmin Montaño  [id number]  1/30/1932    EGD DISCHARGE INSTRUCTIONS  Discomfort:  Sore throat- throat lozenges or warm salt water gargle  redness at IV site- apply warm compress to area; if redness or soreness persist- contact your physician  Gaseous discomfort- walking, belching will help relieve any discomfort  You may not operate a vehicle for 12 hours  You may not engage in an occupation involving machinery or appliances for rest of today  You may not drink alcoholic beverages for at least 12 hours  Avoid making any critical decisions for at least 24 hour  DIET  You may have minimal sips at this time-- do not eat or drink for two hours. You may eat and drink after 0815am  You may resume your regular diet - however -  remember your colon is empty and a heavy meal will produce gas. Avoid these foods:  vegetables, fried / greasy foods, carbonated drinks    MEDICATIONS:        ACTIVITY  You may resume your normal daily activities until tomorrow AM;  Spend the remainder of the day resting -  avoid any strenuous activity. CALL M.D.   ANY SIGN OF   Increasing pain, nausea, vomiting  Abdominal distension (swelling)  New increased bleeding (oral or rectal)  Fever (chills)  Pain in chest area  Bloody discharge from nose or mouth  Shortness of breath    IMPRESSION:  -Normal esophagus; biopsied to exclude inflammation  -Normal stomach; biopsied to exclude inflammation  -Normal duodenum; biopsied to exclude inflammation    Follow-up Instructions:   Call Dr. Marcial Maier for the results of procedure / biopsy in 7-10 days   Telephone # [phone]    Bubba Calderon MD

## 2021-06-23 ENCOUNTER — TRANSCRIBE ORDER (OUTPATIENT)
Dept: SCHEDULING | Age: 86
End: 2021-06-23

## 2021-06-23 DIAGNOSIS — R41.82 ALTERED MENTAL STATUS: ICD-10-CM

## 2021-06-23 DIAGNOSIS — I67.9 CEREBROVASCULAR DISEASE, UNSPECIFIED: Primary | ICD-10-CM

## 2021-06-23 DIAGNOSIS — A08.11 EPIDEMIC VOMITING SYNDROME: ICD-10-CM

## 2021-06-30 ENCOUNTER — HOSPITAL ENCOUNTER (OUTPATIENT)
Dept: MRI IMAGING | Age: 86
Discharge: HOME | End: 2021-06-30
Attending: FAMILY MEDICINE
Payer: MEDICARE

## 2021-06-30 DIAGNOSIS — A08.11 EPIDEMIC VOMITING SYNDROME: ICD-10-CM

## 2021-06-30 DIAGNOSIS — R41.82 ALTERED MENTAL STATUS: ICD-10-CM

## 2021-06-30 DIAGNOSIS — I67.9 CEREBROVASCULAR DISEASE, UNSPECIFIED: ICD-10-CM

## 2021-06-30 PROCEDURE — A9575 INJ GADOTERATE MEGLUMI 0.1ML: HCPCS

## 2021-06-30 PROCEDURE — 70551 MRI BRAIN STEM W/O DYE: CPT

## 2021-06-30 PROCEDURE — 74011250636 HC RX REV CODE- 250/636

## 2021-06-30 PROCEDURE — 70552 MRI BRAIN STEM W/DYE: CPT

## 2021-06-30 RX ORDER — GADOTERATE MEGLUMINE 376.9 MG/ML
15 INJECTION INTRAVENOUS
Status: COMPLETED | OUTPATIENT
Start: 2021-06-30 | End: 2021-06-30

## 2021-06-30 RX ADMIN — GADOTERATE MEGLUMINE 13 ML: 376.9 INJECTION INTRAVENOUS at 11:25

## 2021-07-02 ENCOUNTER — HOSPITAL ENCOUNTER (INPATIENT)
Age: 86
LOS: 14 days | Discharge: NURSING, REHAB OR LONG-TERM CARE | DRG: 025 | End: 2021-07-16
Attending: EMERGENCY MEDICINE | Admitting: FAMILY MEDICINE
Payer: MEDICARE

## 2021-07-02 ENCOUNTER — HOSPITAL ENCOUNTER (OUTPATIENT)
Dept: INFUSION THERAPY | Age: 86
Discharge: HOME | End: 2021-07-02

## 2021-07-02 ENCOUNTER — OFFICE VISIT (OUTPATIENT)
Dept: ONCOLOGY | Age: 86
End: 2021-07-02
Payer: MEDICARE

## 2021-07-02 VITALS
RESPIRATION RATE: 16 BRPM | OXYGEN SATURATION: 95 % | HEIGHT: 64 IN | BODY MASS INDEX: 20.04 KG/M2 | DIASTOLIC BLOOD PRESSURE: 90 MMHG | WEIGHT: 117.4 LBS | HEART RATE: 61 BPM | SYSTOLIC BLOOD PRESSURE: 150 MMHG | TEMPERATURE: 97.8 F

## 2021-07-02 DIAGNOSIS — C79.31 MALIGNANT MELANOMA METASTATIC TO BRAIN (HCC): ICD-10-CM

## 2021-07-02 DIAGNOSIS — C79.31 BRAIN METASTASES (HCC): Primary | ICD-10-CM

## 2021-07-02 DIAGNOSIS — G93.89 BRAIN MASS: ICD-10-CM

## 2021-07-02 DIAGNOSIS — C79.31 BRAIN METASTASIS (HCC): Primary | ICD-10-CM

## 2021-07-02 DIAGNOSIS — Z71.89 GOALS OF CARE, COUNSELING/DISCUSSION: ICD-10-CM

## 2021-07-02 LAB
ALBUMIN SERPL-MCNC: 3.6 G/DL (ref 3.5–5)
ALBUMIN/GLOB SERPL: 1 {RATIO} (ref 1.1–2.2)
ALP SERPL-CCNC: 102 U/L (ref 45–117)
ALT SERPL-CCNC: 33 U/L (ref 12–78)
ANION GAP SERPL CALC-SCNC: 7 MMOL/L (ref 5–15)
AST SERPL-CCNC: 26 U/L (ref 15–37)
BILIRUB SERPL-MCNC: 0.5 MG/DL (ref 0.2–1)
BUN SERPL-MCNC: 15 MG/DL (ref 6–20)
BUN/CREAT SERPL: 19 (ref 12–20)
CALCIUM SERPL-MCNC: 9.7 MG/DL (ref 8.5–10.1)
CHLORIDE SERPL-SCNC: 102 MMOL/L (ref 97–108)
CO2 SERPL-SCNC: 28 MMOL/L (ref 21–32)
COMMENT, HOLDF: NORMAL
CREAT SERPL-MCNC: 0.81 MG/DL (ref 0.55–1.02)
GLOBULIN SER CALC-MCNC: 3.7 G/DL (ref 2–4)
GLUCOSE SERPL-MCNC: 124 MG/DL (ref 65–100)
INR PPP: 1 (ref 0.9–1.1)
POTASSIUM SERPL-SCNC: 3.8 MMOL/L (ref 3.5–5.1)
PROT SERPL-MCNC: 7.3 G/DL (ref 6.4–8.2)
PROTHROMBIN TIME: 10.8 SEC (ref 9–11.1)
SAMPLES BEING HELD,HOLD: NORMAL
SODIUM SERPL-SCNC: 137 MMOL/L (ref 136–145)

## 2021-07-02 PROCEDURE — 99285 EMERGENCY DEPT VISIT HI MDM: CPT

## 2021-07-02 PROCEDURE — 93005 ELECTROCARDIOGRAM TRACING: CPT

## 2021-07-02 PROCEDURE — 96374 THER/PROPH/DIAG INJ IV PUSH: CPT

## 2021-07-02 PROCEDURE — 36415 COLL VENOUS BLD VENIPUNCTURE: CPT

## 2021-07-02 PROCEDURE — 86901 BLOOD TYPING SEROLOGIC RH(D): CPT

## 2021-07-02 PROCEDURE — 99204 OFFICE O/P NEW MOD 45 MIN: CPT | Performed by: INTERNAL MEDICINE

## 2021-07-02 PROCEDURE — 96375 TX/PRO/DX INJ NEW DRUG ADDON: CPT

## 2021-07-02 PROCEDURE — 80053 COMPREHEN METABOLIC PANEL: CPT

## 2021-07-02 PROCEDURE — 65660000000 HC RM CCU STEPDOWN

## 2021-07-02 PROCEDURE — 85610 PROTHROMBIN TIME: CPT

## 2021-07-02 RX ORDER — ACETAMINOPHEN 325 MG/1
650 TABLET ORAL
Status: DISCONTINUED | OUTPATIENT
Start: 2021-07-02 | End: 2021-07-16 | Stop reason: HOSPADM

## 2021-07-02 RX ORDER — ONDANSETRON 4 MG/1
8 TABLET, FILM COATED ORAL
Status: ON HOLD | COMMUNITY
End: 2021-07-16 | Stop reason: SDUPTHER

## 2021-07-02 RX ORDER — SODIUM CHLORIDE 0.9 % (FLUSH) 0.9 %
5-40 SYRINGE (ML) INJECTION EVERY 8 HOURS
Status: DISCONTINUED | OUTPATIENT
Start: 2021-07-02 | End: 2021-07-16 | Stop reason: HOSPADM

## 2021-07-02 RX ORDER — ACETAMINOPHEN 650 MG/1
650 SUPPOSITORY RECTAL
Status: DISCONTINUED | OUTPATIENT
Start: 2021-07-02 | End: 2021-07-16 | Stop reason: HOSPADM

## 2021-07-02 RX ORDER — SODIUM CHLORIDE 0.9 % (FLUSH) 0.9 %
5-40 SYRINGE (ML) INJECTION AS NEEDED
Status: DISCONTINUED | OUTPATIENT
Start: 2021-07-02 | End: 2021-07-16 | Stop reason: HOSPADM

## 2021-07-02 NOTE — Clinical Note
7/2/2021    Patient: Robert Amaral   YOB: 1932   Date of Visit: 7/2/2021     Zander Eldridge NP  Via     Dear Zander Eldridge NP,      Thank you for referring Ms. Nancy Guaman to [facility] for evaluation. My notes for this consultation are attached. If you have questions, please do not hesitate to call me. I look forward to following your patient along with you.       Sincerely,    Kristyn Barber MD

## 2021-07-02 NOTE — PROGRESS NOTES
Marleny Tian is a 80 y.o. female new patient today referred by KELLY Alcala NP for Metastatic Brain Lesions. Per Daughter patient was doing well last Easter, She went to the beach in May 2021 she started having nausea and vomiting during this time. Patient has lost 20 lbs in the last 2 years. Patient is having problems with constipation. She is not eating a lot and when she eats she is having nausea and vomiting. Patient's last colonoscopy was 6/14/2021 and Endoscopy was 6/17/2021 both by DR Iraj Webber.

## 2021-07-02 NOTE — PROGRESS NOTES
Reason for Visit:   Miri Chappell is a 80 y.o. female who is seen for new brain metastasis. Treatment History:   Dx: Multiple Brain Mets    History of Present Illness:   Here for above, problems with constipation/n/v for several months. Upper and lower endoscopies were unrevealing. MRI brain showing multiple mets--possible melanoma. Family wants to be aggressive. Patient denies specific complaints. Past Medical History:   Diagnosis Date    Essential hypertension     Hypercholesterolemia     Menopause     Stroke [facility])       Past Surgical History:   Procedure Laterality Date    COLONOSCOPY N/A 2021    COLONOSCOPY performed by Kaylee Morrow MD at [address] [place] [address]  2021         HX COLONOSCOPY      HX GYN       VI,Para V,SAB i    UPPER GI ENDOSCOPY,BIOPSY  2021           Social History     Tobacco Use    Smoking status: Former Smoker     Packs/day: 0.25     Years: 25.00     Pack years: 6.25     Types: Cigarettes     Start date:      Quit date: 1990     Years since quittin.8    Smokeless tobacco: Never Used   Substance Use Topics    Alcohol use: Not Currently     Comment: stopped May 2021      Family History   Problem Relation Age of Onset    Parkinson's Disease Mother     Cancer Father         Gastric     Current Outpatient Medications   Medication Sig    clopidogreL (Plavix) 75 mg tab Take 75 mg by mouth daily.  atorvastatin (LIPITOR) 20 mg tablet TAKE 1 TABLET NIGHTLY    atenolol (TENORMIN) 25 mg tablet TAKE ONE TABLET BY 2 times a DAY    glucosamine-chondroitin (ARTHX) 500-400 mg cap Take 1 Cap by mouth daily. (Patient not taking: Reported on 2021)    ibuprofen (MOTRIN) 400 mg tablet Take 1 Tab by mouth every eight (8) hours as needed for Pain. (Patient not taking: Reported on 2021)    lisinopril (PRINIVIL, ZESTRIL) 40 mg tablet Take 1 Tab by mouth daily. Bedtime.     fish oil-omega-3 fatty acids (FISH OIL) 340-1,000 mg capsule Take 1 Cap by mouth daily.  cyanocobalamin (VITAMIN B-12) 1,000 mcg tablet Take 1,000 mcg by mouth daily.  glucosamine 1,000 mg tab Take  by mouth daily. (Patient not taking: Reported on 6/17/2021)    cholecalciferol, vitamin D3, (VITAMIN D3) 2,000 unit tab Take  by mouth.  ascorbic acid, vitamin C, (VITAMIN C) 500 mg tablet Take  by mouth.  multivitamin (ONE A DAY) tablet Take 1 Tab by mouth daily.  aspirin 81 mg tablet Take 81 mg by mouth daily. No current facility-administered medications for this visit. Allergies   Allergen Reactions    Statins-Hmg-Coa Reductase Inhibitors Other (comments)     Other reaction(s): Other (comments)  Severs leg pain(Zetia, Crestor)  Severs leg pain(Zetia, Crestor)        Review of Systems: A complete review of systems was obtained, negative except as described above. Physical Exam:     Visit Vitals  BP (!) 155/90   Pulse 61   Temp 97.8 °F (36.6 °C) (Oral)   Resp 16   Ht 5' 4.29\" (1.633 m)   Wt 117 lb 6.4 oz (53.3 kg)   SpO2 95%   BMI 19.97 kg/m²     ECOG PS: 2  General: No distress  Eyes: PERRLA, anicteric sclerae  HENT: Atraumatic, OP clear  Neck: Supple  Lymphatic: No cervical, supraclavicular, or inguinal adenopathy  Respiratory: CTAB, normal respiratory effort  CV: Normal rate, regular rhythm, no murmurs, no peripheral edema  GI: Soft, nontender, nondistended, no masses, no hepatomegaly, no splenomegaly  MS: Normal gait and station. Digits without clubbing or cyanosis. Skin: No rashes, ecchymoses, or petechiae. Normal temperature, turgor, and texture. Psych: Alert, oriented, appropriate affect, normal judgment/insight    Results:     Lab Results   Component Value Date/Time    WBC 4.6 05/24/2021 11:30 AM    HGB 14.8 05/24/2021 11:30 AM    HCT 44.8 05/24/2021 11:30 AM    PLATELET 982 18/89/8409 11:30 AM    MCV 90.5 05/24/2021 11:30 AM    ABS.  NEUTROPHILS 3.0 05/24/2021 11:30 AM     Lab Results   Component Value Date/Time    Sodium 143 05/24/2021 11:30 AM    Potassium 3.8 05/24/2021 11:30 AM    Chloride 104 05/24/2021 11:30 AM    CO2 29 05/24/2021 11:30 AM    Glucose 123 (H) 05/24/2021 11:30 AM    BUN 20 05/24/2021 11:30 AM    Creatinine 0.80 05/24/2021 11:30 AM    GFR est AA >60 05/24/2021 11:30 AM    GFR est non-AA >60 05/24/2021 11:30 AM    Calcium 9.4 05/24/2021 11:30 AM     Lab Results   Component Value Date/Time    Bilirubin, total 0.5 05/24/2021 11:30 AM    ALT (SGPT) 35 05/24/2021 11:30 AM    Alk. phosphatase 60 05/24/2021 11:30 AM    Protein, total 7.4 05/24/2021 11:30 AM    Albumin 4.0 05/24/2021 11:30 AM    Globulin 3.4 05/24/2021 11:30 AM     CT A/P 5/27/2021  IMPRESSION  No acute intraperitoneal process is identified. Please see above for additional nonemergent incidental findings. MRI brain 6/30/2021  IMPRESSION  1. Multiple (at least 15) supratentorial and infratentorial hemorrhagic  metastases, with some of the largest examples as above. Overall no midline shift  or herniation. MRI brain without contrast 6/30/2021  IMPRESSION  1. Multiple T1 hyperintense lesions in the cerebellum and cerebral hemispheres. Associated edema and relatively mild mass effect. Appearance is suggestive of  multiple metastasis from melanoma. Multiple subacute hemorrhages are less  likely. 2. Chronic age-related volume loss and white matter disease noted. 3. MRI technologists will have patient taken an be seen in the ED as this was  not unexpected finding. Assessment:   1) Brain Metastasis    Plan:   1) Long discussion with patient, daughter and son--will get PET and Biopsy. Discussed therapies briefly. Immunotherapy is mainstay of treatment for melanoma but need to know about specific mutations before beginning systemic treatment. The CNS disease is symptomatic--would start dexamethasone 8mg bid--spoke with Aakash Herrera and they will start at [facility]. Will discuss tissue dx and biopsy with Dr Webb Diss.       Signed By: Irina Benitez MD

## 2021-07-02 NOTE — LETTER
7/2/2021 5:33 PM    Ms. Herbie Christie  [address] [facility]              Sincerely,      Argenis Nicole MD

## 2021-07-02 NOTE — Clinical Note
NOTIFICATION RETURN TO WORK / SCHOOL    7/2/2021 5:33 PM    Ms. Mahesh Martin  [address] [place] [id number]      To Whom It May Concern:    Mahesh Martin is currently under the care of [facility]. She will return to work/school on: ***    If there are questions or concerns please have the patient contact our office.         Sincerely,      Mariajose Altamirano MD

## 2021-07-03 ENCOUNTER — APPOINTMENT (OUTPATIENT)
Dept: CT IMAGING | Age: 86
DRG: 025 | End: 2021-07-03
Attending: INTERNAL MEDICINE
Payer: MEDICARE

## 2021-07-03 LAB
ABO + RH BLD: NORMAL
ANION GAP SERPL CALC-SCNC: 9 MMOL/L (ref 5–15)
ATRIAL RATE: 66 BPM
BLOOD GROUP ANTIBODIES SERPL: NORMAL
BUN SERPL-MCNC: 14 MG/DL (ref 6–20)
BUN/CREAT SERPL: 19 (ref 12–20)
CALCIUM SERPL-MCNC: 9.3 MG/DL (ref 8.5–10.1)
CALCULATED P AXIS, ECG09: 49 DEGREES
CALCULATED R AXIS, ECG10: -9 DEGREES
CALCULATED T AXIS, ECG11: -24 DEGREES
CHLORIDE SERPL-SCNC: 103 MMOL/L (ref 97–108)
CO2 SERPL-SCNC: 26 MMOL/L (ref 21–32)
CREAT SERPL-MCNC: 0.74 MG/DL (ref 0.55–1.02)
DIAGNOSIS, 93000: NORMAL
ERYTHROCYTE [DISTWIDTH] IN BLOOD BY AUTOMATED COUNT: 13.3 % (ref 11.5–14.5)
GLUCOSE SERPL-MCNC: 138 MG/DL (ref 65–100)
HCT VFR BLD AUTO: 42.5 % (ref 35–47)
HGB BLD-MCNC: 14 G/DL (ref 11.5–16)
MCH RBC QN AUTO: 29.4 PG (ref 26–34)
MCHC RBC AUTO-ENTMCNC: 32.9 G/DL (ref 30–36.5)
MCV RBC AUTO: 89.3 FL (ref 80–99)
NRBC # BLD: 0 K/UL (ref 0–0.01)
NRBC BLD-RTO: 0 PER 100 WBC
P-R INTERVAL, ECG05: 194 MS
PLATELET # BLD AUTO: 208 K/UL (ref 150–400)
PMV BLD AUTO: 9.6 FL (ref 8.9–12.9)
POTASSIUM SERPL-SCNC: 3.9 MMOL/L (ref 3.5–5.1)
Q-T INTERVAL, ECG07: 432 MS
QRS DURATION, ECG06: 66 MS
QTC CALCULATION (BEZET), ECG08: 452 MS
RBC # BLD AUTO: 4.76 M/UL (ref 3.8–5.2)
SODIUM SERPL-SCNC: 138 MMOL/L (ref 136–145)
SPECIMEN EXP DATE BLD: NORMAL
VENTRICULAR RATE, ECG03: 66 BPM
WBC # BLD AUTO: 4.3 K/UL (ref 3.6–11)

## 2021-07-03 PROCEDURE — 74011250637 HC RX REV CODE- 250/637: Performed by: NEUROLOGICAL SURGERY

## 2021-07-03 PROCEDURE — 99223 1ST HOSP IP/OBS HIGH 75: CPT | Performed by: INTERNAL MEDICINE

## 2021-07-03 PROCEDURE — 65660000000 HC RM CCU STEPDOWN

## 2021-07-03 PROCEDURE — 74011250637 HC RX REV CODE- 250/637: Performed by: FAMILY MEDICINE

## 2021-07-03 PROCEDURE — 74011250636 HC RX REV CODE- 250/636: Performed by: INTERNAL MEDICINE

## 2021-07-03 PROCEDURE — 80048 BASIC METABOLIC PNL TOTAL CA: CPT

## 2021-07-03 PROCEDURE — 71250 CT THORAX DX C-: CPT

## 2021-07-03 PROCEDURE — 74011250636 HC RX REV CODE- 250/636: Performed by: FAMILY MEDICINE

## 2021-07-03 PROCEDURE — 36415 COLL VENOUS BLD VENIPUNCTURE: CPT

## 2021-07-03 PROCEDURE — 85027 COMPLETE CBC AUTOMATED: CPT

## 2021-07-03 PROCEDURE — 74011250637 HC RX REV CODE- 250/637: Performed by: INTERNAL MEDICINE

## 2021-07-03 RX ORDER — PANTOPRAZOLE SODIUM 40 MG/1
40 TABLET, DELAYED RELEASE ORAL
Status: ON HOLD | COMMUNITY
End: 2021-07-16 | Stop reason: SDUPTHER

## 2021-07-03 RX ORDER — LEVETIRACETAM 500 MG/1
500 TABLET ORAL 2 TIMES DAILY
Status: DISCONTINUED | OUTPATIENT
Start: 2021-07-03 | End: 2021-07-07

## 2021-07-03 RX ORDER — LISINOPRIL 20 MG/1
40 TABLET ORAL DAILY
Status: DISCONTINUED | OUTPATIENT
Start: 2021-07-03 | End: 2021-07-16 | Stop reason: HOSPADM

## 2021-07-03 RX ORDER — DEXAMETHASONE SODIUM PHOSPHATE 4 MG/ML
4 INJECTION, SOLUTION INTRA-ARTICULAR; INTRALESIONAL; INTRAMUSCULAR; INTRAVENOUS; SOFT TISSUE EVERY 6 HOURS
Status: DISCONTINUED | OUTPATIENT
Start: 2021-07-03 | End: 2021-07-07 | Stop reason: ALTCHOICE

## 2021-07-03 RX ORDER — METOCLOPRAMIDE 10 MG/1
5 TABLET ORAL 2 TIMES DAILY
Status: DISCONTINUED | OUTPATIENT
Start: 2021-07-03 | End: 2021-07-03

## 2021-07-03 RX ORDER — DOCUSATE SODIUM 100 MG/1
100 CAPSULE, LIQUID FILLED ORAL DAILY
Status: DISCONTINUED | OUTPATIENT
Start: 2021-07-03 | End: 2021-07-07 | Stop reason: DRUGHIGH

## 2021-07-03 RX ORDER — SODIUM CHLORIDE 9 MG/ML
50 INJECTION, SOLUTION INTRAVENOUS CONTINUOUS
Status: DISCONTINUED | OUTPATIENT
Start: 2021-07-03 | End: 2021-07-07

## 2021-07-03 RX ORDER — METOCLOPRAMIDE 5 MG/1
5 TABLET ORAL 2 TIMES DAILY
Status: ON HOLD | COMMUNITY
End: 2021-07-16 | Stop reason: SDUPTHER

## 2021-07-03 RX ORDER — ONDANSETRON 2 MG/ML
4 INJECTION INTRAMUSCULAR; INTRAVENOUS
Status: DISCONTINUED | OUTPATIENT
Start: 2021-07-03 | End: 2021-07-07 | Stop reason: ALTCHOICE

## 2021-07-03 RX ORDER — METOCLOPRAMIDE 10 MG/1
5 TABLET ORAL 2 TIMES DAILY
Status: DISCONTINUED | OUTPATIENT
Start: 2021-07-03 | End: 2021-07-16 | Stop reason: HOSPADM

## 2021-07-03 RX ORDER — ATENOLOL 25 MG/1
25 TABLET ORAL DAILY
Status: ON HOLD | COMMUNITY
End: 2021-07-16 | Stop reason: SDUPTHER

## 2021-07-03 RX ORDER — LISINOPRIL 40 MG/1
20 TABLET ORAL DAILY
COMMUNITY
End: 2021-07-16

## 2021-07-03 RX ORDER — HYDRALAZINE HYDROCHLORIDE 20 MG/ML
10 INJECTION INTRAMUSCULAR; INTRAVENOUS
Status: DISCONTINUED | OUTPATIENT
Start: 2021-07-03 | End: 2021-07-07 | Stop reason: ALTCHOICE

## 2021-07-03 RX ADMIN — SODIUM CHLORIDE 75 ML/HR: 9 INJECTION, SOLUTION INTRAVENOUS at 12:38

## 2021-07-03 RX ADMIN — LISINOPRIL 40 MG: 20 TABLET ORAL at 09:20

## 2021-07-03 RX ADMIN — DEXAMETHASONE SODIUM PHOSPHATE 4 MG: 4 INJECTION, SOLUTION INTRAMUSCULAR; INTRAVENOUS at 18:04

## 2021-07-03 RX ADMIN — DEXAMETHASONE SODIUM PHOSPHATE 4 MG: 4 INJECTION, SOLUTION INTRAMUSCULAR; INTRAVENOUS at 11:00

## 2021-07-03 RX ADMIN — ONDANSETRON 4 MG: 2 INJECTION INTRAMUSCULAR; INTRAVENOUS at 10:35

## 2021-07-03 RX ADMIN — Medication 10 ML: at 23:11

## 2021-07-03 RX ADMIN — DOCUSATE SODIUM 100 MG: 100 CAPSULE, LIQUID FILLED ORAL at 09:21

## 2021-07-03 RX ADMIN — DEXAMETHASONE SODIUM PHOSPHATE 4 MG: 4 INJECTION, SOLUTION INTRAMUSCULAR; INTRAVENOUS at 23:11

## 2021-07-03 RX ADMIN — Medication 10 ML: at 14:46

## 2021-07-03 RX ADMIN — LEVETIRACETAM 500 MG: 500 TABLET ORAL at 18:04

## 2021-07-03 RX ADMIN — METOCLOPRAMIDE 5 MG: 10 TABLET ORAL at 18:04

## 2021-07-03 NOTE — ROUTINE PROCESS
TRANSFER - OUT REPORT:    Verbal report given to Allen Espinoza RN(name) on [employer]  being transferred to NSTU(unit) for routine progression of care       Report consisted of patients Situation, Background, Assessment and   Recommendations(SBAR). Information from the following report(s) SBAR, ED Summary, [facility] and Recent Results was reviewed with the receiving nurse. Lines:   Peripheral IV Anterior;Left;Proximal Forearm (Active)        Opportunity for questions and clarification was provided.       Patient transported with:   Nidia Christian RN

## 2021-07-03 NOTE — ROUTINE PROCESS
Bedside shift change report given to Rhys Siddiqi (oncoming nurse) by Van Rodriguez RN (offgoing nurse). Report included the following information SBAR, Kardex, ED Summary, Procedure Summary, Intake/Output, MAR, Recent Results, Cardiac Rhythm NSR, Quality Measures and Dual Neuro Assessment.

## 2021-07-03 NOTE — ED PROVIDER NOTES
80-year-old female presents from home accompanied by her daughter with complaint of new brain metastatic lesions. Patient had an MRI of her brain done 2 days ago and followed up with the oncologist today. MRI revealed multiple small hemorrhagic lesions concerning for metastatic melanoma. Patient was referred to the emergency department for further evaluation and to initiate treatment. Patient has no complaints at this time. She denies any headache, vomiting, weakness, visual changes. Daughter adds it she has been moving slowly over the past month or so but no other focal deficits. No other complaints at this time.            Past Medical History:   Diagnosis Date    Essential hypertension     Hypercholesterolemia     Menopause     Stroke [facility])        Past Surgical History:   Procedure Laterality Date    COLONOSCOPY N/A 2021    COLONOSCOPY performed by Akbar Posada MD at [facility] ENDOSCOPY    COLONOSCOPY,ARCHIE Arriola  2021         HX COLONOSCOPY      HX GYN       VI,Para V,SAB i    UPPER GI ENDOSCOPY,BIOPSY  2021              Family History:   Problem Relation Age of Onset    Parkinson's Disease Mother     Cancer Father         Gastric       Social History     Socioeconomic History    Marital status:      Spouse name: Not on file    Number of children: Not on file    Years of education: Not on file    Highest education level: Not on file   Occupational History    Not on file   Tobacco Use    Smoking status: Former Smoker     Packs/day: 0.25     Years: 25.00     Pack years: 6.25     Types: Cigarettes     Start date:      Quit date: 1990     Years since quittin.8    Smokeless tobacco: Never Used   Substance and Sexual Activity    Alcohol use: Not Currently     Comment: stopped May 2021    Drug use: Never    Sexual activity: Not on file   Other Topics Concern     Service No    Blood Transfusions No    Caffeine Concern No    Occupational Exposure No    Hobby Hazards No    Sleep Concern No    Stress Concern No    Weight Concern No    Special Diet No    Back Care Yes    Exercise Yes     Comment: normal daily activity     Bike Helmet Yes    Seat Belt Yes    Self-Exams Yes   Social History Narrative    Not on file     Social Determinants of Health     Financial Resource Strain:     Difficulty of Paying Living Expenses:    Food Insecurity:     Worried About Running Out of Food in the Last Year:     Ran Out of Food in the Last Year:    Transportation Needs:     Lack of Transportation (Medical):  Lack of Transportation (Non-Medical):    Physical Activity:     Days of Exercise per Week:     Minutes of Exercise per Session:    Stress:     Feeling of Stress :    Social Connections:     Frequency of Communication with Friends and Family:     Frequency of Social Gatherings with Friends and Family:     Attends Alevism Services:     Active Member of Clubs or Organizations:     Attends Club or Organization Meetings:     Marital Status:    Intimate Partner Violence:     Fear of Current or Ex-Partner:     Emotionally Abused:     Physically Abused:     Sexually Abused: ALLERGIES: Statins-hmg-coa reductase inhibitors    Review of Systems   Constitutional: Negative for fever. HENT: Negative for facial swelling. Eyes: Negative for visual disturbance. Respiratory: Negative for chest tightness. Cardiovascular: Negative for chest pain. Gastrointestinal: Negative for abdominal pain. Genitourinary: Negative for difficulty urinating and dysuria. Musculoskeletal: Negative for arthralgias. Skin: Negative for rash. Neurological: Negative for headaches. Hematological: Negative for adenopathy. Psychiatric/Behavioral: Negative for suicidal ideas.        Vitals:    07/02/21 2124 07/02/21 2215   BP: (!) 140/95 (!) 167/99   Pulse: 66 65   Resp: 16 18   Temp: 97.9 °F (36.6 °C)    SpO2: 96% 96% Physical Exam  Vitals and nursing note reviewed. Constitutional:       General: She is not in acute distress. Appearance: She is well-developed. HENT:      Head: Normocephalic and atraumatic. Eyes:      General: No scleral icterus. Conjunctiva/sclera: Conjunctivae normal.      Pupils: Pupils are equal, round, and reactive to light. Cardiovascular:      Rate and Rhythm: Normal rate. Heart sounds: No murmur heard. Pulmonary:      Effort: Pulmonary effort is normal. No respiratory distress. Abdominal:      General: There is no distension. Musculoskeletal:         General: Normal range of motion. Cervical back: Normal range of motion and neck supple. Skin:     General: Skin is warm and dry. Findings: No rash. Neurological:      Mental Status: She is alert and oriented to person, place, and time. [facility]     Perfect Serve Consult for Admission  10:38 PM    ED Room Number: ER15/15  Patient Name and age:  Ron Prakash 80 y.o.  female  Working Diagnosis:   1. Brain metastases ([address]        COVID-19 Suspicion:  no  Sepsis present:  no  Reassessment needed: no  Code Status:  Full Code  Readmission: no  Isolation Requirements:  no  Recommended Level of Care:  med/surg  Department:[facility] Adult ED - 21   Other:  New brain mets. Referred in by Dr. Karma Sales (oncology) to initiate biopsy and treatment.     Procedures

## 2021-07-03 NOTE — H&P
[address] [facility] Adult  Hospitalist Group  History and Physical    Primary Care Provider: Mally Bates NP  Date of Service:  2021    Subjective:     Suzi Oneal is a 80 y.o. female with a past medical history of hypertension, dyslipidemia, and prior stroke who presents at the request of Dr. Maryann Hoang for new hemorrhagic brain lesions concerning for metastatic process. In the ED, labs were pending at the time of my evaluation. MRI brain from 2021 showed multiple lesions present in the supratentorial, and infratentorial hemorrhagic metastasis with no midline shift or herniation. Review of Systems:    All other review of systems were negative except for that written in the History of Present Illness. Past Medical History:   Diagnosis Date    Essential hypertension     Hypercholesterolemia     Menopause     [facility]roke [facility])       Past Surgical History:   Procedure Laterality Date    COLONOSCOPY N/A 2021    COLONOSCOPY performed by Harry Mckinney MD at [address] [place] [address]  2021         HX COLONOSCOPY      HX GYN       VI,Para V,SAB i    UPPER GI ENDOSCOPY,BIOPSY  2021          Prior to Admission medications    Medication Sig Start Date End Date Taking? Authorizing Provider   docusate sodium (DULCOLAX STOOL SOFTENER, DSS, PO) Take 1 Capsule by mouth two (2) times a day. Provider, Historical   ondansetron hcl (Zofran) 8 mg tablet Take 8 mg by mouth every twelve (12) hours as needed for Nausea or Vomiting. Provider, Historical   clopidogreL (Plavix) 75 mg tab Take 75 mg by mouth daily. Provider, Historical   atorvastatin (LIPITOR) 20 mg tablet TAKE 1 TABLET NIGHTLY  Patient not taking: Reported on 2021   Mally Bates NP   atenolol (TENORMIN) 25 mg tablet TAKE ONE TABLET BY 2 times a DAY  Patient taking differently: Take 25 mg by mouth daily.  TAKE ONE TABLET BY 2 times a DAY 19   Dinesh HEIDE Reynoso   glucosamine-chondroitin ([facility]) 500-400 mg cap Take 1 Cap by mouth daily. Patient not taking: Reported on 6/17/2021    Provider, Historical   ibuprofen (MOTRIN) 400 mg tablet Take 1 Tab by mouth every eight (8) hours as needed for Pain. Patient not taking: Reported on 6/17/2021 12/20/18   Peyton Winn NP   lisinopril (PRINIVIL, ZESTRIL) 40 mg tablet Take 1 Tab by mouth daily. Bedtime. 11/15/18   Peyton Winn NP   fish oil-omega-3 fatty acids (FISH OIL) 340-1,000 mg capsule Take 1 Cap by mouth daily. Patient not taking: Reported on 7/2/2021    Provider, Historical   cyanocobalamin (VITAMIN B-12) 1,000 mcg tablet Take 1,000 mcg by mouth daily. Patient not taking: Reported on 7/2/2021    Provider, Historical   glucosamine 1,000 mg tab Take  by mouth daily. Patient not taking: Reported on 6/17/2021    Provider, Historical   cholecalciferol, vitamin D3, (VITAMIN D3) 2,000 unit tab Take  by mouth. Patient not taking: Reported on 7/2/2021    Provider, Historical   ascorbic acid, vitamin C, (VITAMIN C) 500 mg tablet Take  by mouth. Patient not taking: Reported on 7/2/2021    Provider, Historical   multivitamin (ONE A DAY) tablet Take 1 Tab by mouth daily. Patient not taking: Reported on 7/2/2021    Provider, Historical   aspirin 81 mg tablet Take 81 mg by mouth daily. Patient not taking: Reported on 7/2/2021    Provider, Historical     Allergies   Allergen Reactions    Statins-Hmg-Coa Reductase Inhibitors Other (comments)     Other reaction(s): Other (comments)  Severs leg pain(Zetia, Crestor)  Severs leg pain(Zetia, Crestor)      Family History   Problem Relation Age of Onset    Parkinson's Disease Mother     Cancer Father         Gastric      SOCIAL HISTORY:  Patient resides at Home.   Patient ambulates independently  Smoking history: none  Alcohol history:none      Objective:       Physical Exam:   Visit Vitals  BP (!) 163/98   Pulse 65   Temp 97.9 °F (36.6 °C)   Resp 18   SpO2 96%     General:  Alert, cooperative, no distress, appears stated age. Head:  Normocephalic, without obvious abnormality, atraumatic. Eyes:  Conjunctivae/corneas clear. EOMs intact. .   Ears:  Normal TMs and external ear canals both ears. Nose: Nares normal. Septum midline. .   Throat: Lips, mucosa, and tongue normal.    Neck: Supple, symmetrical, trachea midline   Back:   Symmetric, no curvature. ROM normal.    Lungs:   Clear to auscultation bilaterally. Chest wall:  No tenderness or deformity. Heart:  Regular rate and rhythm, S1, S2 normal, no murmur, click, rub or gallop. Abdomen:   Soft, non-tender. Bowel sounds normal. No masses,  No organomegaly. Extremities: Extremities normal, atraumatic, no cyanosis or edema. Pulses: 2+ and symmetric all extremities. Skin: Skin color, texture, turgor normal. No rashes or lesions. Neuro: Cranial nerves III through X grossly intact, 5 out of 5 upper extremity strength bilaterally. Pronator drift. 3 out of 5. Upper and lower sensation intact. ECG: Normal sinus rhythm 66, nonspecific ST-T wave changes    Data Review: All diagnostic labs and studies have been reviewed. Assessment:     Active Problems:    * No active hospital problems.  *      Plan:     #Brain lesions with hemorrhage  -Patient sent by Dr. Sharifa Ortiz, as an outpatient and referred patient to [facility] for evaluation of hemorrhagic lesions seen on brain MRI, that are concerning for metastatic cancer.  -Patient currently alert and oriented x4 with no focal defects  -Decadron  -Consult Dr. Sharifa Ortiz, appreciate rec's    #Hypertension  -continue home lisinopril  -prn hydralazine  -needs med rec    #Dyslipidemia  #hx CVA  -residual LUE paresthesia   -needs med rec  -continue statin    FEN: cardiac  dvt ppx: SCD  MPOA: Trygve Crooks (son)  Code: Full    Signed By: Rolando Wilcox MD     July 2, 2021

## 2021-07-03 NOTE — PROGRESS NOTES
Reason for Visit:   Nilsa Eaton is a 80 y.o. female who is seen in hospital for new brain metastasis. Treatment History:   None from us yet    History of Present Illness:     Pt seen today in hospital for new brain mets. No known cancer. No tissue dx. Pt sent to [facility] from [facility] by Dr Joel Dailey oncology. Had CT A/P that showed constipation. Had PET ordered but not done yet. Pt is in bed in ER. States beeping machines are driving her crazy. Pt little confused at my visit. Not sure why she is here. States has some balance problems with walking. States lives in a community. Has 3 sons. No family at bedside. For admission and ogden. No fevers/ chills/ chest pain/ SOB/ nausea/ vomiting/diarrhea/ pain/      Note yesterday with Dr Joel Dailey at [facility] oncology:  Here for above, problems with constipation/n/v for several months. Upper and lower endoscopies were unrevealing. MRI brain showing multiple mets--possible melanoma. Family wants to be aggressive. Patient denies specific complaints.     Past Medical History:   Diagnosis Date    Essential hypertension     Hypercholesterolemia     Menopause     Stroke [facility])       Past Surgical History:   Procedure Laterality Date    COLONOSCOPY N/A 2021    COLONOSCOPY performed by Dave Mijares MD at [address] [place] [address]  2021         HX COLONOSCOPY      HX GYN       VI,Para V,SAB i    UPPER GI ENDOSCOPY,BIOPSY  2021           Social History     Tobacco Use    Smoking status: Former Smoker     Packs/day: 0.25     Years: 25.00     Pack years: 6.25     Types: Cigarettes     Start date:      Quit date: 1990     Years since quittin.8    Smokeless tobacco: Never Used   Substance Use Topics    Alcohol use: Not Currently     Comment: stopped May 2021      Family History   Problem Relation Age of Onset    Parkinson's Disease Mother     Cancer Father         Gastric     Current Facility-Administered Medications   Medication Dose Route Frequency    lisinopriL (PRINIVIL, ZESTRIL) tablet 40 mg  40 mg Oral DAILY    ondansetron (ZOFRAN) injection 4 mg  4 mg IntraVENous Q4H PRN    docusate sodium (COLACE) capsule 100 mg  100 mg Oral DAILY    dexamethasone (DECADRON) 4 mg/mL injection 4 mg  4 mg IntraVENous Q6H    hydrALAZINE (APRESOLINE) 20 mg/mL injection 10 mg  10 mg IntraVENous Q6H PRN    0.9% sodium chloride infusion  75 mL/hr IntraVENous CONTINUOUS    sodium chloride (NS) flush 5-40 mL  5-40 mL IntraVENous Q8H    sodium chloride (NS) flush 5-40 mL  5-40 mL IntraVENous PRN    acetaminophen (TYLENOL) tablet 650 mg  650 mg Oral Q6H PRN    Or    acetaminophen (TYLENOL) suppository 650 mg  650 mg Rectal Q6H PRN     Current Outpatient Medications   Medication Sig    docusate sodium (DULCOLAX STOOL SOFTENER, DSS, PO) Take 1 Capsule by mouth two (2) times a day.  ondansetron hcl (Zofran) 8 mg tablet Take 8 mg by mouth every twelve (12) hours as needed for Nausea or Vomiting.  clopidogreL (Plavix) 75 mg tab Take 75 mg by mouth daily.  atorvastatin (LIPITOR) 20 mg tablet TAKE 1 TABLET NIGHTLY (Patient not taking: Reported on 7/2/2021)    atenolol (TENORMIN) 25 mg tablet TAKE ONE TABLET BY 2 times a DAY (Patient taking differently: Take 25 mg by mouth daily. TAKE ONE TABLET BY 2 times a DAY)    glucosamine-chondroitin (ARTHX) 500-400 mg cap Take 1 Cap by mouth daily. (Patient not taking: Reported on 6/17/2021)    ibuprofen (MOTRIN) 400 mg tablet Take 1 Tab by mouth every eight (8) hours as needed for Pain. (Patient not taking: Reported on 6/17/2021)    lisinopril (PRINIVIL, ZESTRIL) 40 mg tablet Take 1 Tab by mouth daily. Bedtime.  fish oil-omega-3 fatty acids (FISH OIL) 340-1,000 mg capsule Take 1 Cap by mouth daily. (Patient not taking: Reported on 7/2/2021)    cyanocobalamin (VITAMIN B-12) 1,000 mcg tablet Take 1,000 mcg by mouth daily.  (Patient not taking: Reported on 7/2/2021)    glucosamine 1,000 mg tab Take  by mouth daily. (Patient not taking: Reported on 6/17/2021)    cholecalciferol, vitamin D3, (VITAMIN D3) 2,000 unit tab Take  by mouth. (Patient not taking: Reported on 7/2/2021)    ascorbic acid, vitamin C, (VITAMIN C) 500 mg tablet Take  by mouth. (Patient not taking: Reported on 7/2/2021)    multivitamin (ONE A DAY) tablet Take 1 Tab by mouth daily. (Patient not taking: Reported on 7/2/2021)    aspirin 81 mg tablet Take 81 mg by mouth daily. (Patient not taking: Reported on 7/2/2021)      Allergies   Allergen Reactions    Statins-Hmg-Coa Reductase Inhibitors Other (comments)     Other reaction(s): Other (comments)  Severs leg pain(Zetia, Crestor)  Severs leg pain(Zetia, Crestor)        Review of Systems: A complete review of systems was obtained, negative except as described above. Physical Exam:     Visit Vitals  BP (!) 129/91   Pulse 79   Temp 98.1 °F (36.7 °C)   Resp 18   SpO2 95%     ECOG PS: 2  General: No distress  Eyes: PERRLA, anicteric sclerae  HENT: Atraumatic  Neck: Supple  Respiratory: CTAB, normal respiratory effort  CV: Normal rate, regular rhythm  GI: Soft, nontender, nondistended  MS: in bed, states has hard time walking, balance issues  Skin: No rashes, ecchymoses, or petechiae. Normal temperature, turgor, and texture. Psych: Awake, conversant    Results:     Lab Results   Component Value Date/Time    WBC 4.3 07/03/2021 03:09 AM    HGB 14.0 07/03/2021 03:09 AM    HCT 42.5 07/03/2021 03:09 AM    PLATELET 639 10/37/6949 03:09 AM    MCV 89.3 07/03/2021 03:09 AM    ABS.  NEUTROPHILS 3.0 05/24/2021 11:30 AM     Lab Results   Component Value Date/Time    Sodium 138 07/03/2021 03:09 AM    Potassium 3.9 07/03/2021 03:09 AM    Chloride 103 07/03/2021 03:09 AM    CO2 26 07/03/2021 03:09 AM    Glucose 138 (H) 07/03/2021 03:09 AM    BUN 14 07/03/2021 03:09 AM    Creatinine 0.74 07/03/2021 03:09 AM    GFR est AA >60 07/03/2021 03:09 AM    GFR est non-AA >60 07/03/2021 03:09 AM    Calcium 9.3 07/03/2021 03:09 AM     Lab Results   Component Value Date/Time    Bilirubin, total 0.5 07/02/2021 10:48 PM    ALT (SGPT) 33 07/02/2021 10:48 PM    Alk. phosphatase 102 07/02/2021 10:48 PM    Protein, total 7.3 07/02/2021 10:48 PM    Albumin 3.6 07/02/2021 10:48 PM    Globulin 3.7 07/02/2021 10:48 PM     CT A/P 5/27/2021  IMPRESSION  No acute intraperitoneal process is identified. Please see above for additional nonemergent incidental findings. MRI brain 6/30/2021  IMPRESSION  1. Multiple (at least 15) supratentorial and infratentorial hemorrhagic  metastases, with some of the largest examples as above. Overall no midline shift  or herniation. MRI brain without contrast 6/30/2021  IMPRESSION  1. Multiple T1 hyperintense lesions in the cerebellum and cerebral hemispheres. Associated edema and relatively mild mass effect. Appearance is suggestive of  multiple metastasis from melanoma. Multiple subacute hemorrhages are less  likely. 2. Chronic age-related volume loss and white matter disease noted. 3. MRI technologists will have patient taken an be seen in the ED as this was  not unexpected finding. Assessment/PLAN:   1) Brain Metastasis on MRI  Sent here from [facility] oncology. Note from Dr Ryan Garcia yesterday:  Long discussion with patient, daughter and son--will get PET and Biopsy. Discussed therapies briefly. Immunotherapy is mainstay of treatment for melanoma but need to know about specific mutations before beginning systemic treatment. The CNS disease is symptomatic--would start dexamethasone 8mg bid--spoke with Anthony Mcfarland and they will start at [facility]. Will discuss tissue dx and biopsy with Dr Leah Young. Pt seen in ER. Being admitted. Records reviewed. No primary site of cancer yet. States has had lots of vomiting at home. Started on decadron. Neurosurgery eval.   Will need Rad/onc eval.    PET ordered but not done.   Had CT A/P that was negative except for constipation. Would do CT chest while here to r/o primary site of cancer. Will need biopsy of easiest site. Would consult palliative care for goals of care. No family at bedside at my visit. Per oncology discussion yesterday, pt wants eval and possible treatment options. 2) N/V. Supportive meds. Decadron. 3) constipation. Bowel regimen. 4) psychosocial.  Mood agitated at my visit. Lives near \[facility] [place] [facility]\"". Has supportive children.    Palliative care eval.     We will follow  Call if questions    Signed By: Leeanne Urrutia, DO

## 2021-07-03 NOTE — PROGRESS NOTES
[facility]  Hospitalist Group                                                                                          Hospitalist Progress Note  David Traylor MD  Answering service: [phone] from in house phone        Date of Service:  7/3/2021  NAME:  Joe De Jesus  :  1932  MRN:  [id number]      Admission Summary:   As per HPI : Joe De Jesus is a 80 y.o. female with a past medical history of hypertension, dyslipidemia, and prior stroke who presents at the request of Dr. Terra Horne for new hemorrhagic brain lesions concerning for metastatic process.     In the ED, labs were pending at the time of my evaluation. MRI brain from 2021 showed multiple lesions present in the supratentorial, and infratentorial hemorrhagic metastasis with no midline shift or herniation. Interval history / Subjective:   No events overnight, patient was seen and examined today, she was awake and alert, following commands, reported no headache, reported trouble maintaining balance, denies any nausea or vomiting. Assessment & Plan:     #Brain lesions with hemorrhage  -Patient sent by Dr. Terra Horne, as an outpatient and referred patient to [facility] for evaluation of hemorrhagic lesions seen on brain MRI, that are concerning for metastatic cancer.  - continue Decardron, continue Keppra  - Recent CT abd/pevlis shows not primary, ordered CT chest, Onco on board, possible radio +/- chemo.        #Hypertension  BP stable, continue home meds     #Dyslipidemia  #hx CVA  -residual LUE paresthesia   - Plavix on hold   -continue statin     FEN: cardiac  dvt ppx: SCD  MPOA: Jairo Giovana (son)  Code: Full     Hospital Problems  Date Reviewed: 2021        Codes Class Noted POA    Brain mass ICD-10-CM: G93.89  ICD-9-CM: 348.89  2021 Unknown                Review of Systems:   Pertinent items are noted in HPI. Vital Signs:    Last 24hrs VS reviewed since prior progress note.  Most recent are:  Visit Vitals  BP (!) 169/103 (BP 1 Location: Right arm, BP Patient Position: At rest)   Pulse 72   Temp 97.6 °F (36.4 °C)   Resp 16   SpO2 96%       No intake or output data in the 24 hours ending 07/03/21 1651     Physical Examination:     I had a face to face encounter with this patient and independently examined them on 7/3/2021 as outlined below:          Constitutional:  No acute distress, cooperative, pleasant    ENT:  Oral mucosa moist, oropharynx benign. Resp:  CTA bilaterally. No wheezing/rhonchi/rales. No accessory muscle use   CV:  Regular rhythm, normal rate, no murmurs, gallops, rubs    GI:  Soft, non distended, non tender. normoactive bowel sounds, no hepatosplenomegaly     Musculoskeletal:  No edema, warm, 2+ pulses throughout    Neurologic:  Moves all extremities. AAOx3, CN II-XII reviewed            Data Review:    Review and/or order of clinical lab test      Labs:     Recent Labs     07/03/21  0309   WBC 4.3   HGB 14.0   HCT 42.5        Recent Labs     07/03/21  0309 07/02/21  2248    137   K 3.9 3.8    102   CO2 26 28   BUN 14 15   CREA 0.74 0.81   * 124*   CA 9.3 9.7     Recent Labs     07/02/21  2248   ALT 33      TBILI 0.5   TP 7.3   ALB 3.6   GLOB 3.7     Recent Labs     07/02/21  2248   INR 1.0   PTP 10.8      No results for input(s): FE, TIBC, PSAT, FERR in the last 72 hours. No results found for: FOL, RBCF   No results for input(s): PH, PCO2, PO2 in the last 72 hours. No results for input(s): CPK, CKNDX, TROIQ in the last 72 hours.     No lab exists for component: CPKMB  Lab Results   Component Value Date/Time    Cholesterol, total 163 02/14/2019 12:00 AM    HDL Cholesterol 57 02/14/2019 12:00 AM    LDL, calculated 88 02/14/2019 12:00 AM    Triglyceride 92 02/14/2019 12:00 AM     No results found for: [facility]  Lab Results   Component Value Date/Time    Color YELLOW/STRAW 05/24/2021 11:30 AM    Appearance CLEAR 05/24/2021 11:30 AM    Specific gravity 1.018 05/24/2021 11:30 AM    Specific gravity 1.020 06/03/2017 06:45 PM    pH (UA) 6.0 05/24/2021 11:30 AM    Protein 100 (A) 05/24/2021 11:30 AM    Glucose Negative 05/24/2021 11:30 AM    Ketone Negative 05/24/2021 11:30 AM    Bilirubin NEGATIVE  06/03/2017 06:45 PM    Urobilinogen 0.2 05/24/2021 11:30 AM    Nitrites Negative 05/24/2021 11:30 AM    Leukocyte Esterase MODERATE (A) 05/24/2021 11:30 AM    Epithelial cells MODERATE (A) 05/24/2021 11:30 AM    Bacteria 1+ (A) 05/24/2021 11:30 AM    WBC 5-10 05/24/2021 11:30 AM    RBC 0-5 05/24/2021 11:30 AM         Medications Reviewed:     Current Facility-Administered Medications   Medication Dose Route Frequency    lisinopriL (PRINIVIL, ZESTRIL) tablet 40 mg  40 mg Oral DAILY    ondansetron (ZOFRAN) injection 4 mg  4 mg IntraVENous Q4H PRN    docusate sodium (COLACE) capsule 100 mg  100 mg Oral DAILY    dexamethasone (DECADRON) 4 mg/mL injection 4 mg  4 mg IntraVENous Q6H    hydrALAZINE (APRESOLINE) 20 mg/mL injection 10 mg  10 mg IntraVENous Q6H PRN    0.9% sodium chloride infusion  75 mL/hr IntraVENous CONTINUOUS    levETIRAcetam (KEPPRA) tablet 500 mg  500 mg Oral BID    sodium chloride (NS) flush 5-40 mL  5-40 mL IntraVENous Q8H    sodium chloride (NS) flush 5-40 mL  5-40 mL IntraVENous PRN    acetaminophen (TYLENOL) tablet 650 mg  650 mg Oral Q6H PRN    Or    acetaminophen (TYLENOL) suppository 650 mg  650 mg Rectal Q6H PRN     ______________________________________________________________________  EXPECTED LENGTH OF STAY: - - -  ACTUAL LENGTH OF STAY:          1                 David Traylor MD

## 2021-07-03 NOTE — ED TRIAGE NOTES
Per patient's daughter, patient had an abnormal MRI of her head which showed growing masses. No pain no nausea or vomiting.

## 2021-07-03 NOTE — PROGRESS NOTES
Pharmacist Admission Medication Reconciliation:    Information obtained from: This medication history was obtained from the patient's son Edson Parks) by phone. RxQuery data available¹:  NO    Summary:     Of note, the patient has two sons that are physicians and a daughter that is a pharmacist.  They recently reduced their mother's doses of atenolol and lisinopril due to low blood pressures that started with poor PO intake. Medications added: pantoprazole, metoclopramide  Medications deleted: aspirin, ibuprofen  Dose changes: NA    Active and held inpatient orders were reviewed and no changes are needed. ¹RxQuery pharmacy benefit data reflects medications processed through the patient's insurance, however this data does NOT capture whether the medication is currently being taken by the patient. Allergies:  Statins-hmg-coa reductase inhibitors    Significant PMH/Disease States:   Past Medical History:   Diagnosis Date    Essential hypertension     Hypercholesterolemia     Menopause     Stroke [facility]) 2011     Chief Complaint for this Admission:    Chief Complaint   Patient presents with    Abnormal MRI     Prior to Admission Medications:   Prior to Admission Medications   Prescriptions Last Dose Informant Patient Reported? Taking?   atenoloL (TENORMIN) 25 mg tablet   Yes Yes   Sig: Take 25 mg by mouth daily. atorvastatin (LIPITOR) 20 mg tablet   No Yes   Sig: TAKE 1 TABLET NIGHTLY   clopidogreL (Plavix) 75 mg tab   Yes Yes   Sig: Take 75 mg by mouth daily. lisinopriL (PRINIVIL, ZESTRIL) 40 mg tablet   Yes Yes   Sig: Take 20 mg by mouth daily. metoclopramide HCl (REGLAN) 5 mg tablet   Yes Yes   Sig: Take 5 mg by mouth two (2) times a day. multivitamin (ONE A DAY) tablet   Yes Yes   Sig: Take 1 Tablet by mouth daily. ondansetron hcl (Zofran) 4 mg tablet   Yes Yes   Sig: Take 8 mg by mouth every eight (8) hours as needed for Nausea or Vomiting.    pantoprazole (PROTONIX) 40 mg tablet   Yes Yes Sig: Take 40 mg by mouth Daily (before breakfast). Facility-Administered Medications: None     Thank you for allowing me to participate in this patient's care. Please call  or  with any questions. Shun Kendall, Pharm. D., BCPS, BCPPS

## 2021-07-03 NOTE — PROGRESS NOTES
Consult received   Multiple intracerebral and cerebellar tumors consistent with  Metastatic disease  Not really an operative candidate give number of lesions  Recommend Rad onc consult if she wishes to treat at all  Will follow as needed  Thank you

## 2021-07-03 NOTE — PROGRESS NOTES
Problem: Falls - Risk of  Goal: *Absence of Falls  Description: Document Janneth Medrano Fall Risk and appropriate interventions in the flowsheet.   Outcome: Progressing Towards Goal  Note: Fall Risk Interventions:  Mobility Interventions: Patient to call before getting OOB              Elimination Interventions: Call light in reach              Problem: Patient Education: Go to Patient Education Activity  Goal: Patient/Family Education  Outcome: Progressing Towards Goal     Problem: Patient Education:  Go to Education Activity  Goal: Patient/Family Education  Outcome: Progressing Towards Goal     Problem: Brain Tumor Day 1  Goal: Activity/Safety  Outcome: Progressing Towards Goal  Goal: Consults, if ordered  Outcome: Progressing Towards Goal  Goal: Diagnostic Test/Procedures  Outcome: Progressing Towards Goal  Goal: Nutrition/Diet  Outcome: Progressing Towards Goal  Goal: Discharge Planning  Outcome: Progressing Towards Goal  Goal: Medications  Outcome: Progressing Towards Goal  Goal: Respiratory  Outcome: Progressing Towards Goal  Goal: Treatments/Interventions/Procedures  Outcome: Progressing Towards Goal  Goal: *Neurologic stability  Outcome: Progressing Towards Goal  Goal: *Progress independence mobility/activities (eg: Mobility precautions)  Outcome: Progressing Towards Goal  Goal: *Adequate oxygenation  Outcome: Progressing Towards Goal  Goal: *Hemodynamically stable without vasoactive medications  Outcome: Progressing Towards Goal     Problem: Brain Tumor Day 2  Goal: Activity/Safety  Outcome: Progressing Towards Goal  Goal: Consults, if ordered  Outcome: Progressing Towards Goal  Goal: Diagnostic Test/Procedures  Outcome: Progressing Towards Goal  Goal: Nutrition/Diet  Outcome: Progressing Towards Goal  Goal: Medications  Outcome: Progressing Towards Goal  Goal: Respiratory  Outcome: Progressing Towards Goal  Goal: Treatments/Interventions/Procedures  Outcome: Progressing Towards Goal  Goal: *Hemodynamically stable without vasoactive medications  Outcome: Progressing Towards Goal  Goal: *Neurologic stability  Outcome: Progressing Towards Goal  Goal: *Progress independence mobility/activities (eg: Mobility precautions)  Outcome: Progressing Towards Goal  Goal: *Adequate oxygenation  Outcome: Progressing Towards Goal  Goal: *Tolerating diet  Outcome: Progressing Towards Goal     Problem: Brain Tumor Day 3 to Discharge  Goal: Activity/Safety  Outcome: Progressing Towards Goal  Goal: Nutrition/Diet  Outcome: Progressing Towards Goal  Goal: Discharge Planning  Outcome: Progressing Towards Goal  Goal: Medications  Outcome: Progressing Towards Goal  Goal: Treatments/Interventions/Procedures  Outcome: Progressing Towards Goal  Goal: Progressive Mobility and Function  Outcome: Progressing Towards Goal  Goal: Psychosocial  Outcome: Progressing Towards Goal     Problem: Brain Tumor Discharge Outcomes  Goal: *Neurologic stability  Outcome: Progressing Towards Goal  Goal: *Progress independence mobility/activities (eg: Mobility precautions)  Outcome: Progressing Towards Goal  Goal: *Adequate oxygenation  Outcome: Progressing Towards Goal  Goal: *Tolerates nutrition therapy  Outcome: Progressing Towards Goal  Goal: *Verbalizes understanding and describes medication purposes and frequencies  Outcome: Progressing Towards Goal  Goal: *Verbalizes understanding of type and use of pain medication  Outcome: Progressing Towards Goal  Goal: *Describes home care/support arrangements established based on need  Outcome: Progressing Towards Goal  Goal: *Describes available resources and support systems  Outcome: Progressing Towards Goal

## 2021-07-04 LAB
ANION GAP SERPL CALC-SCNC: 6 MMOL/L (ref 5–15)
BUN SERPL-MCNC: 17 MG/DL (ref 6–20)
BUN/CREAT SERPL: 25 (ref 12–20)
CALCIUM SERPL-MCNC: 9.6 MG/DL (ref 8.5–10.1)
CHLORIDE SERPL-SCNC: 107 MMOL/L (ref 97–108)
CO2 SERPL-SCNC: 25 MMOL/L (ref 21–32)
CREAT SERPL-MCNC: 0.67 MG/DL (ref 0.55–1.02)
ERYTHROCYTE [DISTWIDTH] IN BLOOD BY AUTOMATED COUNT: 13.8 % (ref 11.5–14.5)
GLUCOSE SERPL-MCNC: 138 MG/DL (ref 65–100)
HCT VFR BLD AUTO: 44.4 % (ref 35–47)
HGB BLD-MCNC: 14.8 G/DL (ref 11.5–16)
MCH RBC QN AUTO: 29.3 PG (ref 26–34)
MCHC RBC AUTO-ENTMCNC: 33.3 G/DL (ref 30–36.5)
MCV RBC AUTO: 87.9 FL (ref 80–99)
NRBC # BLD: 0 K/UL (ref 0–0.01)
NRBC BLD-RTO: 0 PER 100 WBC
PLATELET # BLD AUTO: 223 K/UL (ref 150–400)
PMV BLD AUTO: 9.8 FL (ref 8.9–12.9)
POTASSIUM SERPL-SCNC: 3.9 MMOL/L (ref 3.5–5.1)
RBC # BLD AUTO: 5.05 M/UL (ref 3.8–5.2)
SODIUM SERPL-SCNC: 138 MMOL/L (ref 136–145)
WBC # BLD AUTO: 6.7 K/UL (ref 3.6–11)

## 2021-07-04 PROCEDURE — 74011250637 HC RX REV CODE- 250/637: Performed by: NEUROLOGICAL SURGERY

## 2021-07-04 PROCEDURE — 74011250636 HC RX REV CODE- 250/636: Performed by: FAMILY MEDICINE

## 2021-07-04 PROCEDURE — 74011250637 HC RX REV CODE- 250/637: Performed by: INTERNAL MEDICINE

## 2021-07-04 PROCEDURE — 36415 COLL VENOUS BLD VENIPUNCTURE: CPT

## 2021-07-04 PROCEDURE — 74011250636 HC RX REV CODE- 250/636: Performed by: INTERNAL MEDICINE

## 2021-07-04 PROCEDURE — 80048 BASIC METABOLIC PNL TOTAL CA: CPT

## 2021-07-04 PROCEDURE — 51798 US URINE CAPACITY MEASURE: CPT

## 2021-07-04 PROCEDURE — 99232 SBSQ HOSP IP/OBS MODERATE 35: CPT | Performed by: INTERNAL MEDICINE

## 2021-07-04 PROCEDURE — 85027 COMPLETE CBC AUTOMATED: CPT

## 2021-07-04 PROCEDURE — 74011250637 HC RX REV CODE- 250/637: Performed by: FAMILY MEDICINE

## 2021-07-04 PROCEDURE — 65660000000 HC RM CCU STEPDOWN

## 2021-07-04 RX ADMIN — METOCLOPRAMIDE 5 MG: 10 TABLET ORAL at 17:35

## 2021-07-04 RX ADMIN — METOCLOPRAMIDE 5 MG: 10 TABLET ORAL at 10:10

## 2021-07-04 RX ADMIN — LEVETIRACETAM 500 MG: 500 TABLET ORAL at 17:36

## 2021-07-04 RX ADMIN — SODIUM CHLORIDE 75 ML/HR: 9 INJECTION, SOLUTION INTRAVENOUS at 06:22

## 2021-07-04 RX ADMIN — DEXAMETHASONE SODIUM PHOSPHATE 4 MG: 4 INJECTION, SOLUTION INTRAMUSCULAR; INTRAVENOUS at 06:22

## 2021-07-04 RX ADMIN — Medication 10 ML: at 06:22

## 2021-07-04 RX ADMIN — LISINOPRIL 40 MG: 20 TABLET ORAL at 10:10

## 2021-07-04 RX ADMIN — DEXAMETHASONE SODIUM PHOSPHATE 4 MG: 4 INJECTION, SOLUTION INTRAMUSCULAR; INTRAVENOUS at 12:41

## 2021-07-04 RX ADMIN — LEVETIRACETAM 500 MG: 500 TABLET ORAL at 10:10

## 2021-07-04 RX ADMIN — Medication 10 ML: at 12:42

## 2021-07-04 RX ADMIN — DOCUSATE SODIUM 100 MG: 100 CAPSULE, LIQUID FILLED ORAL at 10:10

## 2021-07-04 RX ADMIN — DEXAMETHASONE SODIUM PHOSPHATE 4 MG: 4 INJECTION, SOLUTION INTRAMUSCULAR; INTRAVENOUS at 17:36

## 2021-07-04 RX ADMIN — Medication 10 ML: at 22:08

## 2021-07-04 NOTE — PROGRESS NOTES
Reason for Visit:   Agnes Ames is a 80 y.o. female who is seen in hospital for fu new brain metastasis. Treatment History:   None from us yet    History of Present Illness:     Pt seen today in hospital for new brain mets. No known cancer. No tissue dx. Pt sent to [facility] from \[facility]\"" by Dr Redd Mays oncology. Had CT A/P that showed constipation. Had PET ordered but not done yet. Pt is in bed in ER. States beeping machines are driving her crazy. Pt little confused at my visit. Not sure why she is here. States has some balance problems with walking. States lives in a community. Has 3 sons. No family at bedside. For admission and ogden. No fevers/ chills/ chest pain/ SOB/ nausea/ vomiting/diarrhea/ pain/    Note yesterday with Dr Redd Mays at \[facility]\"" oncology:  Here for above, problems with constipation/n/v for several months. Upper and lower endoscopies were unrevealing. MRI brain showing multiple mets--possible melanoma. Family wants to be aggressive. Patient denies specific complaints. INTERIM HX:  Pt seen today for fu new brain mets/ no tissue dx of cancer. Pt is in bed. Nursing at bedside. Some confusion. Comfortable. No pain.    No fevers/ chills/ chest pain/ SOB/ nausea/ vomiting/diarrhea/ pain/      Past Medical History:   Diagnosis Date    Essential hypertension     Hypercholesterolemia     Menopause     Stroke [facility])       Past Surgical History:   Procedure Laterality Date    COLONOSCOPY N/A 2021    COLONOSCOPY performed by Juan C Bui MD at [address] [place] [address]  2021         HX COLONOSCOPY  2010    HX GYN       VI,Para V,SAB i    UPPER GI ENDOSCOPY,BIOPSY  2021           Social History     Tobacco Use    Smoking status: Former Smoker     Packs/day: 0.25     Years: 25.00     Pack years: 6.25     Types: Cigarettes     Start date:      Quit date: 1990     Years since quittin.8    Smokeless tobacco: Never Used   Substance Use Topics    Alcohol use: Not Currently     Comment: stopped May 2021      Family History   Problem Relation Age of Onset    Parkinson's Disease Mother     Cancer Father         Gastric     Current Facility-Administered Medications   Medication Dose Route Frequency    lisinopriL (PRINIVIL, ZESTRIL) tablet 40 mg  40 mg Oral DAILY    ondansetron (ZOFRAN) injection 4 mg  4 mg IntraVENous Q4H PRN    docusate sodium (COLACE) capsule 100 mg  100 mg Oral DAILY    dexamethasone (DECADRON) 4 mg/mL injection 4 mg  4 mg IntraVENous Q6H    hydrALAZINE (APRESOLINE) 20 mg/mL injection 10 mg  10 mg IntraVENous Q6H PRN    0.9% sodium chloride infusion  75 mL/hr IntraVENous CONTINUOUS    levETIRAcetam (KEPPRA) tablet 500 mg  500 mg Oral BID    metoclopramide HCl (REGLAN) tablet 5 mg  5 mg Oral BID    sodium chloride (NS) flush 5-40 mL  5-40 mL IntraVENous Q8H    sodium chloride (NS) flush 5-40 mL  5-40 mL IntraVENous PRN    acetaminophen (TYLENOL) tablet 650 mg  650 mg Oral Q6H PRN    Or    acetaminophen (TYLENOL) suppository 650 mg  650 mg Rectal Q6H PRN      Allergies   Allergen Reactions    Statins-Hmg-Coa Reductase Inhibitors Other (comments)     Other reaction(s): Other (comments)  Severs leg pain(Zetia, Crestor)  Severs leg pain(Zetia, Crestor)        Review of Systems: A complete review of systems was obtained, negative except as described above. Physical Exam:     Visit Vitals  BP (!) 127/91 (BP 1 Location: Right arm, BP Patient Position: At rest)   Pulse 63   Temp 98.1 °F (36.7 °C)   Resp 19   Wt 127 lb 12.8 oz (58 kg)   SpO2 97%   BMI 21.74 kg/m²     ECOG PS: 2  General: No distress  Eyes: PERRLA, anicteric sclerae  HENT: Atraumatic  Neck: Supple  Respiratory:  normal respiratory effort  MS: in bed, states has hard time walking, balance issues  Skin: No rashes, ecchymoses, or petechiae. Normal temperature, turgor, and texture.   Psych: Awake, conversant with some confusion    Results:     Lab Results   Component Value Date/Time    WBC 6.7 07/04/2021 04:46 AM    HGB 14.8 07/04/2021 04:46 AM    HCT 44.4 07/04/2021 04:46 AM    PLATELET 184 90/17/8310 04:46 AM    MCV 87.9 07/04/2021 04:46 AM    ABS. NEUTROPHILS 3.0 05/24/2021 11:30 AM     Lab Results   Component Value Date/Time    Sodium 138 07/04/2021 04:46 AM    Potassium 3.9 07/04/2021 04:46 AM    Chloride 107 07/04/2021 04:46 AM    CO2 25 07/04/2021 04:46 AM    Glucose 138 (H) 07/04/2021 04:46 AM    BUN 17 07/04/2021 04:46 AM    Creatinine 0.67 07/04/2021 04:46 AM    GFR est AA >60 07/04/2021 04:46 AM    GFR est non-AA >60 07/04/2021 04:46 AM    Calcium 9.6 07/04/2021 04:46 AM     Lab Results   Component Value Date/Time    Bilirubin, total 0.5 07/02/2021 10:48 PM    ALT (SGPT) 33 07/02/2021 10:48 PM    Alk. phosphatase 102 07/02/2021 10:48 PM    Protein, total 7.3 07/02/2021 10:48 PM    Albumin 3.6 07/02/2021 10:48 PM    Globulin 3.7 07/02/2021 10:48 PM     CT A/P 5/27/2021  IMPRESSION  No acute intraperitoneal process is identified. Please see above for additional nonemergent incidental findings. MRI brain 6/30/2021  IMPRESSION  1. Multiple (at least 15) supratentorial and infratentorial hemorrhagic  metastases, with some of the largest examples as above. Overall no midline shift  or herniation. MRI brain without contrast 6/30/2021  IMPRESSION  1. Multiple T1 hyperintense lesions in the cerebellum and cerebral hemispheres. Associated edema and relatively mild mass effect. Appearance is suggestive of  multiple metastasis from melanoma. Multiple subacute hemorrhages are less  likely. 2. Chronic age-related volume loss and white matter disease noted. 3. MRI technologists will have patient taken an be seen in the ED as this was  not unexpected finding. Assessment/PLAN:   1) Brain Metastasis on MRI  Sent here from [facility] oncology.    Note from Dr Karma Sales yesterday:  Long discussion with patient, daughter and son--will get PET and Biopsy. Discussed therapies briefly. Immunotherapy is mainstay of treatment for melanoma but need to know about specific mutations before beginning systemic treatment. The CNS disease is symptomatic--would start dexamethasone 8mg bid--spoke with Gabbi Angel and they will start at [facility]. Will discuss tissue dx and biopsy with Dr Zonia Vela. Seen today for fu. In bed. Has some confusion. No primary site of cancer yet. CTs with no definitive site of origin. Only mets in brain so far. Reviewed with pt today. Discussed possible cancer dx but biopsy needed to prove cancer. Neurosurgery eval and considering brain biopsy next week. Will need Rad/onc eval.    PET ordered but not done. Would consult palliative care for goals of care. No family at bedside at my visit. Per outpt oncology discussion, pt wants eval and possible treatment options. Pt will fu with oncology at [facility] at d/c from here. 2) N/V. Supportive meds. Decadron. 3) constipation. Bowel regimen. 4) psychosocial.  Mood good  Lives near [facility]. Has supportive children.    Palliative care eval.     We will follow  Call if questions  D/w nursing at bedside    Signed By: Kalani Jackson DO

## 2021-07-04 NOTE — PROGRESS NOTES
[address] [facility]  Hospitalist Group                                                                                          Hospitalist Progress Note  Mundo Brown MD  Answering service: [phone] from in house phone        Date of Service:  2021  NAME:  Sita Vidal  :  1932  MRN:  [id number]      Admission Summary:   As per HPI : Sita Vidal is a 80 y.o. female with a past medical history of hypertension, dyslipidemia, and prior stroke who presents at the request of Dr. Shirlene Suárez for new hemorrhagic brain lesions concerning for metastatic process.     In the ED, labs were pending at the time of my evaluation. MRI brain from 2021 showed multiple lesions present in the supratentorial, and infratentorial hemorrhagic metastasis with no midline shift or herniation. Interval history / Subjective:     RN reported some episodes of confusion overnight, however on my evaluation, patient was awake, alert, oriented x3, following commands, reported no headache, reported trouble with memory sometimes symptoms.      Assessment & Plan:     #Brain lesions with hemorrhage  -Patient sent by Dr. Shirlene Suárez, as an outpatient and referred patient to [facility] for evaluation of hemorrhagic lesions seen on brain MRI, that are concerning for metastatic cancer.  - continue Decardron, continue Keppra  - Recent CT abd/pevlis shows not primary, CT chest 4 mm nodule in right middle lobe, no other significant pathology observed, case discussed with neurosurgery, possible brain biopsy next week, oncology on board possible radio +/- chemo.   -Patient reports occasional confusion, trouble maintaining balance, reports no other symptoms.     #Hypertension  BP stable, continue home meds     #Dyslipidemia  #hx CVA  -residual LUE paresthesia   - Plavix on hold   -continue statin     FEN: cardiac  dvt ppx: SCD  MPOA: Shola Plaza (son)  Code: Full     Hospital Problems  Date Reviewed: 2021 Codes Class Noted POA    Brain mass ICD-10-CM: G93.89  ICD-9-CM: 348.89  7/2/2021 Unknown                Review of Systems:   Pertinent items are noted in HPI. Vital Signs:    Last 24hrs VS reviewed since prior progress note. Most recent are:  Visit Vitals  BP (!) 127/91 (BP 1 Location: Right arm, BP Patient Position: At rest)   Pulse 63   Temp 98.1 °F (36.7 °C)   Resp 19   Wt 58 kg (127 lb 12.8 oz)   SpO2 97%   BMI 21.74 kg/m²       No intake or output data in the 24 hours ending 07/04/21 1115     Physical Examination:     I had a face to face encounter with this patient and independently examined them on 7/4/2021 as outlined below:          Constitutional:  No acute distress, cooperative, pleasant    ENT:  Oral mucosa moist, oropharynx benign. Resp:  CTA bilaterally. No wheezing/rhonchi/rales. No accessory muscle use   CV:  Regular rhythm, normal rate, no murmurs, gallops, rubs    GI:  Soft, non distended, non tender. normoactive bowel sounds, no hepatosplenomegaly     Musculoskeletal:  No edema, warm, 2+ pulses throughout    Neurologic:  Moves all extremities. AAOx3, CN II-XII reviewed            Data Review:    Review and/or order of clinical lab test      Labs:     Recent Labs     07/04/21 0446 07/03/21  0309   WBC 6.7 4.3   HGB 14.8 14.0   HCT 44.4 42.5    208     Recent Labs     07/04/21  0446 07/03/21  0309 07/02/21  2248    138 137   K 3.9 3.9 3.8    103 102   CO2 25 26 28   BUN 17 14 15   CREA 0.67 0.74 0.81   * 138* 124*   CA 9.6 9.3 9.7     Recent Labs     07/02/21  2248   ALT 33      TBILI 0.5   TP 7.3   ALB 3.6   GLOB 3.7     Recent Labs     07/02/21 2248   INR 1.0   PTP 10.8      No results for input(s): FE, TIBC, PSAT, FERR in the last 72 hours. No results found for: FOL, RBCF   No results for input(s): PH, PCO2, PO2 in the last 72 hours. No results for input(s): CPK, CKNDX, TROIQ in the last 72 hours.     No lab exists for component: CPKMB  Lab Results Component Value Date/Time    Cholesterol, total 163 02/14/2019 12:00 AM    HDL Cholesterol 57 02/14/2019 12:00 AM    LDL, calculated 88 02/14/2019 12:00 AM    Triglyceride 92 02/14/2019 12:00 AM     No results found for: [facility]  Lab Results   Component Value Date/Time    Color YELLOW/STRAW 05/24/2021 11:30 AM    Appearance CLEAR 05/24/2021 11:30 AM    Specific gravity 1.018 05/24/2021 11:30 AM    Specific gravity 1.020 06/03/2017 06:45 PM    pH (UA) 6.0 05/24/2021 11:30 AM    Protein 100 (A) 05/24/2021 11:30 AM    Glucose Negative 05/24/2021 11:30 AM    Ketone Negative 05/24/2021 11:30 AM    Bilirubin NEGATIVE  06/03/2017 06:45 PM    Urobilinogen 0.2 05/24/2021 11:30 AM    Nitrites Negative 05/24/2021 11:30 AM    Leukocyte Esterase MODERATE (A) 05/24/2021 11:30 AM    Epithelial cells MODERATE (A) 05/24/2021 11:30 AM    Bacteria 1+ (A) 05/24/2021 11:30 AM    WBC 5-10 05/24/2021 11:30 AM    RBC 0-5 05/24/2021 11:30 AM         Medications Reviewed:     Current Facility-Administered Medications   Medication Dose Route Frequency    lisinopriL (PRINIVIL, ZESTRIL) tablet 40 mg  40 mg Oral DAILY    ondansetron (ZOFRAN) injection 4 mg  4 mg IntraVENous Q4H PRN    docusate sodium (COLACE) capsule 100 mg  100 mg Oral DAILY    dexamethasone (DECADRON) 4 mg/mL injection 4 mg  4 mg IntraVENous Q6H    hydrALAZINE (APRESOLINE) 20 mg/mL injection 10 mg  10 mg IntraVENous Q6H PRN    0.9% sodium chloride infusion  75 mL/hr IntraVENous CONTINUOUS    levETIRAcetam (KEPPRA) tablet 500 mg  500 mg Oral BID    metoclopramide HCl (REGLAN) tablet 5 mg  5 mg Oral BID    sodium chloride (NS) flush 5-40 mL  5-40 mL IntraVENous Q8H    sodium chloride (NS) flush 5-40 mL  5-40 mL IntraVENous PRN    acetaminophen (TYLENOL) tablet 650 mg  650 mg Oral Q6H PRN    Or    acetaminophen (TYLENOL) suppository 650 mg  650 mg Rectal Q6H PRN     ______________________________________________________________________  EXPECTED LENGTH OF STAY: - - -  ACTUAL LENGTH OF STAY:          2                 Zoila Grier MD

## 2021-07-04 NOTE — PROGRESS NOTES
Problem: Falls - Risk of  Goal: *Absence of Falls  Description: Document Tiffany Jarvis Fall Risk and appropriate interventions in the flowsheet.   Outcome: Progressing Towards Goal  Note: Fall Risk Interventions:  Mobility Interventions: Assess mobility with egress test, Bed/chair exit alarm, Communicate number of staff needed for ambulation/transfer, Patient to call before getting OOB, PT Consult for mobility concerns, PT Consult for assist device competence, Utilize walker, cane, or other assistive device    Mentation Interventions: Adequate sleep, hydration, pain control, Door open when patient unattended, Bed/chair exit alarm, Evaluate medications/consider consulting pharmacy, Increase mobility, More frequent rounding, Reorient patient, Room close to nurse's station, Toileting rounds, Update white board    Medication Interventions: Bed/chair exit alarm, Evaluate medications/consider consulting pharmacy, Patient to call before getting OOB, Teach patient to arise slowly    Elimination Interventions: Bed/chair exit alarm, Call light in reach, Patient to call for help with toileting needs, Stay With Me (per policy), Toilet paper/wipes in reach, Toileting schedule/hourly rounds              Problem: Patient Education: Go to Patient Education Activity  Goal: Patient/Family Education  Outcome: Progressing Towards Goal     Problem: Patient Education:  Go to Education Activity  Goal: Patient/Family Education  Outcome: Progressing Towards Goal     Problem: Brain Tumor Day 1  Goal: Activity/Safety  Outcome: Progressing Towards Goal  Goal: Consults, if ordered  Outcome: Progressing Towards Goal  Goal: Diagnostic Test/Procedures  Outcome: Progressing Towards Goal  Goal: Nutrition/Diet  Outcome: Progressing Towards Goal  Goal: Discharge Planning  Outcome: Progressing Towards Goal  Goal: Medications  Outcome: Progressing Towards Goal  Goal: Respiratory  Outcome: Progressing Towards Goal  Goal: Treatments/Interventions/Procedures  Outcome: Progressing Towards Goal  Goal: *Neurologic stability  Outcome: Progressing Towards Goal  Goal: *Progress independence mobility/activities (eg: Mobility precautions)  Outcome: Progressing Towards Goal  Goal: *Adequate oxygenation  Outcome: Progressing Towards Goal  Goal: *Hemodynamically stable without vasoactive medications  Outcome: Progressing Towards Goal     Problem: Brain Tumor Day 2  Goal: Activity/Safety  Outcome: Progressing Towards Goal  Goal: Consults, if ordered  Outcome: Progressing Towards Goal  Goal: Diagnostic Test/Procedures  Outcome: Progressing Towards Goal  Goal: Nutrition/Diet  Outcome: Progressing Towards Goal  Goal: Medications  Outcome: Progressing Towards Goal  Goal: Respiratory  Outcome: Progressing Towards Goal  Goal: Treatments/Interventions/Procedures  Outcome: Progressing Towards Goal  Goal: *Hemodynamically stable without vasoactive medications  Outcome: Progressing Towards Goal  Goal: *Neurologic stability  Outcome: Progressing Towards Goal  Goal: *Progress independence mobility/activities (eg: Mobility precautions)  Outcome: Progressing Towards Goal  Goal: *Adequate oxygenation  Outcome: Progressing Towards Goal  Goal: *Tolerating diet  Outcome: Progressing Towards Goal     Problem: Brain Tumor Day 3 to Discharge  Goal: Activity/Safety  Outcome: Progressing Towards Goal  Goal: Nutrition/Diet  Outcome: Progressing Towards Goal  Goal: Discharge Planning  Outcome: Progressing Towards Goal  Goal: Medications  Outcome: Progressing Towards Goal  Goal: Treatments/Interventions/Procedures  Outcome: Progressing Towards Goal  Goal: Progressive Mobility and Function  Outcome: Progressing Towards Goal  Goal: Psychosocial  Outcome: Progressing Towards Goal     Problem: Brain Tumor Discharge Outcomes  Goal: *Neurologic stability  Outcome: Progressing Towards Goal  Goal: *Progress independence mobility/activities (eg: Mobility precautions)  Outcome: Progressing Towards Goal  Goal: *Adequate oxygenation  Outcome: Progressing Towards Goal  Goal: *Tolerates nutrition therapy  Outcome: Progressing Towards Goal  Goal: *Verbalizes understanding and describes medication purposes and frequencies  Outcome: Progressing Towards Goal  Goal: *Verbalizes understanding of type and use of pain medication  Outcome: Progressing Towards Goal  Goal: *Describes home care/support arrangements established based on need  Outcome: Progressing Towards Goal  Goal: *Describes available resources and support systems  Outcome: Progressing Towards Goal

## 2021-07-04 NOTE — PROGRESS NOTES
Understand other studies are negative  And will need biopsy of at least one of the brain lesions  I will be away during the week so will pass off to one of my parners  In the meantime will continue steroids and keppra

## 2021-07-04 NOTE — ROUTINE PROCESS
Bedside shift change report given to Cynthia RN (oncoming nurse) by Ruy Lipscomb RN (offgoing nurse). Report included the following information SBAR, Kardex, ED Summary, Procedure Summary, Intake/Output, MAR, Recent Results, Cardiac Rhythm NSR, Quality Measures and Dual Neuro Assessment.

## 2021-07-05 ENCOUNTER — APPOINTMENT (OUTPATIENT)
Dept: CT IMAGING | Age: 86
DRG: 025 | End: 2021-07-05
Attending: NURSE PRACTITIONER
Payer: MEDICARE

## 2021-07-05 LAB
ANION GAP SERPL CALC-SCNC: 8 MMOL/L (ref 5–15)
BUN SERPL-MCNC: 23 MG/DL (ref 6–20)
BUN/CREAT SERPL: 31 (ref 12–20)
CALCIUM SERPL-MCNC: 9 MG/DL (ref 8.5–10.1)
CHLORIDE SERPL-SCNC: 107 MMOL/L (ref 97–108)
CO2 SERPL-SCNC: 24 MMOL/L (ref 21–32)
CREAT SERPL-MCNC: 0.75 MG/DL (ref 0.55–1.02)
ERYTHROCYTE [DISTWIDTH] IN BLOOD BY AUTOMATED COUNT: 13.7 % (ref 11.5–14.5)
GLUCOSE SERPL-MCNC: 153 MG/DL (ref 65–100)
HCT VFR BLD AUTO: 38.1 % (ref 35–47)
HGB BLD-MCNC: 12.8 G/DL (ref 11.5–16)
MCH RBC QN AUTO: 29.4 PG (ref 26–34)
MCHC RBC AUTO-ENTMCNC: 33.6 G/DL (ref 30–36.5)
MCV RBC AUTO: 87.4 FL (ref 80–99)
NRBC # BLD: 0 K/UL (ref 0–0.01)
NRBC BLD-RTO: 0 PER 100 WBC
PLATELET # BLD AUTO: 190 K/UL (ref 150–400)
PMV BLD AUTO: 9.5 FL (ref 8.9–12.9)
POTASSIUM SERPL-SCNC: 4.1 MMOL/L (ref 3.5–5.1)
RBC # BLD AUTO: 4.36 M/UL (ref 3.8–5.2)
SODIUM SERPL-SCNC: 139 MMOL/L (ref 136–145)
WBC # BLD AUTO: 7.2 K/UL (ref 3.6–11)

## 2021-07-05 PROCEDURE — 74011250637 HC RX REV CODE- 250/637: Performed by: NEUROLOGICAL SURGERY

## 2021-07-05 PROCEDURE — 36415 COLL VENOUS BLD VENIPUNCTURE: CPT

## 2021-07-05 PROCEDURE — 74011250637 HC RX REV CODE- 250/637: Performed by: FAMILY MEDICINE

## 2021-07-05 PROCEDURE — 94760 N-INVAS EAR/PLS OXIMETRY 1: CPT

## 2021-07-05 PROCEDURE — 74011250636 HC RX REV CODE- 250/636: Performed by: FAMILY MEDICINE

## 2021-07-05 PROCEDURE — 85027 COMPLETE CBC AUTOMATED: CPT

## 2021-07-05 PROCEDURE — 70450 CT HEAD/BRAIN W/O DYE: CPT

## 2021-07-05 PROCEDURE — 80048 BASIC METABOLIC PNL TOTAL CA: CPT

## 2021-07-05 PROCEDURE — 99232 SBSQ HOSP IP/OBS MODERATE 35: CPT | Performed by: INTERNAL MEDICINE

## 2021-07-05 PROCEDURE — 74011250637 HC RX REV CODE- 250/637: Performed by: INTERNAL MEDICINE

## 2021-07-05 PROCEDURE — 74011250636 HC RX REV CODE- 250/636: Performed by: INTERNAL MEDICINE

## 2021-07-05 PROCEDURE — 65660000000 HC RM CCU STEPDOWN

## 2021-07-05 RX ADMIN — Medication 10 ML: at 15:14

## 2021-07-05 RX ADMIN — DEXAMETHASONE SODIUM PHOSPHATE 4 MG: 4 INJECTION, SOLUTION INTRAMUSCULAR; INTRAVENOUS at 12:23

## 2021-07-05 RX ADMIN — HYDRALAZINE HYDROCHLORIDE 10 MG: 20 INJECTION, SOLUTION INTRAMUSCULAR; INTRAVENOUS at 23:52

## 2021-07-05 RX ADMIN — METOCLOPRAMIDE 5 MG: 10 TABLET ORAL at 09:29

## 2021-07-05 RX ADMIN — LISINOPRIL 40 MG: 20 TABLET ORAL at 09:30

## 2021-07-05 RX ADMIN — DEXAMETHASONE SODIUM PHOSPHATE 4 MG: 4 INJECTION, SOLUTION INTRAMUSCULAR; INTRAVENOUS at 00:39

## 2021-07-05 RX ADMIN — SODIUM CHLORIDE 50 ML/HR: 9 INJECTION, SOLUTION INTRAVENOUS at 05:21

## 2021-07-05 RX ADMIN — Medication 10 ML: at 05:18

## 2021-07-05 RX ADMIN — LEVETIRACETAM 500 MG: 500 TABLET ORAL at 09:29

## 2021-07-05 RX ADMIN — DEXAMETHASONE SODIUM PHOSPHATE 4 MG: 4 INJECTION, SOLUTION INTRAMUSCULAR; INTRAVENOUS at 23:50

## 2021-07-05 RX ADMIN — LEVETIRACETAM 500 MG: 500 TABLET ORAL at 17:38

## 2021-07-05 RX ADMIN — DEXAMETHASONE SODIUM PHOSPHATE 4 MG: 4 INJECTION, SOLUTION INTRAMUSCULAR; INTRAVENOUS at 05:21

## 2021-07-05 RX ADMIN — Medication 10 ML: at 22:15

## 2021-07-05 RX ADMIN — DOCUSATE SODIUM 100 MG: 100 CAPSULE, LIQUID FILLED ORAL at 09:29

## 2021-07-05 RX ADMIN — METOCLOPRAMIDE 5 MG: 10 TABLET ORAL at 17:38

## 2021-07-05 RX ADMIN — DEXAMETHASONE SODIUM PHOSPHATE 4 MG: 4 INJECTION, SOLUTION INTRAMUSCULAR; INTRAVENOUS at 17:39

## 2021-07-05 NOTE — CONSULTS
Comprehensive Nutrition Assessment    Type and Reason for Visit: Initial, Positive nutrition screen    Nutrition Recommendations/Plan:    1. RD to add Ensure Enlive once daily. 2. Continue PO diet as ordered, can liberalize to Regular as needed. Nutrition Assessment:    Past Medical History:   Diagnosis Date    Essential hypertension     Hypercholesterolemia     Menopause     Stroke [facility]) 2011     Pt admitted with new hemorrhagic brain lesions concerning for metastatic process, sent from [facility] oncology. NS eval recommending rad onc as not likely a good surgical candidate given number/size of lesions. Nutrition consult received for oral nutrition supplements and BPA for unintentional wt loss. Spoke with pt, who is intermittently confused - but aware of her confusion, stating \"talking to me is not going to get you anywhere. \" She is quite pleasant, originally from [facility], having lived in the [address] [place] [address] since [place] [address]. She does report her wt ~122# and this is stable for her - chart review reflects this as well. Remove  wt hx in 2018 as 138#. Reports having some meal assistance at home from family, with son Sonu Zapata identified as her next of kin and main contact. She is unable to elaborate much on recent PO intakes but does state she will drink oral supplement if offered - will add once daily Ensure Enlive.      Malnutrition Assessment:  Malnutrition Status:  Mild malnutrition    Context:  Acute illness     Findings of the 6 clinical characteristics of malnutrition:   Energy Intake:  Mild decrease in energy intake (specify)  Weight Loss:  No significant weight loss     Body Fat Loss:  Unable to assess,     Muscle Mass Loss:  1 - Mild muscle mass loss, Clavicles (pectoralis & deltoids)  Fluid Accumulation:  No significant fluid accumulation,     Strength:  Not performed       Nutritionally Significant Medications: Decadron, Colace, Keppra, Reglan, NaCl @ 50 ml/hr      Estimated Daily Nutrient Needs:  Energy (kcal): 1300; Weight Used for Energy Requirements: Current  Protein (g): 70 (1.2 g/kg); Weight Used for Protein Requirements: Current  Fluid (ml/day): ~1300; Method Used for Fluid Requirements: 1 ml/kcal    Nutrition Related Findings:       BM: Abd WDL, +BM 7/2  Edema: None  Wounds:  None     Recent Labs     07/05/21  0044 07/04/21  0446 07/03/21  0309 07/02/21  2248   * 138* 138* 124*   BUN 23* 17 14 15   CREA 0.75 0.67 0.74 0.81    138 138 137   K 4.1 3.9 3.9 3.8    107 103 102   CO2 24 25 26 28   CA 9.0 9.6 9.3 9.7     Recent Labs     07/02/21  2248   ALT 33      TBILI 0.5   TP 7.3   ALB 3.6   GLOB 3.7       Recent Labs     07/05/21  0044 07/04/21  0446   WBC 7.2 6.7   HGB 12.8 14.8   HCT 38.1 44.4    223       Current Nutrition Therapies:   Diet: Cardiac  Supplements: None  Additional Caloric Sources: None    Meal intake: No data found. Supplement Intake: No data found.     Anthropometric Measures:  · Height:  5' 4.29\" (163.3 cm)  · Current Body Wt:  57.6 kg (127 lb)   · Ideal Body Wt:  121:  105 %   · BMI Categories:  Underweight (BMI less than 22) age over 72     Wt Readings from Last 10 Encounters:   07/04/21 58 kg (127 lb 12.8 oz)   07/02/21 53.3 kg (117 lb 6.4 oz)   06/17/21 54.4 kg (120 lb)   06/14/21 54.9 kg (121 lb)   07/10/19 62.1 kg (137 lb)   03/25/19 61.7 kg (136 lb)   03/06/19 61 kg (134 lb 8 oz)   02/14/19 62.1 kg (137 lb)   08/15/18 61.1 kg (134 lb 9.6 oz)   05/16/18 61.2 kg (135 lb)       Nutrition Diagnosis:   · Underweight, Predicted inadequate energy intake related to cognitive or neurological impairment as evidenced by intake 26-50%      Nutrition Interventions:   Food and/or Nutrient Delivery: Continue current diet, Start oral nutrition supplement  Nutrition Education and Counseling: No recommendations at this time  Coordination of Nutrition Care: Continue to monitor while inpatient, Interdisciplinary rounds    Goals:  PO intakes >50% daily meals + 100% daily supplement. Nutrition Monitoring and Evaluation:   Behavioral-Environmental Outcomes: None identified  Food/Nutrient Intake Outcomes: Food and nutrient intake, Supplement intake  Physical Signs/Symptoms Outcomes: Meal time behavior, GI status, Biochemical data    Discharge Planning:     Too soon to determine     Odell Summers RD     Contact via [address] [place] [address]

## 2021-07-05 NOTE — PROGRESS NOTES
Transition of Care Plan   RUR- 12% Low Risk   DISPOSITION: The disposition plan is to transition back to the [address] [place] [address],[address] [place]/[phone] - patient accepted.  F/U with PCP/Specialist     Transport: [facility]     Reason for Admission:  \"very nauseous\"                  RUR Score: 12% Low Risk                    Plan for utilizing home health:   N/A       PCP: First and Last name:  Peyton Winn NP     Name of Practice: [facility]   Are you a current patient: Yes/No: Yes   Approximate date of last visit: Last week   Can you participate in a virtual visit with your PCP: N/A                    Current Advanced Directive/Advance Care Plan: Full Code  - has ACP documentation on file at this time. Healthcare Decision Maker:   Click here to complete [address] including selection of the Healthcare Decision Maker Relationship (ie \"Primary\")   - Son                        Transition of Care Plan:  Pending recommendation. Reviewed chart for transitions of care and discussed in rounds. CM met with patient at bedside to explain role and offer support. Patient is alert and oriented x4 and confirmed demographics. Baseline: Does not utilize DMEs  ADLs/IDALS: Independent   Previous Home Health: No  Previous SNF/IPR: Yes, [facility]  ER Contact: Sebastian Agustin lean - [phone]    Patient lives in an [address] [place] [address] alone. Patient is independent with ADLs/IDALs. Patient reports she does not utilize DMEs to ambulate. Patient's preferred [facility] located on [address] for her perscriptions. CM will schedule [facility] to transport at discharge. Care Management Interventions  PCP Verified by CM: Yes  Palliative Care Criteria Met (RRAT>21 & CHF Dx)?: No  Mode of Transport at Discharge:  Other (see comment)  Transition of Care Consult (CM Consult): Discharge Planning  MyChart Signup: No  Discharge Durable Medical Equipment: No  Physical Therapy Consult: No  Occupational Therapy Consult: No  Speech Therapy Consult: No  Current Support Network: Other (lives at the independent living facilty at [facility])  Confirm Follow Up Transport: Family  The Patient and/or Patient Representative was Provided with a Choice of Provider and Agrees with the Discharge Plan?: Yes  Freedom of Choice List was Provided with Basic Dialogue that Supports the Patient's Individualized Plan of Care/Goals, Treatment Preferences and Shares the Quality Data Associated with the Providers?: Yes  [place] Resource Information Provided?: No    At 10:15am - TW contacted patients son to introduce herself and role. CM will continue to provide updates to patients son once available. Patients son reports that patient does have 24 hour care. She lives at an independent living at [facility]. At 10:20am - TW contacted facility to provide contact information. CM spoke with Marvin Skelton coordinator/[phone] who is requesting daily updated progress notes to be faxed to /[phone]. Patient can return to facility once medically stable to do so. CM messaged attending physician to inquire about potential discharge date. Awaiting response. - Physician reports patient will likely transition either Thursday or Friday. CM will schedule [facility] once patient is medically stable. [facility]  Address: [address] [place] [address]  Phone Number: [phone]    CM will continue to follow, provide support and assist with JULIOCESAR needs as they arise.     Dotty Andres

## 2021-07-05 NOTE — PROGRESS NOTES
Spiritual Care Assessment/Progress Note  [facility]      NAME: Maria Victoria Mitchell      MRN: [id number]  AGE: 80 y.o. SEX: female  Judaism Affiliation: Faith   Language: English     7/5/2021     Total Time (in minutes): 5     Spiritual Assessment begun in [address] through conversation with:         [x]Patient        [x] Family    [] Friend(s)        Reason for Consult: [facility]     Spiritual beliefs: (Please include comment if needed)     [x] Identifies with a hermilo tradition: Faith        [x] Supported by a hermilo community:  None          [] Claims no spiritual orientation:           [] Seeking spiritual identity:                [] Adheres to an individual form of spirituality:           [] Not able to assess:                           Identified resources for coping:      [x] Prayer                               [x] Music                  [] Guided Imagery     [x] Family/friends                 [] Pet visits     [] Devotional reading                         [] Unknown     [] Other:                                               Interventions offered during this visit: (See comments for more details)    Patient Interventions: Affirmation of hermilo, Communion [employer]), Initial/Spiritual assessment, patient floor, Prayer (actual), Prayer (assurance of)     Family/Friend(s):  Affirmation of hermilo     Plan of Care:     [x] Support spiritual and/or cultural needs    [] Support AMD and/or advance care planning process      [] Support grieving process   [] Coordinate Rites and/or Rituals    [] Coordination with community clergy   [] No spiritual needs identified at this time   [] Detailed Plan of Care below (See Comments)  [] Make referral to Music Therapy  [] Make referral to Pet Therapy     [] Make referral to Addiction services  [] Make referral to [facility]  [] Make referral to Spiritual Care Partner  [] No future visits requested        [] Follow up upon further referrals Comments: Mrs. Hernandez Gentile was in bed and visited by her granddaughter and . Mrs. Hernandez Gentile declined communion because she has fallen by the roadside. Told her we like those who fall at the roadside. She laughed. She was interested in one of our pictures of a pink background and black butterfly. She wanted to know where we got it so she can buy one. She couldn't tell me where she saw it. Will attempt to follow-up on Wed.       HERMES Booth, RN, ACSW, LCSW   Page:  [phone]

## 2021-07-05 NOTE — PROGRESS NOTES
Bedside shift change report given to [address] [place] [address] (oncoming nurse) by Guy Garcia RN(offgoing nurse). Report included the following information SBAR, Kardex, Intake/Output, MAR, Cardiac Rhythm NSR/SB and Dual Neuro Assessment.

## 2021-07-05 NOTE — ROUTINE PROCESS
Bedside shift change report given to Cynthia (oncoming nurse) by David Caceres (offgoing nurse). Report included the following information SBAR, ED Summary, MAR, Recent Results and Cardiac Rhythm NSR.

## 2021-07-05 NOTE — PROGRESS NOTES
Problem: Falls - Risk of  Goal: *Absence of Falls  Description: Document Bud Aleman Fall Risk and appropriate interventions in the flowsheet.   Outcome: Progressing Towards Goal  Note: Fall Risk Interventions:  Mobility Interventions: Bed/chair exit alarm    Mentation Interventions: Bed/chair exit alarm, Increase mobility    Medication Interventions: Patient to call before getting OOB    Elimination Interventions: Call light in reach, Toileting schedule/hourly rounds              Problem: Patient Education: Go to Patient Education Activity  Goal: Patient/Family Education  Outcome: Progressing Towards Goal     Problem: Patient Education:  Go to Education Activity  Goal: Patient/Family Education  Outcome: Progressing Towards Goal     Problem: Brain Tumor Day 1  Goal: Activity/Safety  Outcome: Progressing Towards Goal  Goal: Consults, if ordered  Outcome: Progressing Towards Goal  Goal: Diagnostic Test/Procedures  Outcome: Progressing Towards Goal  Goal: Nutrition/Diet  Outcome: Progressing Towards Goal  Goal: Discharge Planning  Outcome: Progressing Towards Goal  Goal: Medications  Outcome: Progressing Towards Goal  Goal: Respiratory  Outcome: Progressing Towards Goal  Goal: Treatments/Interventions/Procedures  Outcome: Progressing Towards Goal  Goal: *Neurologic stability  Outcome: Progressing Towards Goal  Goal: *Progress independence mobility/activities (eg: Mobility precautions)  Outcome: Progressing Towards Goal  Goal: *Adequate oxygenation  Outcome: Progressing Towards Goal  Goal: *Hemodynamically stable without vasoactive medications  Outcome: Progressing Towards Goal     Problem: Brain Tumor Day 2  Goal: Consults, if ordered  Outcome: Progressing Towards Goal  Goal: Diagnostic Test/Procedures  Outcome: Progressing Towards Goal  Goal: Nutrition/Diet  Outcome: Progressing Towards Goal  Goal: Medications  Outcome: Progressing Towards Goal  Goal: Respiratory  Outcome: Progressing Towards Goal  Goal: Treatments/Interventions/Procedures  Outcome: Progressing Towards Goal  Goal: *Hemodynamically stable without vasoactive medications  Outcome: Progressing Towards Goal  Goal: *Neurologic stability  Outcome: Progressing Towards Goal  Goal: *Progress independence mobility/activities (eg: Mobility precautions)  Outcome: Progressing Towards Goal  Goal: *Adequate oxygenation  Outcome: Progressing Towards Goal  Goal: *Tolerating diet  Outcome: Progressing Towards Goal

## 2021-07-05 NOTE — PROGRESS NOTES
Reason for Visit:   Vivien Dhillon is a 80 y.o. female who is seen in hospital for fu new brain metastasis. Treatment History:   None from us yet    History of Present Illness:     Pt seen today in hospital for new brain mets. No known cancer. No tissue dx. Pt sent to [facility] from [facility] by Dr Kiley Tadeo oncology. Had CT A/P that showed constipation. Had PET ordered but not done yet. Pt is in bed in ER. States beeping machines are driving her crazy. Pt little confused at my visit. Not sure why she is here. States has some balance problems with walking. [facility] lives in a community. Has 3 sons. No family at bedside. For admission and ogden. No fevers/ chills/ chest pain/ SOB/ nausea/ vomiting/diarrhea/ pain/    Note yesterday with Dr Kiley Tadeo at [facility] oncology:  Here for above, problems with constipation/n/v for several months. Upper and lower endoscopies were unrevealing. MRI brain showing multiple mets--possible melanoma. Family wants to be aggressive. Patient denies specific complaints. INTERIM HX:  Pt seen today for fu new brain mets/ no tissue dx of cancer. Pt is in bed. Nursing at bedside. Some confusion. Comfortable. No pain.  No new issues   No family at bedside  No fevers/ chills/ chest pain/ SOB/ nausea/ vomiting/diarrhea/ pain/      Past Medical History:   Diagnosis Date    Essential hypertension     Hypercholesterolemia     Menopause     Stroke [facility])       Past Surgical History:   Procedure Laterality Date    COLONOSCOPY N/A 2021    COLONOSCOPY performed by Tyron Ruff MD at [address] [place] [address]  2021         HX COLONOSCOPY  2010    HX GYN       VI,Para V,SAB i    UPPER GI ENDOSCOPY,BIOPSY  2021           Social History     Tobacco Use    Smoking status: Former Smoker     Packs/day: 0.25     Years: 25.00     Pack years: 6.25     Types: Cigarettes     Start date:      Quit date: 1990     Years since quittin.8    Smokeless tobacco: Never Used   Substance Use Topics    Alcohol use: Not Currently     Comment: stopped May 2021      Family History   Problem Relation Age of Onset    Parkinson's Disease Mother     Cancer Father         Gastric     Current Facility-Administered Medications   Medication Dose Route Frequency    lisinopriL (PRINIVIL, ZESTRIL) tablet 40 mg  40 mg Oral DAILY    ondansetron (ZOFRAN) injection 4 mg  4 mg IntraVENous Q4H PRN    docusate sodium (COLACE) capsule 100 mg  100 mg Oral DAILY    dexamethasone (DECADRON) 4 mg/mL injection 4 mg  4 mg IntraVENous Q6H    hydrALAZINE (APRESOLINE) 20 mg/mL injection 10 mg  10 mg IntraVENous Q6H PRN    0.9% sodium chloride infusion  50 mL/hr IntraVENous CONTINUOUS    levETIRAcetam (KEPPRA) tablet 500 mg  500 mg Oral BID    metoclopramide HCl (REGLAN) tablet 5 mg  5 mg Oral BID    sodium chloride (NS) flush 5-40 mL  5-40 mL IntraVENous Q8H    sodium chloride (NS) flush 5-40 mL  5-40 mL IntraVENous PRN    acetaminophen (TYLENOL) tablet 650 mg  650 mg Oral Q6H PRN    Or    acetaminophen (TYLENOL) suppository 650 mg  650 mg Rectal Q6H PRN      Allergies   Allergen Reactions    Statins-Hmg-Coa Reductase Inhibitors Other (comments)     Other reaction(s): Other (comments)  Severs leg pain(Zetia, Crestor)  Severs leg pain(Zetia, Crestor)        Review of Systems: A complete review of systems was obtained, negative except as described above. Physical Exam:     Visit Vitals  BP (!) 160/101   Pulse 64   Temp 97.6 °F (36.4 °C)   Resp 18   Ht 5' 4.29\" (1.633 m)   Wt 127 lb 12.8 oz (58 kg)   SpO2 96%   BMI 21.74 kg/m²     ECOG PS: 2  General: No distress  Eyes: PERRLA, anicteric sclerae  HENT: Atraumatic  Neck: Supple  Respiratory:  normal respiratory effort  MS: in bed moving  Skin: No rashes, ecchymoses, or petechiae. Normal temperature, turgor, and texture.   Psych: Awake, conversant with some confusion    Results:     Lab Results   Component Value Date/Time    WBC 7.2 07/05/2021 12:44 AM    HGB 12.8 07/05/2021 12:44 AM    HCT 38.1 07/05/2021 12:44 AM    PLATELET 885 49/48/0667 12:44 AM    MCV 87.4 07/05/2021 12:44 AM    ABS. NEUTROPHILS 3.0 05/24/2021 11:30 AM     Lab Results   Component Value Date/Time    Sodium 139 07/05/2021 12:44 AM    Potassium 4.1 07/05/2021 12:44 AM    Chloride 107 07/05/2021 12:44 AM    CO2 24 07/05/2021 12:44 AM    Glucose 153 (H) 07/05/2021 12:44 AM    BUN 23 (H) 07/05/2021 12:44 AM    Creatinine 0.75 07/05/2021 12:44 AM    GFR est AA >60 07/05/2021 12:44 AM    GFR est non-AA >60 07/05/2021 12:44 AM    Calcium 9.0 07/05/2021 12:44 AM     Lab Results   Component Value Date/Time    Bilirubin, total 0.5 07/02/2021 10:48 PM    ALT (SGPT) 33 07/02/2021 10:48 PM    Alk. phosphatase 102 07/02/2021 10:48 PM    Protein, total 7.3 07/02/2021 10:48 PM    Albumin 3.6 07/02/2021 10:48 PM    Globulin 3.7 07/02/2021 10:48 PM     CT A/P 5/27/2021  IMPRESSION  No acute intraperitoneal process is identified. Please see above for additional nonemergent incidental findings. MRI brain 6/30/2021  IMPRESSION  1. Multiple (at least 15) supratentorial and infratentorial hemorrhagic  metastases, with some of the largest examples as above. Overall no midline shift  or herniation. MRI brain without contrast 6/30/2021  IMPRESSION  1. Multiple T1 hyperintense lesions in the cerebellum and cerebral hemispheres. Associated edema and relatively mild mass effect. Appearance is suggestive of  multiple metastasis from melanoma. Multiple subacute hemorrhages are less  likely. 2. Chronic age-related volume loss and white matter disease noted. 3. MRI technologists will have patient taken an be seen in the ED as this was  not unexpected finding. Assessment/PLAN:   1) Brain Metastasis on MRI  Sent here from [facility]. Note from Dr Ct Mai yesterday:  Long discussion with patient, daughter and son--will get PET and Biopsy.   Discussed therapies briefly. Immunotherapy is mainstay of treatment for melanoma but need to know about specific mutations before beginning systemic treatment. The CNS disease is symptomatic--would start dexamethasone 8mg bid--spoke with Perez Connell and they will start at [facility]. Will discuss tissue dx and biopsy with Dr Marialuisa Ramey. Seen today for fu. In bed. Has some confusion. No primary site of cancer yet. CTs with no definitive site of origin. Only mets in brain so far. Neurosurgery eval and considering brain biopsy next week. Will need Rad/onc eval.    PET ordered but not done. Would consult palliative care for goals of care. No family at bedside at my visit. Per outpt oncology discussion, pt wants eval and possible treatment options. Pt will fu with oncology at [facility] at d/c from here. 2) N/V. Supportive meds. Decadron. 3) constipation. Bowel regimen. 4) psychosocial.  Mood good  Lives near [facility]. Has supportive children.    Palliative care eval.     We will follow  Call if questions      Signed By: Pj Hensley, DO

## 2021-07-05 NOTE — PROGRESS NOTES
Awake alert stable exam  Multiple mets to brain of unknown origin  Will need tissue diagnosis, may have to ask one of my partners to see her as I will be away after tomorrow

## 2021-07-05 NOTE — PROGRESS NOTES
[address] Adult  Hospitalist Group                                                                                          Hospitalist Progress Note  Geovani Hester MD  Answering service: [phone] from in house phone        Date of Service:  2021  NAME:  Solis Teixeira  :  1932  MRN:  [id number]      Admission Summary:   As per HPI : Solis Teixeira is a 80 y.o. female with a past medical history of hypertension, dyslipidemia, and prior stroke who presents at the request of Dr. Lakeisha Rhodes for new hemorrhagic brain lesions concerning for metastatic process.     In the ED, labs were pending at the time of my evaluation. MRI brain from 2021 showed multiple lesions present in the supratentorial, and infratentorial hemorrhagic metastasis with no midline shift or herniation. Interval history / Subjective:     No events overnight, patient was seen and examined today, she was awake, alert, oriented, reported no new complaints, waiting for brain biopsy.      Assessment & Plan:     #Brain lesions with hemorrhage  -Patient sent by Dr. Lakeisha Rhodes, as an outpatient and referred patient to [facility] for evaluation of hemorrhagic lesions seen on brain MRI, that are concerning for metastatic cancer.  - continue Decardron, continue Keppra  - Recent CT abd/pevlis shows not primary, CT chest 4 mm nodule in right middle lobe, no other significant pathology observed, case discussed with neurosurgery, possible brain biopsy some time this week, oncology on board possible radio +/- chemo.   -Patient reports occasional confusion, trouble maintaining balance, reports no other symptoms.     #Hypertension  BP stable, continue home meds     #Dyslipidemia  #hx CVA  -residual LUE paresthesia   - Plavix on hold   -continue statin     FEN: cardiac  dvt ppx: SCD  MPOA: Nawaf Ng (son)  Code: Full     Hospital Problems  Date Reviewed: 2021        Codes Class Noted POA    Brain mass ICD-10-CM: G93.89  ICD-9-CM: 348.89  7/2/2021 Unknown                Review of Systems:   Pertinent items are noted in HPI. Vital Signs:    Last 24hrs VS reviewed since prior progress note. Most recent are:  Visit Vitals  BP (!) 154/88 (BP 1 Location: Right upper arm, BP Patient Position: At rest)   Pulse (!) 57   Temp 97.7 °F (36.5 °C)   Resp 16   Wt 58 kg (127 lb 12.8 oz)   SpO2 95%   BMI 21.74 kg/m²       No intake or output data in the 24 hours ending 07/05/21 7020     Physical Examination:     I had a face to face encounter with this patient and independently examined them on 7/5/2021 as outlined below:          Constitutional:  No acute distress, cooperative, pleasant    ENT:  Oral mucosa moist, oropharynx benign. Resp:  CTA bilaterally. No wheezing/rhonchi/rales. No accessory muscle use   CV:  Regular rhythm, normal rate, no murmurs, gallops, rubs    GI:  Soft, non distended, non tender. normoactive bowel sounds, no hepatosplenomegaly     Musculoskeletal:  No edema, warm, 2+ pulses throughout    Neurologic:  Moves all extremities. AAOx3, CN II-XII reviewed            Data Review:    Review and/or order of clinical lab test      Labs:     Recent Labs     07/05/21  0044 07/04/21  0446   WBC 7.2 6.7   HGB 12.8 14.8   HCT 38.1 44.4    223     Recent Labs     07/05/21  0044 07/04/21  0446 07/03/21  0309    138 138   K 4.1 3.9 3.9    107 103   CO2 24 25 26   BUN 23* 17 14   CREA 0.75 0.67 0.74   * 138* 138*   CA 9.0 9.6 9.3     Recent Labs     07/02/21  2248   ALT 33      TBILI 0.5   TP 7.3   ALB 3.6   GLOB 3.7     Recent Labs     07/02/21 2248   INR 1.0   PTP 10.8      No results for input(s): FE, TIBC, PSAT, FERR in the last 72 hours. No results found for: FOL, RBCF   No results for input(s): PH, PCO2, PO2 in the last 72 hours. No results for input(s): CPK, CKNDX, TROIQ in the last 72 hours.     No lab exists for component: CPKMB  Lab Results   Component Value Date/Time Cholesterol, total 163 02/14/2019 12:00 AM    HDL Cholesterol 57 02/14/2019 12:00 AM    LDL, calculated 88 02/14/2019 12:00 AM    Triglyceride 92 02/14/2019 12:00 AM     No results found for: [facility]  Lab Results   Component Value Date/Time    Color YELLOW/STRAW 05/24/2021 11:30 AM    Appearance CLEAR 05/24/2021 11:30 AM    Specific gravity 1.018 05/24/2021 11:30 AM    Specific gravity 1.020 06/03/2017 06:45 PM    pH (UA) 6.0 05/24/2021 11:30 AM    Protein 100 (A) 05/24/2021 11:30 AM    Glucose Negative 05/24/2021 11:30 AM    Ketone Negative 05/24/2021 11:30 AM    Bilirubin NEGATIVE  06/03/2017 06:45 PM    Urobilinogen 0.2 05/24/2021 11:30 AM    Nitrites Negative 05/24/2021 11:30 AM    Leukocyte Esterase MODERATE (A) 05/24/2021 11:30 AM    Epithelial cells MODERATE (A) 05/24/2021 11:30 AM    Bacteria 1+ (A) 05/24/2021 11:30 AM    WBC 5-10 05/24/2021 11:30 AM    RBC 0-5 05/24/2021 11:30 AM         Medications Reviewed:     Current Facility-Administered Medications   Medication Dose Route Frequency    lisinopriL (PRINIVIL, ZESTRIL) tablet 40 mg  40 mg Oral DAILY    ondansetron (ZOFRAN) injection 4 mg  4 mg IntraVENous Q4H PRN    docusate sodium (COLACE) capsule 100 mg  100 mg Oral DAILY    dexamethasone (DECADRON) 4 mg/mL injection 4 mg  4 mg IntraVENous Q6H    hydrALAZINE (APRESOLINE) 20 mg/mL injection 10 mg  10 mg IntraVENous Q6H PRN    0.9% sodium chloride infusion  50 mL/hr IntraVENous CONTINUOUS    levETIRAcetam (KEPPRA) tablet 500 mg  500 mg Oral BID    metoclopramide HCl (REGLAN) tablet 5 mg  5 mg Oral BID    sodium chloride (NS) flush 5-40 mL  5-40 mL IntraVENous Q8H    sodium chloride (NS) flush 5-40 mL  5-40 mL IntraVENous PRN    acetaminophen (TYLENOL) tablet 650 mg  650 mg Oral Q6H PRN    Or    acetaminophen (TYLENOL) suppository 650 mg  650 mg Rectal Q6H PRN     ______________________________________________________________________  EXPECTED LENGTH OF STAY: - - -  ACTUAL LENGTH OF STAY: [address], [place]

## 2021-07-06 ENCOUNTER — APPOINTMENT (OUTPATIENT)
Dept: GENERAL RADIOLOGY | Age: 86
DRG: 025 | End: 2021-07-06
Attending: NURSE PRACTITIONER
Payer: MEDICARE

## 2021-07-06 PROCEDURE — 71045 X-RAY EXAM CHEST 1 VIEW: CPT

## 2021-07-06 PROCEDURE — 65660000000 HC RM CCU STEPDOWN

## 2021-07-06 PROCEDURE — 74011250637 HC RX REV CODE- 250/637: Performed by: FAMILY MEDICINE

## 2021-07-06 PROCEDURE — 99232 SBSQ HOSP IP/OBS MODERATE 35: CPT | Performed by: INTERNAL MEDICINE

## 2021-07-06 PROCEDURE — 74011250637 HC RX REV CODE- 250/637: Performed by: NEUROLOGICAL SURGERY

## 2021-07-06 PROCEDURE — 74011250637 HC RX REV CODE- 250/637: Performed by: INTERNAL MEDICINE

## 2021-07-06 PROCEDURE — 74011250636 HC RX REV CODE- 250/636: Performed by: FAMILY MEDICINE

## 2021-07-06 PROCEDURE — 74011250636 HC RX REV CODE- 250/636: Performed by: INTERNAL MEDICINE

## 2021-07-06 RX ADMIN — Medication 10 ML: at 20:42

## 2021-07-06 RX ADMIN — DEXAMETHASONE SODIUM PHOSPHATE 4 MG: 4 INJECTION, SOLUTION INTRAMUSCULAR; INTRAVENOUS at 12:43

## 2021-07-06 RX ADMIN — METOCLOPRAMIDE 5 MG: 10 TABLET ORAL at 08:51

## 2021-07-06 RX ADMIN — METOCLOPRAMIDE 5 MG: 10 TABLET ORAL at 18:30

## 2021-07-06 RX ADMIN — LEVETIRACETAM 500 MG: 500 TABLET ORAL at 18:30

## 2021-07-06 RX ADMIN — LEVETIRACETAM 500 MG: 500 TABLET ORAL at 08:51

## 2021-07-06 RX ADMIN — DEXAMETHASONE SODIUM PHOSPHATE 4 MG: 4 INJECTION, SOLUTION INTRAMUSCULAR; INTRAVENOUS at 05:47

## 2021-07-06 RX ADMIN — Medication 10 ML: at 05:47

## 2021-07-06 RX ADMIN — SODIUM CHLORIDE 50 ML/HR: 9 INJECTION, SOLUTION INTRAVENOUS at 20:51

## 2021-07-06 RX ADMIN — ACETAMINOPHEN 650 MG: 325 TABLET ORAL at 01:37

## 2021-07-06 RX ADMIN — DOCUSATE SODIUM 100 MG: 100 CAPSULE, LIQUID FILLED ORAL at 08:51

## 2021-07-06 RX ADMIN — Medication 10 ML: at 12:43

## 2021-07-06 RX ADMIN — LISINOPRIL 40 MG: 20 TABLET ORAL at 08:51

## 2021-07-06 RX ADMIN — DEXAMETHASONE SODIUM PHOSPHATE 4 MG: 4 INJECTION, SOLUTION INTRAMUSCULAR; INTRAVENOUS at 18:30

## 2021-07-06 NOTE — PROGRESS NOTES
Neurosurgery    Discussed in detail options and choices. Discussed the difficulty  With the posterior fossa lesions - these may get bigger or swell with therapy and there may be nothing that we can do. Discussed doing a craniotomy to biopsy/remove the left frontal lesion. We discussed the risks including: bleeding, rebleeding into the area, infection, injury to the brain including a stroke near or far from where we are working which could lead to weakness on one or both sides of the body, difficulty with speech or memory. We discussed that most people above 80 have measurable cognitive deficits even six months out. We discussed the risks of -  Difficulty or inability to get it all out, the possibility of needing more surgery, or adjunctive therapy such as radiation or chemotherapy, The risk of heart attack, stroke, coma and death,   blood clots in legs, urine infection or pneumonia       The eldest daughter was in room and wants to be aggressive with treatment. Also spoke with the son who is a physician and wants to progress with aggressive treatment as well. Will plan on craniotomy tomorrow morning to biopsy/remove the lesion. 30 minutes were spent with the patient, over half of which was face to face  counseling and coordination of care, discussing with nursing, and family if present and discussing treatment plans.

## 2021-07-06 NOTE — PROGRESS NOTES
Post Fall Documentation      Joe De Jesus witnessed/unwitnessed fall occurred on 7/5/2021 (Date) at 1 (Time). The answers to the following questions summarize the fall: In the patient's own words,:  · What were you attempting to do when you fell? Ambulate to bedside commode  · Do you know why you fell? \"I just lost my balance. \"  · Do you have any pain/discomfort or any other complaints? Chest wall pain  · Which part of your body made contact with the floor or other object? Head made contact with suction cannister    Nurse:  Murray Schwartz Was this an assisted fall? no   Was fall witnessed? Yes     By Whom? DANE Landin If witnessed, what part of the body made contact with the floor or other object? Head made contact with suction cannister   Patients mental status after the fall/when found: Alert and oriented   Any apparent injury:  No apparent injury   Immediate interventions for injury/suspected injury? No interventions needed   Patient assisted back to bed? Assist X1   Name of provider notified and time, any comments? BILL RAMOS notified at 1 on 7/5/2021.  Name of family member notified and time: Pt's son notified at approximately [phone] on 7/5/2021. Immediate VS and physical assessment documented in flow sheets. Neuro assessment every hour x 4 (for potential head injury or unwitnessed fall) documented in flow sheets.       Laquita Jennings RN

## 2021-07-06 NOTE — PROGRESS NOTES
2210: RN was in room with pt when pt stated that she needed to use the bathroom. Nurse assisted pt to edge of bed and assisted her to a standing position with the walker. Bed alarm sounded when pt stood up, and after pt had appeared steady on her feet, nurse left pt's side to turn bed alarm off. Pt states that she \"just lost her balance,\" and fell backwards. She hit her head on suction canister on wall. Pt denies headache and nausea. No obvious signs of bleeding. Pt able to ambulate back to bed with 2 assist and walker. VSS. On-call provider and nursing supervisor notified. Head CT ordered. Family contact updated.

## 2021-07-06 NOTE — PROGRESS NOTES
Bedside and Verbal shift change report given to [address] [place] [address] (oncoming nurse) by Natalie Garner RN (offgoing nurse). Report included the following information SBAR, Kardex, Intake/Output, MAR, Recent Results and Cardiac Rhythm NSR.

## 2021-07-06 NOTE — PROGRESS NOTES
[facility] at [address], [address] [place]: [phone]  F: [phone]    Reason for Visit:   Pilar Quinn is a 80 y.o. female who is seen in hospital for fu new brain metastasis. Treatment History:   · 2021: MRI Brain w/wo Cont - multiple (at least 15) supratentorial and infratentorial hemorrhagic metastases - concerning for malignant melanoma? History of Present Illness:   Patient is a 80 y.o. F who presented to the ED on 21 at the instruction of Dr. Selena Herrera due to new hemorrhagic brain lesions concerning for metastatic process. CT A/P previously performed due to nausea/vomiting and constipation for several months revealing no source of primary disease. She had upper and lower endoscopy which were unrevealing. CT chest performed revealing 4mm lung nodule but no definitive source of disease. Last mammogram 2020 which was benign. She had some mild confusion on arrival     States lives in a community. Has 3 sons. No family at bedside. Interval History:   Patient seen today for follow up new brain lesions. She has had no tissue diagnosis. She states she feels tired today. She denies dizziness/blurred vision. She denies nausea/vomiting. She is oriented x4 today. Denies weakness. States she has not been up to walk much. Denies fevers, chills, chest pain, shortness of breath.        Past Medical History:   Diagnosis Date    Essential hypertension     Hypercholesterolemia     Menopause     Stroke [facility])       Past Surgical History:   Procedure Laterality Date    COLONOSCOPY N/A 2021    COLONOSCOPY performed by Jaclyn Velazquez MD at [address] [place] [address]  2021         HX COLONOSCOPY      HX GYN       VI,Para V,SAB i    UPPER GI ENDOSCOPY,BIOPSY  2021           Social History     Tobacco Use    Smoking status: Former Smoker     Packs/day: 0.25     Years: 25.00     Pack years: 6.25     Types: Cigarettes     Start date: 65     Quit date: 1990     Years since quittin.8    Smokeless tobacco: Never Used   Substance Use Topics    Alcohol use: Not Currently     Comment: stopped May 2021      Family History   Problem Relation Age of Onset    Parkinson's Disease Mother     Cancer Father         Gastric     Current Facility-Administered Medications   Medication Dose Route Frequency    lisinopriL (PRINIVIL, ZESTRIL) tablet 40 mg  40 mg Oral DAILY    ondansetron (ZOFRAN) injection 4 mg  4 mg IntraVENous Q4H PRN    docusate sodium (COLACE) capsule 100 mg  100 mg Oral DAILY    dexamethasone (DECADRON) 4 mg/mL injection 4 mg  4 mg IntraVENous Q6H    hydrALAZINE (APRESOLINE) 20 mg/mL injection 10 mg  10 mg IntraVENous Q6H PRN    0.9% sodium chloride infusion  50 mL/hr IntraVENous CONTINUOUS    levETIRAcetam (KEPPRA) tablet 500 mg  500 mg Oral BID    metoclopramide HCl (REGLAN) tablet 5 mg  5 mg Oral BID    sodium chloride (NS) flush 5-40 mL  5-40 mL IntraVENous Q8H    sodium chloride (NS) flush 5-40 mL  5-40 mL IntraVENous PRN    acetaminophen (TYLENOL) tablet 650 mg  650 mg Oral Q6H PRN    Or    acetaminophen (TYLENOL) suppository 650 mg  650 mg Rectal Q6H PRN      Allergies   Allergen Reactions    Statins-Hmg-Coa Reductase Inhibitors Other (comments)     Other reaction(s): Other (comments)  Severs leg pain(Zetia, Crestor)  Severs leg pain(Zetia, Crestor)        Review of Systems: A complete review of systems was obtained, negative except as described above.     Physical Exam:     Visit Vitals  /84 (BP 1 Location: Right upper arm, BP Patient Position: At rest)   Pulse 68   Temp 97.4 °F (36.3 °C)   Resp 20   Ht 5' 4.29\" (1.633 m)   Wt 127 lb 12.8 oz (58 kg)   SpO2 95%   BMI 21.74 kg/m²     ECOG PS: 2  General: No distress  Eyes: PERRL, anicteric sclerae  HENT: Atraumatic  Neck: Supple  Respiratory:  normal respiratory effort, CTAB  MS: in bed, moves all extremities, symmetric strength  Skin: No rashes, ecchymoses, or petechiae. Normal temperature, turgor, and texture. Psych: Awake, conversant, oriented x4    Results:     Lab Results   Component Value Date/Time    WBC 7.2 07/05/2021 12:44 AM    HGB 12.8 07/05/2021 12:44 AM    HCT 38.1 07/05/2021 12:44 AM    PLATELET 882 26/13/1736 12:44 AM    MCV 87.4 07/05/2021 12:44 AM    ABS. NEUTROPHILS 3.0 05/24/2021 11:30 AM     Lab Results   Component Value Date/Time    Sodium 139 07/05/2021 12:44 AM    Potassium 4.1 07/05/2021 12:44 AM    Chloride 107 07/05/2021 12:44 AM    CO2 24 07/05/2021 12:44 AM    Glucose 153 (H) 07/05/2021 12:44 AM    BUN 23 (H) 07/05/2021 12:44 AM    Creatinine 0.75 07/05/2021 12:44 AM    GFR est AA >60 07/05/2021 12:44 AM    GFR est non-AA >60 07/05/2021 12:44 AM    Calcium 9.0 07/05/2021 12:44 AM     Lab Results   Component Value Date/Time    Bilirubin, total 0.5 07/02/2021 10:48 PM    ALT (SGPT) 33 07/02/2021 10:48 PM    Alk. phosphatase 102 07/02/2021 10:48 PM    Protein, total 7.3 07/02/2021 10:48 PM    Albumin 3.6 07/02/2021 10:48 PM    Globulin 3.7 07/02/2021 10:48 PM     CT A/P 5/27/2021  IMPRESSION  No acute intraperitoneal process is identified. Please see above for additional nonemergent incidental findings. MRI brain 6/30/2021  IMPRESSION  1. Multiple (at least 15) supratentorial and infratentorial hemorrhagic  metastases, with some of the largest examples as above. Overall no midline shift  or herniation. MRI brain without contrast 6/30/2021  IMPRESSION  1. Multiple T1 hyperintense lesions in the cerebellum and cerebral hemispheres. Associated edema and relatively mild mass effect. Appearance is suggestive of  multiple metastasis from melanoma. Multiple subacute hemorrhages are less  likely. 2. Chronic age-related volume loss and white matter disease noted. 3. MRI technologists will have patient taken an be seen in the ED as this was  not unexpected finding.     Assessment/Plan:   1) Multiple Brain Lesions - suspicious for metastatic disease  MRI Brain w/wo Cont performed 6/30/2021 showed multiple (at least 15) supratentorial and infratentorial hemorrhagic metastases - concerning for malignant melanoma? Sent here from \[facility]\"" oncology and originally seen by Dr. Ciera Bowman. CT C/A/P with no definitive site of origin. Awaiting plan from neurosurgery for tissue diagnosis. Patient aware of possible cancer diagnosis and understands plan to await biopsy results. RadOnc consult ordered. Consider palliative care consult. Outpatient PET ordered. Pt will fu with oncology at \[facility]\"" at d/c from here. 2) Nausea/Vomiting   Improved with decadron    3) Constipation   Bowel regimen per primary team  Per patient, improved    4) Psychosocial   Mood good  Lives near \[facility]\"". Has supportive children. Will follow. Call for questions. This patient was seen in conjunction with CHARISMA Campos NP.   For brain biopsy  Await path  No plans for any systemic therapy while here  Pt can fu at \[facility]\"" at d/c       Signed By: Yan Buckner DO

## 2021-07-06 NOTE — PROGRESS NOTES
Rounded on [facility] patients and provided Anointing of the Sick at request of patient.     Anita Isaacs

## 2021-07-06 NOTE — PROGRESS NOTES
Problem: Patient Education: Go to Patient Education Activity  Goal: Patient/Family Education  Outcome: Progressing Towards Goal     Problem: Patient Education:  Go to Education Activity  Goal: Patient/Family Education  Outcome: Progressing Towards Goal     Problem: Brain Tumor Day 1  Goal: Activity/Safety  Outcome: Progressing Towards Goal  Goal: Consults, if ordered  Outcome: Progressing Towards Goal  Goal: Diagnostic Test/Procedures  Outcome: Progressing Towards Goal  Goal: Nutrition/Diet  Outcome: Progressing Towards Goal  Goal: Discharge Planning  Outcome: Progressing Towards Goal  Goal: Medications  Outcome: Progressing Towards Goal  Goal: Respiratory  Outcome: Progressing Towards Goal  Goal: Treatments/Interventions/Procedures  Outcome: Progressing Towards Goal  Goal: *Neurologic stability  Outcome: Progressing Towards Goal  Goal: *Progress independence mobility/activities (eg: Mobility precautions)  Outcome: Progressing Towards Goal  Goal: *Adequate oxygenation  Outcome: Progressing Towards Goal  Goal: *Hemodynamically stable without vasoactive medications  Outcome: Progressing Towards Goal     Problem: Brain Tumor Day 2  Goal: Activity/Safety  Outcome: Progressing Towards Goal  Goal: Consults, if ordered  Outcome: Progressing Towards Goal  Goal: Diagnostic Test/Procedures  Outcome: Progressing Towards Goal  Goal: Nutrition/Diet  Outcome: Progressing Towards Goal  Goal: Medications  Outcome: Progressing Towards Goal  Goal: Respiratory  Outcome: Progressing Towards Goal  Goal: Treatments/Interventions/Procedures  Outcome: Progressing Towards Goal  Goal: *Hemodynamically stable without vasoactive medications  Outcome: Progressing Towards Goal  Goal: *Neurologic stability  Outcome: Progressing Towards Goal  Goal: *Progress independence mobility/activities (eg: Mobility precautions)  Outcome: Progressing Towards Goal  Goal: *Adequate oxygenation  Outcome: Progressing Towards Goal  Goal: *Tolerating diet  Outcome: Progressing Towards Goal     Problem: Brain Tumor Day 3 to Discharge  Goal: Activity/Safety  Outcome: Progressing Towards Goal  Goal: Nutrition/Diet  Outcome: Progressing Towards Goal  Goal: Discharge Planning  Outcome: Progressing Towards Goal  Goal: Medications  Outcome: Progressing Towards Goal  Goal: Treatments/Interventions/Procedures  Outcome: Progressing Towards Goal  Goal: Progressive Mobility and Function  Outcome: Progressing Towards Goal  Goal: Psychosocial  Outcome: Progressing Towards Goal     Problem: Brain Tumor Discharge Outcomes  Goal: *Neurologic stability  Outcome: Progressing Towards Goal  Goal: *Progress independence mobility/activities (eg: Mobility precautions)  Outcome: Progressing Towards Goal  Goal: *Adequate oxygenation  Outcome: Progressing Towards Goal  Goal: *Tolerates nutrition therapy  Outcome: Progressing Towards Goal  Goal: *Verbalizes understanding and describes medication purposes and frequencies  Outcome: Progressing Towards Goal  Goal: *Verbalizes understanding of type and use of pain medication  Outcome: Progressing Towards Goal  Goal: *Describes home care/support arrangements established based on need  Outcome: Progressing Towards Goal  Goal: *Describes available resources and support systems  Outcome: Progressing Towards Goal     Problem: Discharge Planning  Goal: *Discharge to safe environment  Outcome: Progressing Towards Goal  Goal: *Knowledge of medication management  Outcome: Progressing Towards Goal  Goal: *Knowledge of discharge instructions  Outcome: Progressing Towards Goal     Problem: Patient Education: Go to Patient Education Activity  Goal: Patient/Family Education  Outcome: Progressing Towards Goal     Problem: Falls - Risk of  Goal: *Absence of Falls  Description: Document Tyshawn Fall Risk and appropriate interventions in the flowsheet.   Outcome: Not Progressing Towards Goal  Note: Fall Risk Interventions:  Mobility Interventions: Bed/chair exit alarm    Mentation Interventions: Adequate sleep, hydration, pain control, Door open when patient unattended, Evaluate medications/consider consulting pharmacy    Medication Interventions: Bed/chair exit alarm, Patient to call before getting OOB    Elimination Interventions: Call light in reach

## 2021-07-06 NOTE — PROGRESS NOTES
Bedside shift change report given to [facility] (oncoming nurse) by Jelly Allen (offgoing nurse). Report included the following information SBAR, Kardex, Procedure Summary, Intake/Output, MAR, Cardiac Rhythm NSR and Dual Neuro Assessment.

## 2021-07-06 NOTE — PROGRESS NOTES
Transition of Care Plan  · RUR- 12% Low Risk  · DISPOSITION: The disposition plan is to transition back to the [facility]/[phone] - patient accepted. · F/U with PCP/Specialist    · Transport: SHANELLE TUCKER faxed progress notes to [facility] this morning. ([phone])    CM will continue to follow, provide support and assist with JULIOCESAR needs as they arise.     Lasha Huitron

## 2021-07-07 ENCOUNTER — APPOINTMENT (OUTPATIENT)
Dept: CT IMAGING | Age: 86
DRG: 025 | End: 2021-07-07
Attending: NURSE PRACTITIONER
Payer: MEDICARE

## 2021-07-07 ENCOUNTER — ANESTHESIA (OUTPATIENT)
Dept: SURGERY | Age: 86
DRG: 025 | End: 2021-07-07
Payer: MEDICARE

## 2021-07-07 ENCOUNTER — APPOINTMENT (OUTPATIENT)
Dept: GENERAL RADIOLOGY | Age: 86
DRG: 025 | End: 2021-07-07
Attending: ANESTHESIOLOGY
Payer: MEDICARE

## 2021-07-07 ENCOUNTER — ANESTHESIA EVENT (OUTPATIENT)
Dept: SURGERY | Age: 86
DRG: 025 | End: 2021-07-07
Payer: MEDICARE

## 2021-07-07 LAB
ABO + RH BLD: NORMAL
BLOOD GROUP ANTIBODIES SERPL: NORMAL
SPECIMEN EXP DATE BLD: NORMAL

## 2021-07-07 PROCEDURE — 74011250637 HC RX REV CODE- 250/637: Performed by: NEUROLOGICAL SURGERY

## 2021-07-07 PROCEDURE — 77030010813: Performed by: SPECIALIST

## 2021-07-07 PROCEDURE — 77030014007 HC SPNG HEMSTAT J&J -B: Performed by: SPECIALIST

## 2021-07-07 PROCEDURE — 77030014355 HC CVR BUR H TI BIOM -C: Performed by: SPECIALIST

## 2021-07-07 PROCEDURE — 76010000171 HC OR TIME 2 TO 2.5 HR INTENSV-TIER 1: Performed by: SPECIALIST

## 2021-07-07 PROCEDURE — 77030014650 HC SEAL MTRX FLOSEL BAXT -C: Performed by: SPECIALIST

## 2021-07-07 PROCEDURE — 74011000258 HC RX REV CODE- 258: Performed by: NURSE ANESTHETIST, CERTIFIED REGISTERED

## 2021-07-07 PROCEDURE — 74011000636 HC RX REV CODE- 636: Performed by: RADIOLOGY

## 2021-07-07 PROCEDURE — 74011250636 HC RX REV CODE- 250/636: Performed by: NURSE ANESTHETIST, CERTIFIED REGISTERED

## 2021-07-07 PROCEDURE — 74011250637 HC RX REV CODE- 250/637: Performed by: SPECIALIST

## 2021-07-07 PROCEDURE — 74011250637 HC RX REV CODE- 250/637: Performed by: INTERNAL MEDICINE

## 2021-07-07 PROCEDURE — 77030002946 HC SUT NRLN J&J -B: Performed by: SPECIALIST

## 2021-07-07 PROCEDURE — 77030003029 HC SUT VCRL J&J -B: Performed by: SPECIALIST

## 2021-07-07 PROCEDURE — 71045 X-RAY EXAM CHEST 1 VIEW: CPT

## 2021-07-07 PROCEDURE — 77030008684 HC TU ET CUF COVD -B: Performed by: ANESTHESIOLOGY

## 2021-07-07 PROCEDURE — 74011250636 HC RX REV CODE- 250/636: Performed by: SPECIALIST

## 2021-07-07 PROCEDURE — 77010033678 HC OXYGEN DAILY

## 2021-07-07 PROCEDURE — 77030038552 HC DRN WND MDII -A: Performed by: SPECIALIST

## 2021-07-07 PROCEDURE — 77030026438 HC STYL ET INTUB CARD -A: Performed by: ANESTHESIOLOGY

## 2021-07-07 PROCEDURE — 77030003892 HC BIT DRL TWST MEDT -B: Performed by: SPECIALIST

## 2021-07-07 PROCEDURE — 88342 IMHCHEM/IMCYTCHM 1ST ANTB: CPT

## 2021-07-07 PROCEDURE — 74011250637 HC RX REV CODE- 250/637: Performed by: FAMILY MEDICINE

## 2021-07-07 PROCEDURE — 88331 PATH CONSLTJ SURG 1 BLK 1SPC: CPT

## 2021-07-07 PROCEDURE — 77030013137 HC MRK XR CRAN BUSA -A: Performed by: SPECIALIST

## 2021-07-07 PROCEDURE — 74011250636 HC RX REV CODE- 250/636: Performed by: ANESTHESIOLOGY

## 2021-07-07 PROCEDURE — C1713 ANCHOR/SCREW BN/BN,TIS/BN: HCPCS | Performed by: SPECIALIST

## 2021-07-07 PROCEDURE — 65610000006 HC RM INTENSIVE CARE

## 2021-07-07 PROCEDURE — 88307 TISSUE EXAM BY PATHOLOGIST: CPT

## 2021-07-07 PROCEDURE — 76060000035 HC ANESTHESIA 2 TO 2.5 HR: Performed by: SPECIALIST

## 2021-07-07 PROCEDURE — 81272 KIT GENE TARGETED SEQ ANALYS: CPT

## 2021-07-07 PROCEDURE — 03HY32Z INSERTION OF MONITORING DEVICE INTO UPPER ARTERY, PERCUTANEOUS APPROACH: ICD-10-PCS | Performed by: ANESTHESIOLOGY

## 2021-07-07 PROCEDURE — 81210 BRAF GENE: CPT

## 2021-07-07 PROCEDURE — 86901 BLOOD TYPING SEROLOGIC RH(D): CPT

## 2021-07-07 PROCEDURE — 74011000250 HC RX REV CODE- 250: Performed by: SPECIALIST

## 2021-07-07 PROCEDURE — 74011250636 HC RX REV CODE- 250/636: Performed by: FAMILY MEDICINE

## 2021-07-07 PROCEDURE — 2709999900 HC NON-CHARGEABLE SUPPLY: Performed by: SPECIALIST

## 2021-07-07 PROCEDURE — 77030014008 HC SPNG HEMSTAT J&J -C: Performed by: SPECIALIST

## 2021-07-07 PROCEDURE — 36415 COLL VENOUS BLD VENIPUNCTURE: CPT

## 2021-07-07 PROCEDURE — 02HV33Z INSERTION OF INFUSION DEVICE INTO SUPERIOR VENA CAVA, PERCUTANEOUS APPROACH: ICD-10-PCS | Performed by: ANESTHESIOLOGY

## 2021-07-07 PROCEDURE — 74011000250 HC RX REV CODE- 250: Performed by: NURSE ANESTHETIST, CERTIFIED REGISTERED

## 2021-07-07 PROCEDURE — 00B00ZX EXCISION OF BRAIN, OPEN APPROACH, DIAGNOSTIC: ICD-10-PCS | Performed by: SPECIALIST

## 2021-07-07 PROCEDURE — 77030004472 HC BUR TAPR MEDT -B: Performed by: SPECIALIST

## 2021-07-07 PROCEDURE — 76210000006 HC OR PH I REC 0.5 TO 1 HR: Performed by: SPECIALIST

## 2021-07-07 PROCEDURE — 70460 CT HEAD/BRAIN W/DYE: CPT

## 2021-07-07 PROCEDURE — 77030040361 HC SLV COMPR DVT MDII -B: Performed by: SPECIALIST

## 2021-07-07 DEVICE — COVER BUR H L DIA18.5MM THK0.5MM 5 H NEURO TI W/O TAB: Type: IMPLANTABLE DEVICE | Site: CRANIAL | Status: FUNCTIONAL

## 2021-07-07 DEVICE — PLATE BONE SZ 1.5MM REG 6 H SLV DBL Y SHP TRAUMAONE LORENZ: Type: IMPLANTABLE DEVICE | Site: CRANIAL | Status: FUNCTIONAL

## 2021-07-07 DEVICE — SCREW BONE L4MM DIA1.5MM CRANIOMAXILLOFACIAL GLD TI ST: Type: IMPLANTABLE DEVICE | Site: CRANIAL | Status: FUNCTIONAL

## 2021-07-07 RX ORDER — DEXAMETHASONE SODIUM PHOSPHATE 4 MG/ML
4 INJECTION, SOLUTION INTRA-ARTICULAR; INTRALESIONAL; INTRAMUSCULAR; INTRAVENOUS; SOFT TISSUE EVERY 6 HOURS
Status: DISCONTINUED | OUTPATIENT
Start: 2021-07-07 | End: 2021-07-11

## 2021-07-07 RX ORDER — ONDANSETRON 2 MG/ML
4 INJECTION INTRAMUSCULAR; INTRAVENOUS
Status: DISCONTINUED | OUTPATIENT
Start: 2021-07-07 | End: 2021-07-16 | Stop reason: HOSPADM

## 2021-07-07 RX ORDER — DIPHENHYDRAMINE HYDROCHLORIDE 50 MG/ML
12.5 INJECTION, SOLUTION INTRAMUSCULAR; INTRAVENOUS AS NEEDED
Status: DISCONTINUED | OUTPATIENT
Start: 2021-07-07 | End: 2021-07-07 | Stop reason: HOSPADM

## 2021-07-07 RX ORDER — BACITRACIN ZINC 500 UNIT/G
OINTMENT (GRAM) TOPICAL AS NEEDED
Status: DISCONTINUED | OUTPATIENT
Start: 2021-07-07 | End: 2021-07-07 | Stop reason: HOSPADM

## 2021-07-07 RX ORDER — FENTANYL CITRATE 50 UG/ML
50 INJECTION, SOLUTION INTRAMUSCULAR; INTRAVENOUS AS NEEDED
Status: DISCONTINUED | OUTPATIENT
Start: 2021-07-07 | End: 2021-07-07 | Stop reason: HOSPADM

## 2021-07-07 RX ORDER — ONDANSETRON 2 MG/ML
4 INJECTION INTRAMUSCULAR; INTRAVENOUS AS NEEDED
Status: DISCONTINUED | OUTPATIENT
Start: 2021-07-07 | End: 2021-07-07 | Stop reason: HOSPADM

## 2021-07-07 RX ORDER — MORPHINE SULFATE 2 MG/ML
2 INJECTION, SOLUTION INTRAMUSCULAR; INTRAVENOUS
Status: DISCONTINUED | OUTPATIENT
Start: 2021-07-07 | End: 2021-07-07 | Stop reason: HOSPADM

## 2021-07-07 RX ORDER — ATENOLOL 25 MG/1
25 TABLET ORAL DAILY
Status: DISCONTINUED | OUTPATIENT
Start: 2021-07-08 | End: 2021-07-16 | Stop reason: HOSPADM

## 2021-07-07 RX ORDER — SODIUM CHLORIDE 0.9 % (FLUSH) 0.9 %
5-40 SYRINGE (ML) INJECTION AS NEEDED
Status: DISCONTINUED | OUTPATIENT
Start: 2021-07-07 | End: 2021-07-07 | Stop reason: HOSPADM

## 2021-07-07 RX ORDER — MIDAZOLAM HYDROCHLORIDE 1 MG/ML
1 INJECTION, SOLUTION INTRAMUSCULAR; INTRAVENOUS AS NEEDED
Status: DISCONTINUED | OUTPATIENT
Start: 2021-07-07 | End: 2021-07-07 | Stop reason: HOSPADM

## 2021-07-07 RX ORDER — ROPIVACAINE HYDROCHLORIDE 5 MG/ML
30 INJECTION, SOLUTION EPIDURAL; INFILTRATION; PERINEURAL AS NEEDED
Status: DISCONTINUED | OUTPATIENT
Start: 2021-07-07 | End: 2021-07-07 | Stop reason: HOSPADM

## 2021-07-07 RX ORDER — CEFAZOLIN SODIUM 1 G/3ML
INJECTION, POWDER, FOR SOLUTION INTRAMUSCULAR; INTRAVENOUS AS NEEDED
Status: DISCONTINUED | OUTPATIENT
Start: 2021-07-07 | End: 2021-07-07 | Stop reason: HOSPADM

## 2021-07-07 RX ORDER — LABETALOL HYDROCHLORIDE 5 MG/ML
10 INJECTION, SOLUTION INTRAVENOUS
Status: COMPLETED | OUTPATIENT
Start: 2021-07-07 | End: 2021-07-11

## 2021-07-07 RX ORDER — FENTANYL CITRATE 50 UG/ML
INJECTION, SOLUTION INTRAMUSCULAR; INTRAVENOUS AS NEEDED
Status: DISCONTINUED | OUTPATIENT
Start: 2021-07-07 | End: 2021-07-07 | Stop reason: HOSPADM

## 2021-07-07 RX ORDER — ACETAMINOPHEN 325 MG/1
650 TABLET ORAL ONCE
Status: DISCONTINUED | OUTPATIENT
Start: 2021-07-07 | End: 2021-07-07 | Stop reason: HOSPADM

## 2021-07-07 RX ORDER — SODIUM CHLORIDE 9 MG/ML
25 INJECTION, SOLUTION INTRAVENOUS CONTINUOUS
Status: DISCONTINUED | OUTPATIENT
Start: 2021-07-07 | End: 2021-07-07 | Stop reason: HOSPADM

## 2021-07-07 RX ORDER — EPHEDRINE SULFATE/0.9% NACL/PF 50 MG/5 ML
SYRINGE (ML) INTRAVENOUS AS NEEDED
Status: DISCONTINUED | OUTPATIENT
Start: 2021-07-07 | End: 2021-07-07

## 2021-07-07 RX ORDER — SODIUM CHLORIDE 0.9 % (FLUSH) 0.9 %
5-40 SYRINGE (ML) INJECTION EVERY 8 HOURS
Status: DISCONTINUED | OUTPATIENT
Start: 2021-07-07 | End: 2021-07-07 | Stop reason: HOSPADM

## 2021-07-07 RX ORDER — LIDOCAINE HYDROCHLORIDE 10 MG/ML
0.1 INJECTION, SOLUTION EPIDURAL; INFILTRATION; INTRACAUDAL; PERINEURAL AS NEEDED
Status: DISCONTINUED | OUTPATIENT
Start: 2021-07-07 | End: 2021-07-07 | Stop reason: HOSPADM

## 2021-07-07 RX ORDER — HYDRALAZINE HYDROCHLORIDE 20 MG/ML
10 INJECTION INTRAMUSCULAR; INTRAVENOUS
Status: COMPLETED | OUTPATIENT
Start: 2021-07-07 | End: 2021-07-11

## 2021-07-07 RX ORDER — ROCURONIUM BROMIDE 10 MG/ML
INJECTION, SOLUTION INTRAVENOUS AS NEEDED
Status: DISCONTINUED | OUTPATIENT
Start: 2021-07-07 | End: 2021-07-07 | Stop reason: HOSPADM

## 2021-07-07 RX ORDER — SODIUM CHLORIDE, SODIUM LACTATE, POTASSIUM CHLORIDE, CALCIUM CHLORIDE 600; 310; 30; 20 MG/100ML; MG/100ML; MG/100ML; MG/100ML
100 INJECTION, SOLUTION INTRAVENOUS CONTINUOUS
Status: DISCONTINUED | OUTPATIENT
Start: 2021-07-07 | End: 2021-07-07 | Stop reason: HOSPADM

## 2021-07-07 RX ORDER — MIDAZOLAM HYDROCHLORIDE 1 MG/ML
0.5 INJECTION, SOLUTION INTRAMUSCULAR; INTRAVENOUS
Status: DISCONTINUED | OUTPATIENT
Start: 2021-07-07 | End: 2021-07-07 | Stop reason: HOSPADM

## 2021-07-07 RX ORDER — HYDROMORPHONE HYDROCHLORIDE 1 MG/ML
0.5 INJECTION, SOLUTION INTRAMUSCULAR; INTRAVENOUS; SUBCUTANEOUS
Status: DISCONTINUED | OUTPATIENT
Start: 2021-07-07 | End: 2021-07-07 | Stop reason: HOSPADM

## 2021-07-07 RX ORDER — PHENYLEPHRINE HCL IN 0.9% NACL 0.4MG/10ML
SYRINGE (ML) INTRAVENOUS AS NEEDED
Status: DISCONTINUED | OUTPATIENT
Start: 2021-07-07 | End: 2021-07-07 | Stop reason: HOSPADM

## 2021-07-07 RX ORDER — SODIUM CHLORIDE 9 MG/ML
75 INJECTION, SOLUTION INTRAVENOUS CONTINUOUS
Status: DISCONTINUED | OUTPATIENT
Start: 2021-07-07 | End: 2021-07-09

## 2021-07-07 RX ORDER — ONDANSETRON 2 MG/ML
INJECTION INTRAMUSCULAR; INTRAVENOUS AS NEEDED
Status: DISCONTINUED | OUTPATIENT
Start: 2021-07-07 | End: 2021-07-07 | Stop reason: HOSPADM

## 2021-07-07 RX ORDER — SODIUM CHLORIDE 0.9 % (FLUSH) 0.9 %
5-40 SYRINGE (ML) INJECTION EVERY 8 HOURS
Status: DISCONTINUED | OUTPATIENT
Start: 2021-07-07 | End: 2021-07-16 | Stop reason: HOSPADM

## 2021-07-07 RX ORDER — PROPOFOL 10 MG/ML
INJECTION, EMULSION INTRAVENOUS AS NEEDED
Status: DISCONTINUED | OUTPATIENT
Start: 2021-07-07 | End: 2021-07-07 | Stop reason: HOSPADM

## 2021-07-07 RX ORDER — DEXAMETHASONE SODIUM PHOSPHATE 4 MG/ML
INJECTION, SOLUTION INTRA-ARTICULAR; INTRALESIONAL; INTRAMUSCULAR; INTRAVENOUS; SOFT TISSUE AS NEEDED
Status: DISCONTINUED | OUTPATIENT
Start: 2021-07-07 | End: 2021-07-07 | Stop reason: HOSPADM

## 2021-07-07 RX ORDER — LABETALOL HYDROCHLORIDE 5 MG/ML
INJECTION, SOLUTION INTRAVENOUS AS NEEDED
Status: DISCONTINUED | OUTPATIENT
Start: 2021-07-07 | End: 2021-07-07 | Stop reason: HOSPADM

## 2021-07-07 RX ORDER — LIDOCAINE HYDROCHLORIDE 20 MG/ML
INJECTION, SOLUTION EPIDURAL; INFILTRATION; INTRACAUDAL; PERINEURAL AS NEEDED
Status: DISCONTINUED | OUTPATIENT
Start: 2021-07-07 | End: 2021-07-07 | Stop reason: HOSPADM

## 2021-07-07 RX ORDER — PANTOPRAZOLE SODIUM 40 MG/1
40 TABLET, DELAYED RELEASE ORAL
Status: DISCONTINUED | OUTPATIENT
Start: 2021-07-08 | End: 2021-07-16 | Stop reason: HOSPADM

## 2021-07-07 RX ORDER — MORPHINE SULFATE 2 MG/ML
2 INJECTION, SOLUTION INTRAMUSCULAR; INTRAVENOUS
Status: DISCONTINUED | OUTPATIENT
Start: 2021-07-07 | End: 2021-07-16 | Stop reason: HOSPADM

## 2021-07-07 RX ORDER — HYDROCODONE BITARTRATE AND ACETAMINOPHEN 5; 325 MG/1; MG/1
1 TABLET ORAL
Status: DISCONTINUED | OUTPATIENT
Start: 2021-07-07 | End: 2021-07-16 | Stop reason: HOSPADM

## 2021-07-07 RX ORDER — SODIUM CHLORIDE 0.9 % (FLUSH) 0.9 %
5-40 SYRINGE (ML) INJECTION AS NEEDED
Status: DISCONTINUED | OUTPATIENT
Start: 2021-07-07 | End: 2021-07-16 | Stop reason: HOSPADM

## 2021-07-07 RX ORDER — FENTANYL CITRATE 50 UG/ML
25 INJECTION, SOLUTION INTRAMUSCULAR; INTRAVENOUS
Status: DISCONTINUED | OUTPATIENT
Start: 2021-07-07 | End: 2021-07-07 | Stop reason: HOSPADM

## 2021-07-07 RX ORDER — EPHEDRINE SULFATE/0.9% NACL/PF 50 MG/5 ML
SYRINGE (ML) INTRAVENOUS AS NEEDED
Status: DISCONTINUED | OUTPATIENT
Start: 2021-07-07 | End: 2021-07-07 | Stop reason: HOSPADM

## 2021-07-07 RX ORDER — ATORVASTATIN CALCIUM 20 MG/1
20 TABLET, FILM COATED ORAL DAILY
Status: DISCONTINUED | OUTPATIENT
Start: 2021-07-08 | End: 2021-07-16 | Stop reason: HOSPADM

## 2021-07-07 RX ORDER — LIDOCAINE HYDROCHLORIDE AND EPINEPHRINE 10; 10 MG/ML; UG/ML
INJECTION, SOLUTION INFILTRATION; PERINEURAL AS NEEDED
Status: DISCONTINUED | OUTPATIENT
Start: 2021-07-07 | End: 2021-07-07 | Stop reason: HOSPADM

## 2021-07-07 RX ORDER — DOCUSATE SODIUM 100 MG/1
100 CAPSULE, LIQUID FILLED ORAL 2 TIMES DAILY
Status: DISCONTINUED | OUTPATIENT
Start: 2021-07-07 | End: 2021-07-16 | Stop reason: HOSPADM

## 2021-07-07 RX ORDER — SUCCINYLCHOLINE CHLORIDE 20 MG/ML
INJECTION INTRAMUSCULAR; INTRAVENOUS AS NEEDED
Status: DISCONTINUED | OUTPATIENT
Start: 2021-07-07 | End: 2021-07-07 | Stop reason: HOSPADM

## 2021-07-07 RX ADMIN — DEXAMETHASONE SODIUM PHOSPHATE 10 MG: 4 INJECTION, SOLUTION INTRAMUSCULAR; INTRAVENOUS at 12:43

## 2021-07-07 RX ADMIN — FENTANYL CITRATE 100 MCG: 50 INJECTION, SOLUTION INTRAMUSCULAR; INTRAVENOUS at 11:58

## 2021-07-07 RX ADMIN — DEXAMETHASONE SODIUM PHOSPHATE 4 MG: 4 INJECTION, SOLUTION INTRAMUSCULAR; INTRAVENOUS at 17:58

## 2021-07-07 RX ADMIN — METOCLOPRAMIDE 5 MG: 10 TABLET ORAL at 08:33

## 2021-07-07 RX ADMIN — Medication 10 ML: at 05:36

## 2021-07-07 RX ADMIN — SODIUM CHLORIDE 5 MG/HR: 9 INJECTION, SOLUTION INTRAVENOUS at 15:31

## 2021-07-07 RX ADMIN — WATER 2 G: 1 INJECTION INTRAMUSCULAR; INTRAVENOUS; SUBCUTANEOUS at 20:00

## 2021-07-07 RX ADMIN — DEXAMETHASONE SODIUM PHOSPHATE 4 MG: 4 INJECTION, SOLUTION INTRAMUSCULAR; INTRAVENOUS at 01:38

## 2021-07-07 RX ADMIN — PROPOFOL 50 MG: 10 INJECTION, EMULSION INTRAVENOUS at 11:58

## 2021-07-07 RX ADMIN — Medication 10 ML: at 14:00

## 2021-07-07 RX ADMIN — PROPOFOL 30 MG: 10 INJECTION, EMULSION INTRAVENOUS at 11:59

## 2021-07-07 RX ADMIN — IOPAMIDOL 100 ML: 612 INJECTION, SOLUTION INTRAVENOUS at 10:31

## 2021-07-07 RX ADMIN — REMIFENTANIL HYDROCHLORIDE 0.07 MCG/KG/MIN: 1 INJECTION, POWDER, LYOPHILIZED, FOR SOLUTION INTRAVENOUS at 12:11

## 2021-07-07 RX ADMIN — Medication 40 MCG: at 12:01

## 2021-07-07 RX ADMIN — ROCURONIUM BROMIDE 5 MG: 10 SOLUTION INTRAVENOUS at 11:58

## 2021-07-07 RX ADMIN — MIDAZOLAM HYDROCHLORIDE 1 MG: 1 INJECTION, SOLUTION INTRAMUSCULAR; INTRAVENOUS at 10:50

## 2021-07-07 RX ADMIN — HYDRALAZINE HYDROCHLORIDE 10 MG: 20 INJECTION, SOLUTION INTRAMUSCULAR; INTRAVENOUS at 01:38

## 2021-07-07 RX ADMIN — Medication 10 ML: at 22:00

## 2021-07-07 RX ADMIN — DOCUSATE SODIUM 100 MG: 100 CAPSULE, LIQUID FILLED ORAL at 17:58

## 2021-07-07 RX ADMIN — CEFAZOLIN 2 G: 330 INJECTION, POWDER, FOR SOLUTION INTRAMUSCULAR; INTRAVENOUS at 12:20

## 2021-07-07 RX ADMIN — SODIUM CHLORIDE 75 ML/HR: 900 INJECTION, SOLUTION INTRAVENOUS at 14:00

## 2021-07-07 RX ADMIN — FENTANYL CITRATE 50 MCG: 50 INJECTION, SOLUTION INTRAMUSCULAR; INTRAVENOUS at 10:45

## 2021-07-07 RX ADMIN — LABETALOL HYDROCHLORIDE 5 MG: 5 INJECTION INTRAVENOUS at 13:33

## 2021-07-07 RX ADMIN — DEXAMETHASONE SODIUM PHOSPHATE 4 MG: 4 INJECTION, SOLUTION INTRAMUSCULAR; INTRAVENOUS at 05:36

## 2021-07-07 RX ADMIN — Medication 10 ML: at 15:00

## 2021-07-07 RX ADMIN — SODIUM CHLORIDE, POTASSIUM CHLORIDE, SODIUM LACTATE AND CALCIUM CHLORIDE 100 ML/HR: 600; 310; 30; 20 INJECTION, SOLUTION INTRAVENOUS at 10:40

## 2021-07-07 RX ADMIN — ROCURONIUM BROMIDE 45 MG: 10 SOLUTION INTRAVENOUS at 12:05

## 2021-07-07 RX ADMIN — Medication 40 MCG: at 12:05

## 2021-07-07 RX ADMIN — LISINOPRIL 40 MG: 20 TABLET ORAL at 08:33

## 2021-07-07 RX ADMIN — Medication 10 MG: at 12:04

## 2021-07-07 RX ADMIN — LIDOCAINE HYDROCHLORIDE 50 MG: 20 INJECTION, SOLUTION EPIDURAL; INFILTRATION; INTRACAUDAL; PERINEURAL at 11:58

## 2021-07-07 RX ADMIN — ONDANSETRON HYDROCHLORIDE 4 MG: 2 INJECTION, SOLUTION INTRAMUSCULAR; INTRAVENOUS at 12:43

## 2021-07-07 RX ADMIN — METOCLOPRAMIDE 5 MG: 10 TABLET ORAL at 17:58

## 2021-07-07 RX ADMIN — SUCCINYLCHOLINE CHLORIDE 100 MG: 20 INJECTION, SOLUTION INTRAMUSCULAR; INTRAVENOUS at 11:58

## 2021-07-07 RX ADMIN — LEVETIRACETAM 500 MG: 500 TABLET ORAL at 08:33

## 2021-07-07 RX ADMIN — DEXTROSE 5 MG/HR: 5 SOLUTION INTRAVENOUS at 13:21

## 2021-07-07 RX ADMIN — Medication 40 ML: at 14:00

## 2021-07-07 RX ADMIN — LEVETIRACETAM 500 MG: 100 INJECTION, SOLUTION INTRAVENOUS at 12:35

## 2021-07-07 NOTE — ANESTHESIA PROCEDURE NOTES
Central Line Placement    Start time: 7/7/2021 10:46 AM  End time: 7/7/2021 10:57 AM  Performed by: Rancho Sandoval MD  Authorized by: Rancho Sandoval MD     Indications: vascular access  Preanesthetic Checklist: patient identified, risks and benefits discussed, anesthesia consent, site marked, patient being monitored and timeout performed    Timeout Time: 10:45 EDT       Pre-procedure: All elements of maximal sterile barrier technique followed?  Yes    2% Chlorhexidine for cutaneous antisepsis, Hand hygiene performed prior to catheter insertion and Ultrasound guidance    Sterile Ultrasound Technique followed?: No            Procedure:   Prep:  Chlorhexidine    Orientation:  Right  Patient position:  Trendelenburg  Catheter type:  Quad lumen  Catheter size:  8.5 Fr  Catheter length:  16 cm  Number of attempts:  1  Successful placement: Yes      Assessment:   Post-procedure:  Catheter secured, sterile dressing applied and sterile dressing with CHG applied  Assessment:  Blood return through all ports and guidewire removal verified  Insertion:  Uncomplicated  Patient tolerance:  Patient tolerated the procedure well with no immediate complications

## 2021-07-07 NOTE — PROGRESS NOTES
Problem: Falls - Risk of  Goal: *Absence of Falls  Description: Document Stephanie Sheikh Fall Risk and appropriate interventions in the flowsheet.   Outcome: Progressing Towards Goal  Note: Fall Risk Interventions:  Mobility Interventions: Bed/chair exit alarm    Mentation Interventions: Adequate sleep, hydration, pain control    Medication Interventions: Bed/chair exit alarm    Elimination Interventions: Call light in reach              Problem: Patient Education: Go to Patient Education Activity  Goal: Patient/Family Education  Outcome: Progressing Towards Goal     Problem: Patient Education:  Go to Education Activity  Goal: Patient/Family Education  Outcome: Progressing Towards Goal     Problem: Brain Tumor Day 1  Goal: Activity/Safety  Outcome: Progressing Towards Goal  Goal: Consults, if ordered  Outcome: Progressing Towards Goal  Goal: Diagnostic Test/Procedures  Outcome: Progressing Towards Goal  Goal: Nutrition/Diet  Outcome: Progressing Towards Goal  Goal: Discharge Planning  Outcome: Progressing Towards Goal  Goal: Medications  Outcome: Progressing Towards Goal  Goal: Respiratory  Outcome: Progressing Towards Goal  Goal: Treatments/Interventions/Procedures  Outcome: Progressing Towards Goal  Goal: *Neurologic stability  Outcome: Progressing Towards Goal  Goal: *Progress independence mobility/activities (eg: Mobility precautions)  Outcome: Progressing Towards Goal  Goal: *Adequate oxygenation  Outcome: Progressing Towards Goal  Goal: *Hemodynamically stable without vasoactive medications  Outcome: Progressing Towards Goal     Problem: Brain Tumor Day 2  Goal: Activity/Safety  Outcome: Progressing Towards Goal  Goal: Consults, if ordered  Outcome: Progressing Towards Goal  Goal: Diagnostic Test/Procedures  Outcome: Progressing Towards Goal  Goal: Nutrition/Diet  Outcome: Progressing Towards Goal  Goal: Medications  Outcome: Progressing Towards Goal  Goal: Respiratory  Outcome: Progressing Towards Goal  Goal: Treatments/Interventions/Procedures  Outcome: Progressing Towards Goal  Goal: *Hemodynamically stable without vasoactive medications  Outcome: Progressing Towards Goal  Goal: *Neurologic stability  Outcome: Progressing Towards Goal  Goal: *Progress independence mobility/activities (eg: Mobility precautions)  Outcome: Progressing Towards Goal  Goal: *Adequate oxygenation  Outcome: Progressing Towards Goal  Goal: *Tolerating diet  Outcome: Progressing Towards Goal     Problem: Brain Tumor Day 3 to Discharge  Goal: Activity/Safety  Outcome: Progressing Towards Goal  Goal: Nutrition/Diet  Outcome: Progressing Towards Goal  Goal: Discharge Planning  Outcome: Progressing Towards Goal  Goal: Medications  Outcome: Progressing Towards Goal  Goal: Treatments/Interventions/Procedures  Outcome: Progressing Towards Goal  Goal: Progressive Mobility and Function  Outcome: Progressing Towards Goal  Goal: Psychosocial  Outcome: Progressing Towards Goal     Problem: Brain Tumor Discharge Outcomes  Goal: *Neurologic stability  Outcome: Progressing Towards Goal  Goal: *Progress independence mobility/activities (eg: Mobility precautions)  Outcome: Progressing Towards Goal  Goal: *Adequate oxygenation  Outcome: Progressing Towards Goal  Goal: *Tolerates nutrition therapy  Outcome: Progressing Towards Goal  Goal: *Verbalizes understanding and describes medication purposes and frequencies  Outcome: Progressing Towards Goal  Goal: *Verbalizes understanding of type and use of pain medication  Outcome: Progressing Towards Goal  Goal: *Describes home care/support arrangements established based on need  Outcome: Progressing Towards Goal  Goal: *Describes available resources and support systems  Outcome: Progressing Towards Goal     Problem: Discharge Planning  Goal: *Discharge to safe environment  Outcome: Progressing Towards Goal  Goal: *Knowledge of medication management  Outcome: Progressing Towards Goal  Goal: *Knowledge of discharge instructions  Outcome: Progressing Towards Goal     Problem: Patient Education: Go to Patient Education Activity  Goal: Patient/Family Education  Outcome: Progressing Towards Goal

## 2021-07-07 NOTE — ANESTHESIA PROCEDURE NOTES
Arterial Line Placement    Start time: 7/7/2021 10:55 AM  End time: 7/7/2021 11:00 AM  Performed by: Leoncio Rogel MD  Authorized by: Leoncio Rogel MD     Pre-Procedure  Indications:  Arterial pressure monitoring  Preanesthetic Checklist: patient identified, risks and benefits discussed, anesthesia consent, site marked, patient being monitored, timeout performed and patient being monitored    Timeout Time: 10:45 EDT        Procedure:   Prep:  ChloraPrep  Seldinger Technique?: Yes    Orientation:  Right  Location:  Radial artery  Catheter size:  20 G  Number of attempts:  1  Cont Cardiac Output Sensor: No      Assessment:   Post-procedure:  Line secured and sterile dressing applied  Patient Tolerance:  Patient tolerated the procedure well with no immediate complications

## 2021-07-07 NOTE — PROGRESS NOTES
Bedside shift change report given to [address] (oncoming nurse) by Dakota Blair (offgoing nurse). Report included the following information SBAR, Kardex, Procedure Summary, Intake/Output, MAR, Cardiac Rhythm NSR/SB and Dual Neuro Assessment.

## 2021-07-07 NOTE — PROGRESS NOTES
Transition of Care Plan  · RUR- 12% Low Risk  · DISPOSITION: The disposition plan is to transition back to the [address] [place] [address]/[phone] - patient accepted. · F/U with PCP/Specialist    · Transport: [facility]    At 9:15am - CM received a call from [facility]/[phone] who inquired about updated progress notes. CM faxed updated progress notes [facility]/[phone]. CM will continue to follow, provide support and assist with JULIOCESAR needs as they arise.     Magdaline Handing

## 2021-07-07 NOTE — PROGRESS NOTES
Problem: Falls - Risk of  Goal: *Absence of Falls  Description: Document Janneth Marcela Fall Risk and appropriate interventions in the flowsheet.   Outcome: Progressing Towards Goal  Note: Fall Risk Interventions:  Mobility Interventions: Bed/chair exit alarm, Patient to call before getting OOB, PT Consult for mobility concerns, OT consult for ADLs    Mentation Interventions: Adequate sleep, hydration, pain control, Bed/chair exit alarm, Door open when patient unattended    Medication Interventions: Bed/chair exit alarm, Patient to call before getting OOB, Teach patient to arise slowly    Elimination Interventions: Bed/chair exit alarm, Call light in reach, Toileting schedule/hourly rounds    History of Falls Interventions: Bed/chair exit alarm, Room close to nurse's station         Problem: Brain Tumor Day 3 to Discharge  Goal: Activity/Safety  Outcome: Progressing Towards Goal  Goal: Nutrition/Diet  Outcome: Progressing Towards Goal  Goal: Discharge Planning  Outcome: Progressing Towards Goal  Goal: Medications  Outcome: Progressing Towards Goal  Goal: Treatments/Interventions/Procedures  Outcome: Progressing Towards Goal

## 2021-07-07 NOTE — PERIOP NOTES
TRANSFER - OUT REPORT:    Verbal report given to  CUCO Rhoades(name) on Consuelo Cole  being transferred to ICU 16(unit) for routine post - op       Report consisted of patients Situation, Background, Assessment and   Recommendations(SBAR). Time Pre op antibiotic given:12:20 Anef  Anesthesia Stop time: 13:53  [place] Present on Transfer to floor:yes  Order for [place] on Chart:yes  Discharge Prescriptions with Chart:no    Information from the following report(s) SBAR, Kardex, OR Summary, Procedure Summary, Intake/Output, MAR, Recent Results, Med Rec Status and Cardiac Rhythm SR was reviewed with the receiving nurse. Opportunity for questions and clarification was provided. Is the patient on 02? YES       L/Min 2     Other     Is the patient on a monitor? YES    Is the nurse transporting with the patient? YES    Surgical Waiting Area notified of patient's transfer from PACU? YES      The following personal items collected during your admission accompanied patient upon transfer:   Dental Appliance: Dental Appliances: None  Vision:    Hearing Aid:    Jewelry: Jewelry: None  Clothing: Clothing:  (bag of clothes returned to pt.)  Other Valuables:  Other Valuables: None  Valuables sent to safe:

## 2021-07-07 NOTE — BRIEF OP NOTE
Brief Postoperative Note    Patient: Mahesh Martin  YOB: 1932  MRN: [id number]    Date of Procedure: 7/7/2021     Pre-Op Diagnosis: BRAIN METASTASIS    Post-Op Diagnosis: Same as preoperative diagnosis. Procedure(s):  LEFT FRONTAL CRANIOTOMY WITH RESECTION INTRA CRANIAL METASTASIS WITH BRAIN LAB     Surgeon(s):  John Colbert MD    Surgical Assistant: Surg Asst-1: Lucila Nyhan    Anesthesia: General     Estimated Blood Loss (mL): less than 50     Complications: None    Specimens:   ID Type Source Tests Collected by Time Destination   1 : Left frontal brain tumor Frozen Section Brain  John Colbert MD 7/7/2021 1250 Pathology   2 : Left frontal brain tumor Fresh Brain  John Colbert MD 7/7/2021 1307 Pathology        Implants:   Implant Name Type Inv.  Item Serial No.  Lot No. LRB No. Used Action   COVER BUR H L DIA18.5MM THK0.5MM 5 H NEURO TI W/O TAB - SNA Plate COVER BUR H L OCK27.1VR THK0.5MM 5 H NEURO TI W/O TAB NA [employer]ET MICROFIXATION_WD NA Left 1 Implanted   PLATE BONE SZ 3.2RQ REG 6 H SLV DBL Y SHP TRAUMAONE MOIZ - SNA Plate PLATE BONE SZ 2.8RC REG 6 H SLV DBL Y SHP TRAUMAONE MOIZ NA [employer]OMET MICROFIXATION_WD NA Left 1 Implanted   SCREW BONE L4MM DIA1.5MM CRANIOMAXILLOFACIAL GLD TI ST - SNA Screw SCREW BONE L4MM DIA1.5MM CRANIOMAXILLOFACIAL GLD TI ST NA [employer]OMET MICROFIXATION_WD NA Left 8 Implanted       Drains: * No LDAs found *    Findings: dark black lesion    Electronically Signed by Ashu Desnon MD on 7/7/2021 at 1:36 PM

## 2021-07-07 NOTE — ANESTHESIA POSTPROCEDURE EVALUATION
Post-Anesthesia Evaluation and Assessment    Patient: Ron Prakash MRN: [id number]  SSN: [id number]    YOB: 1932  Age: 80 y.o. Sex: female      I have evaluated the patient and they are stable and ready for discharge from the PACU. Cardiovascular Function/Vital Signs  Visit Vitals  BP (!) 144/81   Pulse 80   Temp 36.8 °C (98.2 °F)   Resp 19   Ht 5' 4\" (1.626 m)   Wt 56.7 kg (125 lb)   SpO2 94%   BMI 21.46 kg/m²       Patient is status post General anesthesia for Procedure(s):  LEFT FRONTAL CRANIOTOMY WITH RESECTION INTRA CRANIAL METASTASIS WITH BRAIN LAB (URGENT). Nausea/Vomiting: None    Postoperative hydration reviewed and adequate. Pain:  Pain Scale 1: Numeric (0 - 10) (07/07/21 0940)  Pain Intensity 1: 0 (07/07/21 0940)   Managed    Neurological Status:   Neuro  Neurologic State: Alert (07/07/21 0800)  Orientation Level: Oriented X4 (intermittent confusion) (07/07/21 0800)  Cognition: Follows commands (07/07/21 0800)  Speech: Clear (07/07/21 0800)  Assessment L Pupil: Brisk;Round (07/07/21 0800)  Size L Pupil (mm): 2 (07/07/21 0800)  Assessment R Pupil: Brisk;Round (07/07/21 0800)  Size R Pupil (mm): 2 (07/07/21 0800)  LUE Motor Response: Purposeful (07/07/21 0800)  LLE Motor Response: Purposeful;Weak (07/07/21 0800)  RUE Motor Response: Purposeful (07/07/21 0800)  RLE Motor Response: Purposeful;Weak (07/07/21 0800)   At baseline    Mental Status, Level of Consciousness: Alert and  oriented to person, place, and time    Pulmonary Status:   O2 Device: Nasal cannula (07/07/21 1352)   Adequate oxygenation and airway patent    Complications related to anesthesia: None    Post-anesthesia assessment completed. No concerns    Signed By: Zohaib Prado MD     July 7, 2021              Procedure(s):  LEFT FRONTAL CRANIOTOMY WITH RESECTION INTRA CRANIAL METASTASIS WITH BRAIN LAB (URGENT).     general    <BSHSIANPOST>    INITIAL Post-op Vital signs:   Vitals Value Taken Time   /84 07/07/21 1410   Temp     Pulse 79 07/07/21 1411   Resp 20 07/07/21 1411   SpO2 93 % 07/07/21 1411   Vitals shown include unvalidated device data.

## 2021-07-07 NOTE — PROGRESS NOTES
1515: TRANSFER - IN REPORT:    Verbal report received from [address] RN (name) on Si Edu  being received from PACU (unit) for routine post - op      Report consisted of patients Situation, Background, Assessment and   Recommendations(SBAR). Information from the following report(s) SBAR, Kardex, Intake/Output, MAR, Recent Results, Med Rec Status, Cardiac Rhythm NSR, Alarm Parameters  and Dual Neuro Assessment was reviewed with the receiving nurse. Opportunity for questions and clarification was provided. Assessment completed upon patients arrival to unit and care assumed. Drips verified upon admission to ICU:   -Cardene @ 5 mg/hr  -NS @ 75 ml/hr     Patient placed on a bed alarm d/t previous fall. Curtain remains open. 1930: Bedside and Verbal shift change report given to VIRGINIE Hoang (oncoming nurse) by [employer]. Nany Cline RN (offgoing nurse). Report included the following information SBAR, Kardex, Intake/Output, MAR, Recent Results, Med Rec Status, Cardiac Rhythm NSR, Alarm Parameters  and Dual Neuro Assessment.

## 2021-07-07 NOTE — PROGRESS NOTES
Post-operatively patient was sent to ICU, Hospitalist team will sign off at this time, please reconsult hospitalist team when needed.  Thanks

## 2021-07-07 NOTE — PROGRESS NOTES
Physician Progress Note      Prerna Pace  CSN #:                  [id number]  :                       1932  ADMIT DATE:       2021 10:05 PM  [address] [place] DATE:  RESPONDING  PROVIDER #:        Malgorzata Rosales MD          QUERY TEXT:    Pt admitted with multiple Brain Lesions concerning for Mets, with note on CT Head of 'areas of edema in bilateral cerebellar hemispheres', no midline shift. Pt being treated with IV Decadron. If clinically significant, please document in progress notes and discharge summary if you are evaluating/treating any of the following: The medical record reflects the following:  Risk Factors: newly found brain mets  Clinical Indicators: admitted with abnormal findings on OP MRI, pt with mild intermittent confusion, unsteady on feet at times. Head CT showing edema in cerebellar hemispheres, but no midline shift. Has been hypertensive w/ reading frequently > 443 diastolic. GCS 14  Treatment: Decadron IV, Apresoline IV prn, Keppra po BID, NS Consult, neuro checks, pt scheduled for Craniotomy. Thank you, if you have any questions please e-mail me at  [email or web address]  [phone]  Options provided:  -- Cerebral edema  -- Brain compression  -- Cerebral edema and Brain compression  -- Other - I will add my own diagnosis  -- Disagree - Not applicable / Not valid  -- Disagree - Clinically unable to determine / Unknown  -- Refer to Clinical Documentation Reviewer    PROVIDER RESPONSE TEXT:    This patient has cerebral edema.     Query created by: Kate Palacios on 2021 1:16 PM      Electronically signed by:  Malgorzata Rosales MD 2021 6:23 PM

## 2021-07-07 NOTE — ANESTHESIA PREPROCEDURE EVALUATION
Relevant Problems   No relevant active problems       Anesthetic History   No history of anesthetic complications            Review of Systems / Medical History  Patient summary reviewed, nursing notes reviewed and pertinent labs reviewed    Pulmonary  Within defined limits                 Neuro/Psych       CVA       Cardiovascular    Hypertension          Hyperlipidemia    Exercise tolerance: <4 METS     GI/Hepatic/Renal  Within defined limits              Endo/Other        Arthritis     Other Findings              Physical Exam    Airway  Mallampati: II  TM Distance: > 6 cm  Neck ROM: normal range of motion   Mouth opening: Normal     Cardiovascular  Regular rate and rhythm,  S1 and S2 normal,  no murmur, click, rub, or gallop             Dental  No notable dental hx       Pulmonary  Breath sounds clear to auscultation               Abdominal  GI exam deferred       Other Findings            Anesthetic Plan    ASA: 3  Anesthesia type: general    Monitoring Plan: Arterial line and CVP      Induction: Intravenous  Anesthetic plan and risks discussed with: Patient

## 2021-07-07 NOTE — PROGRESS NOTES
[facility] visit follow-up  Sr. Pa Chapa attempted to locate the picture Mrs. Saintclair Kelp was asking about with black buttterfly and pink flowers. Checked photos in ER and all are butterflies. Unable to relay information to mrs. Saintclair Kelp today because of her medical procedure. Prayer offered today for her well-being.     HERMES Dougherty, RN, ACSW, LCSW   Page:  [phone]

## 2021-07-07 NOTE — ROUTINE PROCESS
TRANSFER - IN REPORT:    Verbal report received from [address] (name) on Herbie Christie  being received from [address] [place] [address] (unit) for routine progression of care      Report consisted of patients Situation, Background, Assessment and   Recommendations(SBAR). Information from the following report(s) SBAR, Kardex, Intake/Output, MAR, Recent Results and Med Rec Status was reviewed with the receiving nurse. Opportunity for questions and clarification was provided. Assessment completed upon patients arrival to unit and care assumed.

## 2021-07-07 NOTE — ROUTINE PROCESS
Patient: Sandoval Grijalva MRN: [id number]  SSN: [id number]   YOB: 1932  Age: 80 y.o. Sex: female     Patient is status post Procedure(s):  LEFT FRONTAL CRANIOTOMY WITH RESECTION INTRA CRANIAL METASTASIS WITH BRAIN LAB (URGENT). Surgeon(s) and Role:     * Whitney Carvajal MD - Primary    Local/Dose/Irrigation:  1ml lidocaine 1% with epi 1:100,000               Quad Lumen 07/07/21 Right (Active)        Peripheral IV 07/06/21 Right Antecubital (Active)   Site Assessment Clean, dry, & intact 07/07/21 0800   Phlebitis Assessment 0 07/07/21 0800   Infiltration Assessment 0 07/07/21 0800   Dressing Status Clean, dry, & intact 07/07/21 0800   Dressing Type Transparent 07/07/21 0800   Hub Color/Line Status Blue; Infusing 07/07/21 0800   Action Taken Open ports on tubing capped 07/07/21 0800   Alcohol Cap Used Yes 07/07/21 0800       Peripheral IV 07/07/21 Left Wrist (Active)      Arterial Line 07/07/21 Right Radial artery (Active)          Airway - Endotracheal Tube 07/07/21 Oral (Active)                   Dressing/Packing:  Incision 07/07/21 Head Left-Dressing/Treatment:  (telfa stapled to cover wound) (07/06/21 0800)

## 2021-07-08 ENCOUNTER — APPOINTMENT (OUTPATIENT)
Dept: CT IMAGING | Age: 86
DRG: 025 | End: 2021-07-08
Attending: SPECIALIST
Payer: MEDICARE

## 2021-07-08 LAB
ANION GAP SERPL CALC-SCNC: 6 MMOL/L (ref 5–15)
BASOPHILS # BLD: 0 K/UL (ref 0–0.1)
BASOPHILS NFR BLD: 0 % (ref 0–1)
BUN SERPL-MCNC: 12 MG/DL (ref 6–20)
BUN/CREAT SERPL: 23 (ref 12–20)
CALCIUM SERPL-MCNC: 8.5 MG/DL (ref 8.5–10.1)
CHLORIDE SERPL-SCNC: 105 MMOL/L (ref 97–108)
CO2 SERPL-SCNC: 25 MMOL/L (ref 21–32)
CREAT SERPL-MCNC: 0.52 MG/DL (ref 0.55–1.02)
DIFFERENTIAL METHOD BLD: ABNORMAL
EOSINOPHIL # BLD: 0 K/UL (ref 0–0.4)
EOSINOPHIL NFR BLD: 0 % (ref 0–7)
ERYTHROCYTE [DISTWIDTH] IN BLOOD BY AUTOMATED COUNT: 13.9 % (ref 11.5–14.5)
GLUCOSE SERPL-MCNC: 164 MG/DL (ref 65–100)
HCT VFR BLD AUTO: 40.3 % (ref 35–47)
HGB BLD-MCNC: 13.7 G/DL (ref 11.5–16)
IMM GRANULOCYTES # BLD AUTO: 0 K/UL (ref 0–0.04)
IMM GRANULOCYTES NFR BLD AUTO: 0 % (ref 0–0.5)
LYMPHOCYTES # BLD: 0.6 K/UL (ref 0.8–3.5)
LYMPHOCYTES NFR BLD: 6 % (ref 12–49)
MCH RBC QN AUTO: 29.7 PG (ref 26–34)
MCHC RBC AUTO-ENTMCNC: 34 G/DL (ref 30–36.5)
MCV RBC AUTO: 87.4 FL (ref 80–99)
MONOCYTES # BLD: 0.8 K/UL (ref 0–1)
MONOCYTES NFR BLD: 8 % (ref 5–13)
NEUTS SEG # BLD: 8.6 K/UL (ref 1.8–8)
NEUTS SEG NFR BLD: 86 % (ref 32–75)
NRBC # BLD: 0 K/UL (ref 0–0.01)
NRBC BLD-RTO: 0 PER 100 WBC
PLATELET # BLD AUTO: 157 K/UL (ref 150–400)
PMV BLD AUTO: 9.7 FL (ref 8.9–12.9)
POTASSIUM SERPL-SCNC: 3.4 MMOL/L (ref 3.5–5.1)
RBC # BLD AUTO: 4.61 M/UL (ref 3.8–5.2)
RBC MORPH BLD: ABNORMAL
SODIUM SERPL-SCNC: 136 MMOL/L (ref 136–145)
WBC # BLD AUTO: 10 K/UL (ref 3.6–11)

## 2021-07-08 PROCEDURE — 77010033678 HC OXYGEN DAILY

## 2021-07-08 PROCEDURE — 74011250636 HC RX REV CODE- 250/636: Performed by: NURSE PRACTITIONER

## 2021-07-08 PROCEDURE — 74011250636 HC RX REV CODE- 250/636: Performed by: NURSE ANESTHETIST, CERTIFIED REGISTERED

## 2021-07-08 PROCEDURE — C1751 CATH, INF, PER/CENT/MIDLINE: HCPCS

## 2021-07-08 PROCEDURE — 65610000006 HC RM INTENSIVE CARE

## 2021-07-08 PROCEDURE — 74011250637 HC RX REV CODE- 250/637: Performed by: SPECIALIST

## 2021-07-08 PROCEDURE — 74011000258 HC RX REV CODE- 258: Performed by: SPECIALIST

## 2021-07-08 PROCEDURE — 92610 EVALUATE SWALLOWING FUNCTION: CPT

## 2021-07-08 PROCEDURE — 74011000250 HC RX REV CODE- 250

## 2021-07-08 PROCEDURE — 77030005402 HC CATH RAD ART LN KT TELE -B

## 2021-07-08 PROCEDURE — 74011000250 HC RX REV CODE- 250: Performed by: SPECIALIST

## 2021-07-08 PROCEDURE — 94760 N-INVAS EAR/PLS OXIMETRY 1: CPT

## 2021-07-08 PROCEDURE — 97530 THERAPEUTIC ACTIVITIES: CPT

## 2021-07-08 PROCEDURE — 97165 OT EVAL LOW COMPLEX 30 MIN: CPT

## 2021-07-08 PROCEDURE — 36415 COLL VENOUS BLD VENIPUNCTURE: CPT

## 2021-07-08 PROCEDURE — 74011000250 HC RX REV CODE- 250: Performed by: NURSE ANESTHETIST, CERTIFIED REGISTERED

## 2021-07-08 PROCEDURE — 74011250636 HC RX REV CODE- 250/636: Performed by: SPECIALIST

## 2021-07-08 PROCEDURE — 97162 PT EVAL MOD COMPLEX 30 MIN: CPT

## 2021-07-08 PROCEDURE — 74011000258 HC RX REV CODE- 258: Performed by: NURSE PRACTITIONER

## 2021-07-08 PROCEDURE — 80048 BASIC METABOLIC PNL TOTAL CA: CPT

## 2021-07-08 PROCEDURE — 92523 SPEECH SOUND LANG COMPREHEN: CPT

## 2021-07-08 PROCEDURE — 70450 CT HEAD/BRAIN W/O DYE: CPT

## 2021-07-08 PROCEDURE — 77030013797 HC KT TRNSDUC PRSSR EDWD -A

## 2021-07-08 PROCEDURE — 85025 COMPLETE CBC W/AUTO DIFF WBC: CPT

## 2021-07-08 RX ORDER — POTASSIUM CHLORIDE 29.8 MG/ML
20 INJECTION INTRAVENOUS
Status: COMPLETED | OUTPATIENT
Start: 2021-07-08 | End: 2021-07-08

## 2021-07-08 RX ORDER — BACITRACIN 500 UNIT/G
PACKET (EA) TOPICAL
Status: COMPLETED
Start: 2021-07-08 | End: 2021-07-08

## 2021-07-08 RX ADMIN — DOCUSATE SODIUM 100 MG: 100 CAPSULE, LIQUID FILLED ORAL at 19:01

## 2021-07-08 RX ADMIN — DEXAMETHASONE SODIUM PHOSPHATE 4 MG: 4 INJECTION, SOLUTION INTRAMUSCULAR; INTRAVENOUS at 00:00

## 2021-07-08 RX ADMIN — LEVETIRACETAM 500 MG: 100 INJECTION, SOLUTION INTRAVENOUS at 00:00

## 2021-07-08 RX ADMIN — SODIUM CHLORIDE 5 MG/HR: 9 INJECTION, SOLUTION INTRAVENOUS at 22:13

## 2021-07-08 RX ADMIN — LEVETIRACETAM 1000 MG: 100 INJECTION, SOLUTION INTRAVENOUS at 10:55

## 2021-07-08 RX ADMIN — ATENOLOL 25 MG: 25 TABLET ORAL at 08:54

## 2021-07-08 RX ADMIN — POTASSIUM CHLORIDE 20 MEQ: 400 INJECTION, SOLUTION INTRAVENOUS at 05:00

## 2021-07-08 RX ADMIN — DOCUSATE SODIUM 100 MG: 100 CAPSULE, LIQUID FILLED ORAL at 08:54

## 2021-07-08 RX ADMIN — WATER 2 G: 1 INJECTION INTRAMUSCULAR; INTRAVENOUS; SUBCUTANEOUS at 12:11

## 2021-07-08 RX ADMIN — ATORVASTATIN CALCIUM 20 MG: 20 TABLET, FILM COATED ORAL at 08:54

## 2021-07-08 RX ADMIN — SODIUM CHLORIDE 5 MG/HR: 9 INJECTION, SOLUTION INTRAVENOUS at 23:20

## 2021-07-08 RX ADMIN — ACETAMINOPHEN 650 MG: 160 SOLUTION ORAL at 05:11

## 2021-07-08 RX ADMIN — DEXAMETHASONE SODIUM PHOSPHATE 4 MG: 4 INJECTION, SOLUTION INTRAMUSCULAR; INTRAVENOUS at 23:03

## 2021-07-08 RX ADMIN — PANTOPRAZOLE SODIUM 40 MG: 40 TABLET, DELAYED RELEASE ORAL at 07:30

## 2021-07-08 RX ADMIN — HYDRALAZINE HYDROCHLORIDE 10 MG: 20 INJECTION INTRAMUSCULAR; INTRAVENOUS at 03:22

## 2021-07-08 RX ADMIN — DEXAMETHASONE SODIUM PHOSPHATE 4 MG: 4 INJECTION, SOLUTION INTRAMUSCULAR; INTRAVENOUS at 12:12

## 2021-07-08 RX ADMIN — METOCLOPRAMIDE 5 MG: 10 TABLET ORAL at 19:01

## 2021-07-08 RX ADMIN — ONDANSETRON 4 MG: 2 INJECTION INTRAMUSCULAR; INTRAVENOUS at 22:38

## 2021-07-08 RX ADMIN — LISINOPRIL 40 MG: 20 TABLET ORAL at 08:54

## 2021-07-08 RX ADMIN — Medication 10 ML: at 22:12

## 2021-07-08 RX ADMIN — Medication 10 ML: at 06:00

## 2021-07-08 RX ADMIN — LEVETIRACETAM 1000 MG: 100 INJECTION, SOLUTION INTRAVENOUS at 22:12

## 2021-07-08 RX ADMIN — Medication 10 ML: at 16:55

## 2021-07-08 RX ADMIN — SODIUM CHLORIDE 2.5 MG/HR: 9 INJECTION, SOLUTION INTRAVENOUS at 23:43

## 2021-07-08 RX ADMIN — DEXAMETHASONE SODIUM PHOSPHATE 4 MG: 4 INJECTION, SOLUTION INTRAMUSCULAR; INTRAVENOUS at 06:00

## 2021-07-08 RX ADMIN — SODIUM CHLORIDE 75 ML/HR: 900 INJECTION, SOLUTION INTRAVENOUS at 20:23

## 2021-07-08 RX ADMIN — POTASSIUM CHLORIDE 20 MEQ: 400 INJECTION, SOLUTION INTRAVENOUS at 06:00

## 2021-07-08 RX ADMIN — BACITRACIN 1 PACKET: 500 OINTMENT TOPICAL at 19:01

## 2021-07-08 RX ADMIN — HYDRALAZINE HYDROCHLORIDE 10 MG: 20 INJECTION INTRAMUSCULAR; INTRAVENOUS at 16:54

## 2021-07-08 RX ADMIN — WATER 2 G: 1 INJECTION INTRAMUSCULAR; INTRAVENOUS; SUBCUTANEOUS at 04:00

## 2021-07-08 RX ADMIN — SODIUM CHLORIDE 7.5 MG/HR: 9 INJECTION, SOLUTION INTRAVENOUS at 22:41

## 2021-07-08 RX ADMIN — SODIUM CHLORIDE 10 MG/HR: 9 INJECTION, SOLUTION INTRAVENOUS at 22:55

## 2021-07-08 RX ADMIN — SODIUM CHLORIDE 2.5 MG/HR: 9 INJECTION, SOLUTION INTRAVENOUS at 01:58

## 2021-07-08 RX ADMIN — METOCLOPRAMIDE 5 MG: 10 TABLET ORAL at 08:53

## 2021-07-08 RX ADMIN — DEXAMETHASONE SODIUM PHOSPHATE 4 MG: 4 INJECTION, SOLUTION INTRAMUSCULAR; INTRAVENOUS at 19:01

## 2021-07-08 RX ADMIN — SODIUM CHLORIDE 7.5 MG/HR: 9 INJECTION, SOLUTION INTRAVENOUS at 23:03

## 2021-07-08 RX ADMIN — SODIUM CHLORIDE 75 ML/HR: 900 INJECTION, SOLUTION INTRAVENOUS at 04:56

## 2021-07-08 NOTE — PROGRESS NOTES
Orders received, chart reviewed and patient evaluated by occupational therapy. Pending progression with skilled acute occupational therapy, recommend:  Therapy 3 hours per day 5-7 days per week     Recommend with nursing ADLs with supervision/setup, OOB to chair 3x/day and toileting via bedpan with 2 assist. Thank you for completing as able in order to maintain patient strength, endurance and independence. Full evaluation to follow.

## 2021-07-08 NOTE — PROGRESS NOTES
Problem: Self Care Deficits Care Plan (Adult)  Goal: *Acute Goals and Plan of Care (Insert Text)  Description:   FUNCTIONAL STATUS PRIOR TO ADMISSION: Patient was modified independent using a walker for functional mobility. Per chart, patient lives in [address] [place] [address] and was able to complete ADLs. HOME SUPPORT: The patient lived alone with family to provide assistance. Occupational Therapy Goals  Initiated 7/8/2021  1. Patient will perform 2 simple grooming tasks sitting unsupported with moderate assistance within 7 day(s). 2.  Patient will perform anterior neck to thigh bathing sitting unsupported with moderate assistance within 7 day(s). 3.  Patient will perform toilet transfers with moderate assistance within 7 day(s). 4.  Patient will perform all aspects of toileting with moderate assistance within 7 day(s). 5.  Patient will participate in upper extremity therapeutic exercise/activities with minimal assistance/contact guard assist for 5 minutes within 7 day(s). Outcome: Not Met    OCCUPATIONAL THERAPY EVALUATION  Patient: Nilsa Eaton (84 y.o. female)  Date: 7/8/2021  Primary Diagnosis: Brain mass [G93.89]  Procedure(s) (LRB):  LEFT FRONTAL CRANIOTOMY WITH RESECTION INTRA CRANIAL METASTASIS WITH BRAIN LAB (URGENT) (Left) 1 Day Post-Op   Precautions:  Fall    ASSESSMENT  Based on the objective data described below, the patient presents with limited ADL performance s/p admission with hemorrhagic brain mets, now s/p L frontal craniotomy with resection of mets POD 1. Patient ADLs limited by impaired balance, generalized weakness, decreased functional activity tolerance, limited lower body access and impaired cognition (attention to task, command following, aphasia, etc). Patient is a  poor historian, reports living alone in an ILF and independence with mobility and ADLs. She responded with inconsistent accuracy to yes/no questions and followed some commands intermittently with max multimodal cues. patient completed bed mobility with maxA x2 and required min A for sitting balance. After sitting for only a few seconds, patient initiated returning herself back to supine with Nayeli x2 for safety. Patient able to move all extremities although globally weak (L stronger than R). While in long sitting on bed, attempted to have patient complete rote task of washing face - required max A to complete. She ended session with all needs met and nursing updated. Recommending IPR upon discharge pending activity tolerance and progress with acute care. Will follow    Current Level of Function Impacting Discharge (ADLs/self-care): max A for ADLs    Functional Outcome Measure: The patient scored Total: 0/100 on the Barthel Index outcome measure which is indicative of 100% impaired ability to care for basic self needs/dependency on others; inferred 100% dependency on others for instrumental ADLs. Other factors to consider for discharge: was mod I with ADLs     Patient will benefit from skilled therapy intervention to address the above noted impairments. PLAN :  Recommendations and Planned Interventions: self care training, functional mobility training, therapeutic exercise, balance training, therapeutic activities, endurance activities, neuromuscular re-education, patient education, home safety training, and family training/education    Frequency/Duration: Patient will be followed by occupational therapy 5 times a week to address goals. Recommendation for discharge: (in order for the patient to meet his/her long term goals)  Therapy 3 hours per day 5-7 days per week    This discharge recommendation:  Has been made in collaboration with the attending provider and/or case management    IF patient discharges home will need the following DME: bedside commode, hospital bed, transfer bench, and wheelchair       SUBJECTIVE:   Patient stated Issa Shrestha don't know.     OBJECTIVE DATA SUMMARY:   HISTORY:   Past Medical History: Diagnosis Date    Essential hypertension     Hypercholesterolemia     Menopause     Stroke [facility])      Past Surgical History:   Procedure Laterality Date    COLONOSCOPY N/A 2021    COLONOSCOPY performed by Skip Joe MD at [facility]  2021         HX COLONOSCOPY      HX GYN       VI,Para V,SAB i    UPPER GI ENDOSCOPY,BIOPSY  2021            Expanded or extensive additional review of patient history:     Home Situation  Home Environment: Independent living  One/Two Story Residence: One story  Living Alone: Yes  Support Systems: Family member(s)  Patient Expects to be Discharged to[de-identified] House  Current DME Used/Available at Home: Walker    Hand dominance: Right    EXAMINATION OF PERFORMANCE DEFICITS:  Cognitive/Behavioral Status:  Neurologic State: Alert;Lethargic (intermittently lethargic)  Orientation Level: Oriented to person;Oriented to place; Disoriented to situation;Disoriented to time  Cognition: Decreased attention/concentration;Decreased command following  Perception: Cues to maintain midline in sitting; Tactile;Verbal  Perseveration: No perseveration noted  Safety/Judgement: Decreased awareness of environment;Decreased awareness of need for assistance;Decreased awareness of need for safety;Decreased insight into deficits; Fall prevention    Skin: appears grossly intact    Edema: none noted in BUEs    Hearing: Auditory  Auditory Impairment: None    Vision/Perceptual:    Tracking: Unable to test secondary due to decreased visual attention                                Range of Motion:  In BUEs  AROM: Grossly decreased, non-functional  PROM: Within functional limits                      Strength:   In BUEs  Strength: Grossly decreased, non-functional                Coordination:  Coordination: Grossly decreased, non-functional  Fine Motor Skills-Upper: Left Impaired;Right Impaired    Gross Motor Skills-Upper: Left Impaired;Right Impaired    Tone & Sensation:  In BUEs  Tone: Normal  Sensation:  (unable to formally assess)                      Balance:  Sitting: Impaired; With support  Sitting - Static: Poor (constant support)    Functional Mobility and Transfers for ADLs:  Bed Mobility:  Supine to Sit: Maximum assistance;Assist x2  Sit to Supine: Minimum assistance;Assist x2  Scooting: Maximum assistance;Assist x2    Transfers:  Sit to Stand:  (NT 2* poor sitting balance)    ADL Assessment:  Feeding: Maximum assistance    Oral Facial Hygiene/Grooming: Maximum assistance    Bathing: Total assistance    Upper Body Dressing: Maximum assistance    Lower Body Dressing: Total assistance    Toileting: Total assistance    ADL Intervention and task modifications:     Grooming  Position Performed: Long sitting on bed  Washing Face: Maximum assistance  Cues: Physical assistance;Verbal cues provided    Cognitive Retraining  Safety/Judgement: Decreased awareness of environment;Decreased awareness of need for assistance;Decreased awareness of need for safety;Decreased insight into deficits; Fall prevention    Functional Measure:  Barthel Index:    Bathin  Bladder: 0  Bowels: 0  Groomin  Dressin  Feedin  Mobility: 0  Stairs: 0  Toilet Use: 0  Transfer (Bed to Chair and Back): 0  Total: 0/100        The Barthel ADL Index: Guidelines  1. The index should be used as a record of what a patient does, not as a record of what a patient could do. 2. The main aim is to establish degree of independence from any help, physical or verbal, however minor and for whatever reason. 3. The need for supervision renders the patient not independent. 4. A patient's performance should be established using the best available evidence. Asking the patient, friends/relatives and nurses are the usual sources, but direct observation and common sense are also important. However direct testing is not needed.   5. Usually the patient's performance over the preceding 24-48 hours is important, but occasionally longer periods will be relevant. 6. Middle categories imply that the patient supplies over 50 per cent of the effort. 7. Use of aids to be independent is allowed. Harrie Bodily., Barthel, D.W. (4836). Functional evaluation: the Barthel Index. [address] [facility] (14)2. Dee Taylorstefanie FADI Reynolds, Bora Mendoza., [employer] [place], [address] (1999). Measuring the change indisability after inpatient rehabilitation; comparison of the responsiveness of the Barthel Index and Functional [place] Measure. Journal of Neurology, Neurosurgery, and Psychiatry, 66(4), 609-797. Lenin Fleming, N.J.A, BHAVYA Ruiz, & Heidi Frias M.A. (2004.) Assessment of post-stroke quality of life in cost-effectiveness studies: The usefulness of the Barthel Index and the EuroQoL-5D. Quality of Life Research, 15, 892-69     Occupational Therapy Evaluation Charge Determination   History Examination Decision-Making   LOW Complexity : Brief history review  MEDIUM Complexity : 3-5 performance deficits relating to physical, cognitive , or psychosocial skils that result in activity limitations and / or participation restrictions HIGH Complexity : Patient presents with comorbidities that affect occupational performance. Signifigant modification of tasks or assistance (eg, physical or verbal) with assessment (s) is necessary to enable patient to complete evaluation       Based on the above components, the patient evaluation is determined to be of the following complexity level: MEDIUM  Pain Rating:  Did not report any pain    Activity Tolerance:   Fair and Poor    After treatment patient left in no apparent distress:    Supine in bed, Call bell within reach, Side rails x 3, and RN aware    COMMUNICATION/EDUCATION:   The patients plan of care was discussed with: Physical therapist and Registered nurse. Home safety education was provided and the patient/caregiver indicated understanding.  and Patient/family have participated as able in goal setting and plan of care. This patients plan of care is appropriate for delegation to [facility].     Thank you for this referral.  Veronica Haque OT  Time Calculation: 15 mins

## 2021-07-08 NOTE — PROGRESS NOTES
[facility]    ICU TEAM Progress Note    Name: Maria Victoria Mitchell   : 1932   MRN: [id number]   Date: 2021      Assessment/Plan:     1. 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 Concerning for metastatic disease versus malignant melanoma  b. Neurosurgery following- Dr. June Maddox performed biopsy  c. Continue Decadron  d. Continue Keppra  e. SBP < 140, cardene as needed  f. Oncology and radiation oncology following  g. CT C/A/P without evidence of primary cancer except for 4mm lung nodule  2. Hypertension  a. Continue lisinopril and atenolol  3. Dyslipidemia  a. Continue atorvastatin  4. History of stroke  a. Plavix on hold      F - Feeding:  Pending Speech  A - Analgesia: Norco  S - Sedation: None  T - DVT Prophylaxis: SCD's or Sequential Compression Device   H - Head of Bed: > 30 Degrees  U - Ulcer Prophylaxis: Protonix (pantoprazole)   G - Glycemic Control: Insulin  S - Spontaneous Breathing Trial: N/A  B - Bowel Regimen: None needed at this time  I - Indwelling Catheter:   Tubes: None  Lines: Peripheral IV and Arterial Line     Drains: None  D - De-escalation of Antibiotics: Ancef    Subjective:   Progress Note: 2021      Reason for ICU Admission: Post-op monitoring    HPI: As per HPI : Consuelo Linder is a 80 y.o. female with a past medical history of hypertension, dyslipidemia, and prior stroke who presents at the request of Dr. Diggs for new hemorrhagic brain lesions concerning for metastatic process.     POD:  Day of Surgery    S/P:   Procedure(s):  LEFT FRONTAL CRANIOTOMY WITH RESECTION INTRA CRANIAL METASTASIS WITH BRAIN LAB (URGENT)    Active Problem List:     Problem List  Date Reviewed: 2021        Codes Class    Brain mass ICD-10-CM: G93.89  ICD-9-CM: 348.89         Stenosis of both internal carotid arteries ICD-10-CM: I65.23  ICD-9-CM: 433.10, 433.30         Cerebral microvascular disease ICD-10-CM: I67.89  ICD-9-CM: 437.8 HTN (hypertension) ICD-10-CM: I10  ICD-9-CM: 401.9         Hyperlipidemia ICD-10-CM: E78.5  ICD-9-CM: 272.4         Menopausal state ICD-10-CM: N95.1  ICD-9-CM: 627.2         Other and unspecified hyperlipidemia ICD-10-CM: E78.5  ICD-9-CM: 272.4         OA (osteoarthritis) ICD-10-CM: M19.90  ICD-9-CM: 715.90         [facility] ICD-10-CM: I63.9  ICD-9-CM: 434.91               Past Medical History:      has a past medical history of Essential hypertension, Hypercholesterolemia, Menopause, and Stroke ([facility] (2011). She also has no past medical history of Endocrine disorder. Past Surgical History:      has a past surgical history that includes hx gyn; hx colonoscopy (2010); colonoscopy,remv olamide,snare (6/14/2021); colonoscopy (N/A, 6/14/2021); and upper gi endoscopy,biopsy (6/17/2021). Home Medications:     Prior to Admission medications    Medication Sig Start Date End Date Taking? Authorizing Provider   atenoloL (TENORMIN) 25 mg tablet Take 25 mg by mouth daily. Yes Provider, Historical   lisinopriL (PRINIVIL, ZESTRIL) 40 mg tablet Take 20 mg by mouth daily. Yes Provider, Historical   pantoprazole (PROTONIX) 40 mg tablet Take 40 mg by mouth Daily (before breakfast). Yes Provider, Historical   metoclopramide HCl (REGLAN) 5 mg tablet Take 5 mg by mouth two (2) times a day. Yes Provider, Historical   ondansetron hcl (Zofran) 4 mg tablet Take 8 mg by mouth every eight (8) hours as needed for Nausea or Vomiting. Yes Provider, Historical   clopidogreL (Plavix) 75 mg tab Take 75 mg by mouth daily. Yes Provider, Historical   atorvastatin (LIPITOR) 20 mg tablet TAKE 1 TABLET NIGHTLY 8/23/19  Yes Jetta Brunner, NP   multivitamin (ONE A DAY) tablet Take 1 Tablet by mouth daily. Yes Provider, Historical       Allergies/Social/Family History: Allergies   Allergen Reactions    Statins-Hmg-Coa Reductase Inhibitors Other (comments)     Other reaction(s):  Other (comments)  Severs leg pain(Zetia, Crestor)  Severs leg pain(Zetia, Crestor)      Social History     Tobacco Use    Smoking status: Former Smoker     Packs/day: 0.25     Years: 25.00     Pack years: 6.25     Types: Cigarettes     Start date:      Quit date: 1990     Years since quittin.8    Smokeless tobacco: Never Used   Substance Use Topics    Alcohol use: Not Currently     Comment: stopped May 2021      Family History   Problem Relation Age of Onset    Parkinson's Disease Mother     Cancer Father         Gastric       Objective:   Vital Signs:  Visit Vitals  /74   Pulse 75   Temp 97.6 °F (36.4 °C)   Resp 19   Ht 5' 4\" (1.626 m)   Wt 56.7 kg (125 lb)   SpO2 97%   BMI 21.46 kg/m²    O2 Flow Rate (L/min): 2 l/min O2 Device: Nasal cannula Temp (24hrs), Av.6 °F (36.4 °C), Min:97.4 °F (36.3 °C), Max:98.2 °F (36.8 °C)           Intake/Output:     Intake/Output Summary (Last 24 hours) at 2021 2241  Last data filed at 2021 2200  Gross per 24 hour   Intake 3510.83 ml   Output 2180 ml   Net 1330.83 ml       Physical Exam:    General:  Alert, cooperative, well noursished, well developed, appears stated age   Eyes:  Sclera anicteric. Pupils equally round and reactive to light. Mouth/Throat: Mucous membranes normal, oral pharynx clear   Neck: Supple   Lungs:   Clear to auscultation bilaterally, good effort   CV:  Regular rate and rhythm,no murmur, click, rub or gallop   Abdomen:   Soft, non-tender.  bowel sounds normal. non-distended   Extremities: No cyanosis or edema   Skin: Skin color, texture, turgor normal. no acute rash or lesions   Lymph nodes: Cervical and supraclavicular normal   Musculoskeletal: No swelling or deformity   Lines/Devices:  Intact, no erythema, drainage or tenderness   Neuro/Psych: Alert, oriented with occasional confusion, appears to have some word finding difficulty, Lit with equal strength, follows commands       LABS AND  DATA: Personally reviewed  Recent Labs     21  0044   WBC 7.2   HGB 12.8   HCT 38.1        Recent Labs     07/05/21  0044      K 4.1      CO2 24   BUN 23*   CREA 0.75   *   CA 9.0     No results for input(s): AP, TBIL, TP, ALB, GLOB, AML, LPSE in the last 72 hours. No lab exists for component: SGOT, GPT, AMYP  No results for input(s): INR, PTP, APTT, INREXT in the last 72 hours. No results for input(s): PHI, PCO2I, PO2I, FIO2I in the last 72 hours. MEDS: Reviewed    Chest X-Ray:  CXR Results  (Last 48 hours)               07/07/21 1428  XR CHEST PORT Final result    Impression:  1. Interval placement of right internal jugular central venous catheter. 2.  No evidence of pneumothorax. Narrative:  STUDY: Portable semierect AP radiograph       INDICATION: Central line placement       COMPARISON: Chest radiograph from 7/6/2021 0020 hours       FINDINGS:        Portable AP semierect radiograph obtained. There is a new right internal jugular approach central venous catheter with tip   terminating in the lower superior vena cava. Normal cardiac silhouette with no significant changes compared to prior study. The lungs are underinflated. There is right hemidiaphragmatic elevation. No   focal consolidations, pleural effusions, or pneumothorax. 07/06/21 0052  XR CHEST PORT Final result    Impression:  No acute process. Narrative:  INDICATION: Witnessed fall now with sternal chest pain       EXAM:  AP CHEST RADIOGRAPH       COMPARISON: July 3, 2021       FINDINGS:       AP portable view of the chest demonstrates a normal cardiomediastinal   silhouette. There is no edema, effusion, consolidation, or pneumothorax. Right   basilar atelectasis. No osseous abnormality. CT Results  (Last 48 hours)               07/08/21 0141  CT HEAD WO CONT Final result    Impression:      Interval resection superior left frontal lobe lesion with small amount of   pneumocephalus and hemorrhage at the resection site.  No mass effect/edema. No   midline shift. Chronic white matter disease, other hyperdense metastases, and   ventriculomegaly all unchanged. Narrative:  INDICATION: assess tumor resection       EXAM:  HEAD CT WITHOUT CONTRAST       COMPARISON: July 7, 2021       TECHNIQUE:  Routine noncontrast axial head CT was performed. Sagittal and   coronal reconstructions were generated. CT dose reduction was achieved through use of a standardized protocol tailored   for this examination and automatic exposure control for dose modulation. FINDINGS:       There is been interval left frontal craniotomy, with a small amount of   pneumocephalus and blood in the superior left frontal lobe at site of previous   enhancing mass. Several other hyperdense lesions particularly in the   parasagittal right frontal lobe and vermis have not changed significantly. There   is no midline shift. Ventriculomegaly is unchanged. Chronic periventricular   white matter hypodensities have not changed significantly. Paranasal sinuses and   mastoid air cells are clear. 07/07/21 1030  CT HEAD W CONT Final result    Impression:  Preoperative enhanced head CT performed with brain lab protocol. Narrative:  EXAM: CT HEAD W CONT       INDICATION: surgical planning       COMPARISON: 7/5/2021. CONTRAST: 100 mL of Isovue-300. TECHNIQUE: Enhanced head CT performed with BrainLab protocol and fiducial   markers in place in the axial plane with sagittal and coronal reformats   provided. . CT dose reduction was achieved through use of a standardized protocol   tailored for this examination and automatic exposure control for dose   modulation. FINDINGS:   Enhancing masses/metastases remain without apparent change.                      Multidisciplinary Rounds Completed:  Pending    ABCDEF Bundle/Checklist Completed:  Yes    SPECIAL EQUIPMENT  None    DISPOSITION  Stay in ICU    CRITICAL CARE CONSULTANT NOTE  I had a face to face encounter with the patient, reviewed and interpreted patient data including clinical events, labs, images, vital signs, I/O's, and examined patient. I have discussed the case and the plan and management of the patient's care with the consulting services, the bedside nurses and the respiratory therapist.      NOTE OF PERSONAL INVOLVEMENT IN CARE   This patient has a high probability of imminent, clinically significant deterioration, which requires the highest level of preparedness to intervene urgently. I participated in the decision-making and personally managed or directed the management of the following life and organ supporting interventions that required my frequent assessment to treat or prevent imminent deterioration. I personally spent 30 minutes of critical care time. This is time spent at this critically ill patient's bedside actively involved in patient care as well as the coordination of care and discussions with the patient's family. This does not include any procedural time which has been billed separately.     Anastacio Moore [facility]  7/7/2021

## 2021-07-08 NOTE — PROGRESS NOTES
Problem: Dysphagia (Adult)  Goal: *Acute Goals and Plan of Care (Insert Text)  Description: Speech Therapy Goals  Initiated 7/8/2021    1. Patient will tolerate pureed diet/thin liquids without adverse effects within 7 days. Outcome: Progressing Towards Goal     Problem: Communication Impaired (Adult)  Goal: *Acute Goals and Plan of Care (Insert Text)  Description: Speech Therapy Goals  Initiated 7/8/2021    1. Patient will participate in automatic speech tasks with 80% accuracy within 7 days. 2. Patient will participate in simple object naming tasks with 80% accuracy within 7 days. Outcome: Progressing Towards Goal       SPEECH LANGUAGE PATHOLOGY BEDSIDE SWALLOW AND SPEECH EVALUATIONS  Patient: Miri Chappell (80 y.o. female)  Date: 7/8/2021  Primary Diagnosis: Brain mass [G93.89]  Procedure(s) (LRB):  LEFT FRONTAL CRANIOTOMY WITH RESECTION INTRA CRANIAL METASTASIS WITH BRAIN LAB (URGENT) (Left) 1 Day Post-Op   Precautions:        ASSESSMENT :  Pt noted to be swallowing solid consistencies whole without attempts to masticate. Besides this, pt without any overt s/s of aspiration nor difficulty with puree and thus recommend pt initiate diet as outlined below. Pt presents with moderate expressive-receptive aphasia characterized by perseveration on \"no\", phonemic, verbal, and neologistic paraphasias, anomia, and reduced receptive language. Pt language presentation most consistent with a broca's aphasia on this date. Discussed this with pt's grandson in-law who expressed concern that SLP had not diagnosed this sooner. Explained that SLP just consulted today and therefore could not have made a diagnosis before today. Grandson-in-law expressed understanding. Pt will continue to benefit from continued SLP at this and the next level of care. Patient will benefit from skilled intervention to address the above impairments.   Patients rehabilitation potential is considered to be Guarded     PLAN :  Recommendations and Planned Interventions:  --Pureed diet/thin liquids  --Meds in puree  --general aspiration precautions  --gestures and visualizations for directions  --simple yes/no questions for communication    Frequency/Duration: Patient will be followed by speech-language pathology 3 times a week to address goals. Discharge Recommendations: To Be Determined     SUBJECTIVE:   Patient stated, Mir Srivastava! I can't!. OBJECTIVE:     Past Medical History:   Diagnosis Date    Essential hypertension     Hypercholesterolemia     Menopause     Stroke [facility])      Past Surgical History:   Procedure Laterality Date    COLONOSCOPY N/A 2021    COLONOSCOPY performed by Shasta Matute MD at [address] [place] [address],[address]  2021         HX COLONOSCOPY      HX GYN       VI,Para V,SAB i    UPPER GI ENDOSCOPY,BIOPSY  2021          Prior Level of Function/Home Situation:   Home Situation  Home Environment: Independent living ([facility])  One/Two Story Residence: One story  Living Alone: Yes  Support Systems: Family member(s)  Patient Expects to be Discharged to[de-identified] House  Current DME Used/Available at Home: [address] prior to admission: regular diet/thin liquids  Current Diet:  regular diet/thin liquids   Cognitive and Communication Status:  Neurologic State: Alert  Orientation Level:  (unable to assess 2/2 aphasia)  Cognition: Decreased command following           Swallowing Evaluation:   Oral Assessment:  Oral Assessment  Labial: No impairment  Oral Hygiene: oral mucosa moist and clear of secretions  P.O. Trials:  Patient Position: upright in bed  Vocal quality prior to P.O.: No impairment  Consistency Presented:  Thin liquid;Puree  How Presented: SLP-fed/presented;Spoon;Straw;Successive swallows     Bolus Acceptance: No impairment  Bolus Formation/Control: Impaired  Type of Impairment: Delayed (reportedly not masticating solids)  Propulsion: No impairment  Oral Residue: None  Initiation of Swallow: No impairment  Laryngeal Elevation: Functional  Aspiration Signs/Symptoms: None  Pharyngeal Phase Characteristics: No impairment, issues, or problems              Oral Phase Severity: Mild-moderate  Pharyngeal Phase Severity : No impairment    NOMS:   The NOMS functional outcome measure was used to quantify this patient's level of swallowing impairment. Based on the NOMS, the patient was determined to be at level 4 for swallow function       NOMS Swallowing Levels:  Level 1 (CN): NPO  Level 2 (CM): NPO but takes consistency in therapy  Level 3 (CL): Takes less than 50% of nutrition p.o. and continues with nonoral feedings; and/or safe with mod cues; and/or max diet restriction  Level 4 (CK): Safe swallow but needs mod cues; and/or mod diet restriction; and/or still requires some nonoral feeding/supplements  Level 5 (CJ): Safe swallow with min diet restriction; and/or needs min cues  Level 6 (CI): Independent with p.o.; rare cues; usually self cues; may need to avoid some foods or needs extra time  Level 7 ([address] [place] [address]): Independent for all p.o.  SUNIL. (2003). National Outcomes Measurement System (NOMS): Adult Speech-Language Pathology User's Guide. Speech/Language Evaluation  Motor Speech:  Oral-Motor Structure/Motor Speech  Labial: No impairment  Oral Hygiene: oral mucosa moist and clear of secretions  Language Comprehension and Expression:  Auditory Comprehension  Auditory Impairment: Yes  Response to Basic Yes/No Questions (%): 50 %  One-Step Basic Commands (%): 50 %  Verbal Expression  Primary Mode of Expression: Verbal  Initiation: Impaired (%)  Automatic Speech Task: Impaired (comment)  Automatic speech task cueing type: Multi modality;Repetition  Repetition: Impaired  Naming: Impaired  Objects (%): 0 %  Conversation: Non-fluent  Speech Characteristics: Perseveration;Paraphasias; Word retrieval;Neologisms      NOMS:   The NOMS functional outcome measure was used to quantify this patient's level of expressive language impairment. Based on the NOMS, the patient was determined to be at level 2 for spoken language expression. NOMS Spoken Language Expression:  Level 1 (CN): Verbalizations not meaningful to anyone. Level 2 (CM): Few attempts accurate/appropriate. Max cues to elicit automatic/imitative words/phrases. Level 3 (CL): Communication partner responsible for communication; Mod cues for words/phrases meaningful in context  Level 4 (CK): Initiate during simple, routine activities w/familiar partner. Mod cues to produce simple sentences  Level 5 (Chelo Lio): Initiates communication with familiar and unfamiliar partners. Min cues for complex sentences. Level 6 (CI): Communicates for most activities. Some limitations still present for vocational/social activities. Rarely cued for complex info  Level 7 [facility]): Independent communication. SUNIL. (2003). National Outcomes Measurement System (NOMS): Adult Speech-Language Pathology User's Guide. Pain:  Pain Scale 1: FACES  Pain Intensity 1: 0       After treatment:   Call bell within reach and Nursing notified    COMMUNICATION/EDUCATION:     The patient's plan of care including recommendations, planned interventions, and recommended diet changes were discussed with: Registered nurse and NP . Patient is unable to participate in goal setting and plan of care.     Thank you for this referral.  CATALINA Jones  Time Calculation: 18 mins

## 2021-07-08 NOTE — PROGRESS NOTES
Physical Therapy:    Orders received, chart reviewed and patient evaluated by physical therapy. Pending progression with skilled acute physical therapy, recommend:  Therapy 3 hours per day 5-7 days per week pending progress in acute care. Full evaluation to follow.       Jelly Garcia, PT, DPT

## 2021-07-08 NOTE — PROGRESS NOTES
Attempted visit for [facility] patient. Patient unavailable at this time. Spoke with family.     Edie Mcqueen

## 2021-07-08 NOTE — OP NOTES
[address]  OPERATIVE REPORT    Name:  Amanda Guerrero  MR#:  [id number]  :  1932  ACCOUNT #:  [de-identified]  DATE OF SERVICE:  2021    PREOPERATIVE DIAGNOSIS:  Brain tumor. POSTOPERATIVE DIAGNOSIS:  Brain tumor. PROCEDURE PERFORMED:   Left frontal craniotomy for resection of brain tumor. SURGEON:  Dean Rm MD    ASSISTANT:  OR staff, first assistant. ANESTHESIA:  General endotracheal.    COMPLICATIONS:  No complications. IMPLANTS:  n    ESTIMATED BLOOD LOSS:  Less than 50. INDICATIONS:  The patient is an 59-year-old female who was found to have 15 mets in her brain. She does not have any known primary, the family wants to be aggressive, so we decided to biopsy surface 1. FINDINGS:  Dark black lesion, first total resection of that one.    2 g of Ancef were given prior to the incision and orders were written to stop within 24 hours. SCDs were used for the entire case. PROCEDURE:  The patient was brought to the operating room. After appropriate anesthesia was administered, she was placed in [place] headholder and all pressure points were adequately padded. Shaun Sharp was brought in and fiducials were used to register. This was then used to kevon out any incision over the lesion. This was infiltrated with 1% lidocaine with 1:100,000 epinephrine. An incision was made using #10 blade and Bovie electrocautery was used to cut through the subcutaneous tissue. A self-retaining retractor was placed in the wound. A ken hole was made followed by circular bone flap using a wire pass drill. This was then elevated. This was protected for the case. The dura was opened from front to back based  on back corner. Beneath this, there was obvious lesion which was darker than the surrounding brain. BrainLAB was then used to map it out around the vessel that was there. A 15 blade was used to come to the arachnoid and came into a very dark black lesion.   The specimen was sent for frozen specimen which came back as high-grade metastatic disease with highly pigmented. At this point, I was able to resect the rest of the lesion. This was all sent as one specimen. Hemostasis was obtained. Surgicel was placed into the bed. The dura was closed, though it would not close all the way because it was slightly raggedy, I put a piece of Gelfoam over the remaining part. The bone flap was replaced using a mini plate. Scalp was irrigated copiously, closed in anatomical layers. The skin was reapproximated using nylon. Sponge and needle counts were correct x2. Dr. Marian Brenner was present for the entire case from start to finish.       Venus Ohara MD [id number]  D:  07/07/2021 13:54  T:  07/07/2021 22:46  JOB #:  [id number]

## 2021-07-08 NOTE — PROGRESS NOTES
[facility]    ICU TEAM Progress Note    Name: Sita Vidal   : 1932   MRN: [id number]   Date: 2021      Assessment/Plan:     1. 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 S/p crani w/ biopsy, , Dr. Francisco Mckeon  b. Concerning for malignant melanoma vs mets of other primary  c. Await path  d. Decadron  eOksana Keppra  f. Goal SBP < 140 - Cardene gtt  g. Neuro exam change o/n; f/u CT unchanged  2. HTN  a. Home lisinopril, atenolol  3. HLD  a. statin  4. CVA hx  a. Plavix held  5. COVID: low suspicion  6. SBP Goal of: > 90 mmHg and < 140 mmHg  7. MAP Goal of: > 65 mmHg  8. IVFs: prn  9. Transfusion Trigger (Hgb): <7 g/dL  10. Respiratory Goals:  a. Chlorhexidine   b. Incentive spirometry  11. Pulmonary toilet: Incentive Spirometry   12. SpO2 Goal: > 92%  13. Keep K>4; Mg>2   14. PT/OT: PT consulted and on board, OT consulted and on board and Speech therapy consulted and on board   15. Discussed Plan of Care/Code Status: Full Code  16. Discussed Care Plan with Bedside RN  17. Documentation of Current Medications  18. Further as below:    F - Feeding:  Pending SLP eval  A - Analgesia: Oxycodone and Acetaminophen  S - Sedation: N/A  T - DVT Prophylaxis: SCD's or Sequential Compression Device   H - Head of Bed: > 30 Degrees  U - Ulcer Prophylaxis: Protonix (pantoprazole)   G - Glycemic Control: Insulin  S - Spontaneous Breathing Trial: N/A  B - Bowel Regimen: None needed at this time  I - Indwelling Catheter:   Tubes: None  Lines: Peripheral IV and Arterial Line  Drains: [place]aco Catheter  D - De-escalation of Antibiotics: as above    Multidisciplinary Rounds Completed:   Yes    ABCDEF Bundle/Checklist Completed:  Yes    SPECIAL EQUIPMENT  None    DISPOSITION  Transfer to non-ICU bed when ok per NSurg    Subjective:   Progress Note: 2021      Reason for ICU Admission: post-op crani for brain mets     HPI: Consuelo WENDI Linder is a 89 y.o. female with a past medical history of hypertension, dyslipidemia, and prior stroke who presents at the request of Dr. Diggs for new hemorrhagic brain lesions concerning for metastatic process. Overnight Events: No major o/n events reported. Intermittent confusion. Word finding difficulty and perceived limb weakness. F/u CT head w/ expected post-op changed, but no other acute findings. Wakes to loud voice this am. Only says \"fine\" and \"yes\" to all questions. POD:  1 Day Post-Op    S/P:   Procedure(s):  LEFT FRONTAL CRANIOTOMY WITH RESECTION INTRA CRANIAL METASTASIS WITH BRAIN LAB (URGENT)    Active Problem List:     Problem List  Date Reviewed: 7/2/2021        Codes Class    Brain mass ICD-10-CM: G93.89  ICD-9-CM: 348.89         Stenosis of both internal carotid arteries ICD-10-CM: I65.23  ICD-9-CM: 433.10, 433.30         Cerebral microvascular disease ICD-10-CM: I67.89  ICD-9-CM: 437.8         HTN (hypertension) ICD-10-CM: I10  ICD-9-CM: 401.9         Hyperlipidemia ICD-10-CM: E78.5  ICD-9-CM: 272.4         Menopausal state ICD-10-CM: N95.1  ICD-9-CM: 627.2         Other and unspecified hyperlipidemia ICD-10-CM: E78.5  ICD-9-CM: 272.4         OA (osteoarthritis) ICD-10-CM: M19.90  ICD-9-CM: 715.90         Stroke ([facility] ICD-10-CM: I63.9  ICD-9-CM: 434.91               Past Medical History:      has a past medical history of Essential hypertension, Hypercholesterolemia, Menopause, and Stroke ([facility] (2011). She also has no past medical history of Endocrine disorder. Past Surgical History:      has a past surgical history that includes hx gyn; hx colonoscopy (2010); colonoscopy,alexandre manuel (6/14/2021); colonoscopy (N/A, 6/14/2021); and upper gi endoscopy,biopsy (6/17/2021). Home Medications:     Prior to Admission medications    Medication Sig Start Date End Date Taking? Authorizing Provider   atenoloL (TENORMIN) 25 mg tablet Take 25 mg by mouth daily.    Yes Provider, Historical   lisinopriL (PRINIVIL, ZESTRIL) 40 mg tablet Take 20 mg by mouth daily. Yes Provider, Historical   pantoprazole (PROTONIX) 40 mg tablet Take 40 mg by mouth Daily (before breakfast). Yes Provider, Historical   metoclopramide HCl (REGLAN) 5 mg tablet Take 5 mg by mouth two (2) times a day. Yes Provider, Historical   ondansetron hcl (Zofran) 4 mg tablet Take 8 mg by mouth every eight (8) hours as needed for Nausea or Vomiting. Yes Provider, Historical   clopidogreL (Plavix) 75 mg tab Take 75 mg by mouth daily. Yes Provider, Historical   atorvastatin (LIPITOR) 20 mg tablet TAKE 1 TABLET NIGHTLY 19  Yes Srinivas Sanders NP   multivitamin (ONE A DAY) tablet Take 1 Tablet by mouth daily. Yes Provider, Historical       Allergies/Social/Family History: Allergies   Allergen Reactions    Statins-Hmg-Coa Reductase Inhibitors Other (comments)     Other reaction(s): Other (comments)  Severs leg pain(Zetia, Crestor)  Severs leg pain(Zetia, Crestor)      Social History     Tobacco Use    Smoking status: Former Smoker     Packs/day: 0.25     Years: 25.00     Pack years: 6.25     Types: Cigarettes     Start date:      Quit date: 1990     Years since quittin.8    Smokeless tobacco: Never Used   Substance Use Topics    Alcohol use: Not Currently     Comment: stopped May 2021      Family History   Problem Relation Age of Onset    Parkinson's Disease Mother     Cancer Father         Gastric       Review of Systems:     Review of systems not obtained due to patient factors.     Objective:   Vital Signs:  Visit Vitals  /82   Pulse 76   Temp 97.9 °F (36.6 °C)   Resp 16   Ht 5' 4\" (1.626 m)   Wt 56.7 kg (125 lb)   SpO2 93%   BMI 21.46 kg/m²    O2 Flow Rate (L/min): 2 l/min O2 Device: Nasal cannula Temp (24hrs), Av.7 °F (36.5 °C), Min:97.4 °F (36.3 °C), Max:98.2 °F (36.8 °C)           Intake/Output:     Intake/Output Summary (Last 24 hours) at 2021 0746  Last data filed at 2021 0700  Gross per 24 hour   Intake 4550 ml   Output 3315 ml   Net 1235 ml       Physical Exam:    General:  NAD, cooperative, well noursished, well developed, appears stated age   Eyes:  Sclera anicteric. Pupils equally round and reactive to light. Mouth/Throat: Mucous membranes normal, oral pharynx clear   Neck: Supple   Lungs:   Clear to auscultation bilaterally, good effort   CV:  Regular rate and rhythm,no murmur, click, rub or gallop   Abdomen:   Soft, non-tender. bowel sounds normal. non-distended   Extremities: No cyanosis or edema   Skin: Skin color, texture, turgor normal. no acute rash or lesions   Lymph nodes: Cervical and supraclavicular normal   Musculoskeletal: No swelling or deformity   Lines/Devices:  Intact, no erythema, drainage or tenderness   Neuro/Psych: Sleeping, wakes to alert, word finding difficulty, BARCENAS, symmetrical, nonfocal o/w       LABS AND  DATA: Personally reviewed  Recent Labs     07/08/21  0241   WBC 10.0   HGB 13.7   HCT 40.3        Recent Labs     07/08/21  0241      K 3.4*      CO2 25   BUN 12   CREA 0.52*   *   CA 8.5     No results for input(s): AP, TBIL, TP, ALB, GLOB, AML, LPSE in the last 72 hours. No lab exists for component: SGOT, GPT, AMYP  No results for input(s): INR, PTP, APTT, INREXT in the last 72 hours. No results for input(s): PHI, PCO2I, PO2I, FIO2I in the last 72 hours. No results for input(s): CPK, CKMB, TROIQ, BNPP in the last 72 hours. Hemodynamics:   PAP:   CO:     Wedge:   CI:     CVP:    SVR:       PVR:       Ventilator Settings:  Mode Rate Tidal Volume Pressure FiO2 PEEP                    Peak airway pressure:      Minute ventilation:          MEDS: Reviewed    Chest X-Ray:  CXR Results  (Last 48 hours)               07/07/21 1428  XR CHEST PORT Final result    Impression:  1. Interval placement of right internal jugular central venous catheter. 2.  No evidence of pneumothorax.        Narrative:  STUDY: Portable semierect AP radiograph       INDICATION: Central line placement       COMPARISON: Chest radiograph from 7/6/2021 0020 hours       FINDINGS:        Portable AP semierect radiograph obtained. There is a new right internal jugular approach central venous catheter with tip   terminating in the lower superior vena cava. Normal cardiac silhouette with no significant changes compared to prior study. The lungs are underinflated. There is right hemidiaphragmatic elevation. No   focal consolidations, pleural effusions, or pneumothorax. Images:   Reviewed    ECHO:  Reviewed     CRITICAL CARE CONSULTANT NOTE  I had a face to face encounter with the patient, reviewed and interpreted patient data including clinical events, labs, images, vital signs, I/O's, and examined patient. I have discussed the case and the plan and management of the patient's care with the consulting services, the bedside nurses and the respiratory therapist.      NOTE OF PERSONAL INVOLVEMENT IN CARE   This patient has a high probability of imminent, clinically significant deterioration, which requires the highest level of preparedness to intervene urgently. I participated in the decision-making and personally managed or directed the management of the following life and organ supporting interventions that required my frequent assessment to treat or prevent imminent deterioration. I personally spent 31 minutes of critical care time. This is time spent at this critically ill patient's bedside actively involved in patient care as well as the coordination of care and discussions with the patient's family. This does not include any procedural time which has been billed separately.     Enedina Lara. Boris [facility]oya [facility] Critical Care  7/8/2021

## 2021-07-08 NOTE — PROGRESS NOTES
Problem: Mobility Impaired (Adult and Pediatric)  Goal: *Acute Goals and Plan of Care (Insert Text)  Description: FUNCTIONAL STATUS PRIOR TO ADMISSION: Pt poor historian. Reports living alone in ILF and independence with mobility and ADLs. HOME SUPPORT PRIOR TO ADMISSION: Unclear level of support available     Physical Therapy Goals  Initiated 7/8/2021  1. Patient will move from supine to sit and sit to supine , scoot up and down, and roll side to side in bed with moderate assistance  within 7 day(s). 2.  Patient will transfer from bed to chair and chair to bed with maximal assistance using the least restrictive device within 7 day(s). 3.  Patient will perform sit to stand with maximal assistance within 7 day(s). 4.  Patient will ambulate with maximal assistance for 15 feet with the least restrictive device within 7 day(s). Outcome: Not Met  PHYSICAL THERAPY EVALUATION  Patient: Ria Pineda (87 y.o. female)  Date: 7/8/2021  Primary Diagnosis: Brain mass [G93.89]  Procedure(s) (LRB):  LEFT FRONTAL CRANIOTOMY WITH RESECTION INTRA CRANIAL METASTASIS WITH BRAIN LAB (URGENT) (Left) 1 Day Post-Op   Precautions: Fall    ASSESSMENT  Based on the objective data described below, the patient presents with decline in functional mobility s/p admission with hemorrhagic brain mets, now s/p L frontal craniotomy with resection of mets POD 1. Pt poor historian, reports living alone in an ILF and independence with mobility and ADLs. She responded with inconsistent accuracy to yes/no questions and followed some commands intermittently with max prompting. Transferred supine>sit with maxA x2 and required Nayeli for sitting balance. After sitting for only a few seconds, pt initiated returning herself back to supine with Nayeli x2 for safety. Pt able to move all extremities although globally weak (L stronger than R). She ended session with all needs met and nursing updated.   Recommending IPR upon discharge pending activity tolerance and progress with acute care. Current Level of Function Impacting Discharge (mobility/balance): maxA x2 for supine>sit transfer, Nayeli x2 for sit>supine transfer    Functional Outcome Measure: The patient scored 0/56 on the Joshi outcome measure which is indicative of high fall risk. Other factors to consider for discharge: fall risk, impaired cognition, independent baseline? Patient will benefit from skilled therapy intervention to address the above noted impairments. PLAN :  Recommendations and Planned Interventions: bed mobility training, transfer training, gait training, therapeutic exercises, neuromuscular re-education, patient and family training/education, and therapeutic activities      Frequency/Duration: Patient will be followed by physical therapy:  5 times a week to address goals. Recommendation for discharge: (in order for the patient to meet his/her long term goals)  Therapy 3 hours per day 5-7 days per week pending activity tolerance and  progress in acute care     This discharge recommendation:  Has not yet been discussed the attending provider and/or case management    IF patient discharges home will need the following DME: to be determined (TBD)         SUBJECTIVE:   Patient stated I don't remember.     OBJECTIVE DATA SUMMARY:   HISTORY:    Past Medical History:   Diagnosis Date    Essential hypertension     Hypercholesterolemia     Menopause     Stroke ([facility]      Past Surgical History:   Procedure Laterality Date    COLONOSCOPY N/A 2021    COLONOSCOPY performed by Sherwin Hannah MD at [address] [place] [address]  2021         HX COLONOSCOPY      HX GYN       VI,Para V,SAB i    UPPER GI ENDOSCOPY,BIOPSY  2021          Home Situation  Home Environment: Independent living ([facility])  One/Two Story Residence: One story  Living Alone: Yes  Support Systems: Family member(s)  Patient Expects to be Discharged to[de-identified] House  Current DME Used/Available at Home: Lilibethron Yoon    EXAMINATION/PRESENTATION/DECISION MAKING:   Critical Behavior:  Neurologic State: Drowsy, Confused  Orientation Level: Oriented to person, Oriented to place, Disoriented to situation, Disoriented to time  Cognition: Follows commands     Hearing: Auditory  Auditory Impairment: None    Range Of Motion:  AROM: Grossly decreased, non-functional           PROM: Within functional limits           Strength:    Strength: Grossly decreased, non-functional                    Tone & Sensation:   Tone: Normal              Sensation:  (unable to assess due to cognition)               Coordination:  Coordination: Grossly decreased, non-functional  Vision:      Functional Mobility:  Bed Mobility:  Supine to Sit: Maximum assistance;Assist x2  Sit to Supine: Minimum assistance;Assist x2  Scooting: Maximum assistance;Assist x2    Balance:   Sitting: Impaired; With support  Sitting - Static: Poor (constant support)    Functional Measure:  Joshi Balance Test:    Sitting to Standin  Standing Unsupported: 0  Sitting with Back Unsupported: 0  Standing to Sittin  Transfers: 0  Standing Unsupported with Eyes Closed: 0  Standing Unsupported with Feet Together: 0  Reach Forward with Outstretched Arm: 0   Object: 0  Turn to Look Over Shoulders: 0  Turn 360 Degrees: 0  Alternate Foot on Step/Stool: 0  Standing Unsupported One Foot in Front: 0  Stand on One Le  Total: 0/56         56=Maximum possible score;   0-20=High fall risk  21-40=Moderate fall risk   41-56=Low fall risk        Physical Therapy Evaluation Charge Determination   History Examination Presentation Decision-Making   HIGH Complexity :3+ comorbidities / personal factors will impact the outcome/ POC  HIGH Complexity : 4+ Standardized tests and measures addressing body structure, function, activity limitation and / or participation in recreation  MEDIUM Complexity : Evolving with changing characteristics  Other outcome measures Joshi  HIGH       Based on the above components, the patient evaluation is determined to be of the following complexity level: MEDIUM    Activity Tolerance:   Poor    After treatment patient left in no apparent distress:   Supine in bed and Call bell within reach    COMMUNICATION/EDUCATION:   The patients plan of care was discussed with: Occupational therapist and Registered nurse. Patient is unable to participate in goal setting and plan of care.     Thank you for this referral.  Linsey Otoole, PT, DPT   Time Calculation: 14 mins

## 2021-07-08 NOTE — PROGRESS NOTES
Music Therapy Assessment  [address]  [phone]     1/30/1932  80 y.o.  female    Patient Telephone Number: [phone] (home)   Buddhism Affiliation: Mandaeism   Language: English   Patient Active Problem List    Diagnosis Date Noted    Brain mass 07/02/2021    Stenosis of both internal carotid arteries 07/19/2017    Cerebral microvascular disease 07/19/2017    HTN (hypertension) 11/14/2014    Hyperlipidemia 11/14/2014    Menopausal state 11/12/2013    Other and unspecified hyperlipidemia 11/12/2013    OA (osteoarthritis) 09/04/2013    Stroke ([address] 09/04/2013        Date: 7/8/2021            Total Time (in minutes): 6          [facility] INTENSIVE CARE    Mental Status:   [  ] Alert [  ] Naseem Stank [  ]  Confused  [  ] Minimally responsive  [x] Sleeping    Communication Status: [  ] Impaired Speech [  ] Nonverbal -N/A    Physical Status:   [  ] Oxygen in use  [  ] Hard of Hearing [  ] Vision Impaired  [  ] Ambulatory  [  ] Ambulatory with assistance [  ] Non-ambulatory -N/A    Music Preferences, Background: Classical music, composers like [place] and [employer]     Clinical Problem addressed: PT/family support    Goal(s) met in session: N/A: Please See Session Observations Below  Physical/Pain management (Scale of 1-10):    Pre-session rating ___________    Post-session rating __________  [  ] Increased relaxation   [  ] Affected breathing patterns  [  ] Decreased muscle tension   [  ] Decreased agitation  [  ] Affected heart rate    [  ] Increased alertness     Emotional/Psychological:  [  ] Increased self-expression   [  ] Decreased aggressive behavior   [  ] Decreased feelings of stress  [  ] Discussed healthy coping skills     [  ] Improved mood    [  ] Decreased withdrawn behavior     Social:  [  ] Decreased feelings of isolation/loneliness [  ] Positive social interaction   [  ] Provided support and/or comfort for family/friends    Spiritual:  [  ] Spiritual support    [  ] Expressed peace  [  ] Expressed hermilo    [  ] Discussed beliefs    Techniques Utilized (Check all that apply): N/A: Please See Session Observations Below  [  ] Procedural support MT [  ] Music for relaxation [  ] Patient preferred music  [  ] Bety analysis  [  ] Song choice  [  ] Music for validation  [  ] Entrainment  [  ] Movement to music [  ] Guided visualization  [  ] Rowan Janes  [  ] Patient instrument playing [  ] Franki Foster writing  [  ] Kal Loyd along   [  ] [place] [employer]  [  ] Sensory stimulation  [  ] Active Listening  [  ] Music for spiritual support [  ] Making of CDs as gifts    Session Observations:  Referred by Jesica Curry. This music therapist eligible (MTE) called the patient's (pt) son Prosper Hernadez) to learn of music preferences and offer support. Pt's son shared the pt just underwent surgery and expressed hesitancy to offer music therapy today. He mentioned his niece and her  will be at bedside today, and suggested this MTE consult with them before offering. MTE asked how the pt's son was handling the pt's current state of health. He responded saying this has been her first major health issue, and it's hard to see her go from being independent to where she is now. He shared he finds comfort in understanding what is going on, medically, because he has siblings who are doctors. He expressed gratitude for services and MTE concluded the phone call. MTE approached the pt's room and saw she was lying in bed with her eyes closed. Pt's granddaughter and grandson-in-law were at bedside and greeted this MTE. MTE shared about role and phone call with their \"Uncle Bill\". She shared the pt's music preferences and shared about the amount of family support they and the pt have received during this time. MTE wanted to respect the pt's rest and asked if they were in need of any further support before exiting. They declined and expressed gratitude for the visit.

## 2021-07-08 NOTE — PROGRESS NOTES
SLP Contact Note    SLP evaluation complete. Recommend puree/thins. Furthermore, pt with non-fluent expressive-receptive aphasia. Full note to follow.       Thank you,  FRANCISCO Chau.Ed, [id number] [facility]  Speech-Language Pathologist

## 2021-07-08 NOTE — PROGRESS NOTES
Neurosurgery Progress Note  [facility]          Admit Date: 2021   LOS: 6 days        Daily Progress Note: 2021    POD:1 Day Post-Op    S/P: Procedure(s):  LEFT FRONTAL CRANIOTOMY WITH RESECTION INTRA CRANIAL METASTASIS WITH BRAIN LAB (URGENT)    Subjective: The patient presented to Dr. Darrick Aleman oncology at [facility] after a brain MRI showed multiple brain mets of unknown origin. She had nausea and vomiting for a month and lost about 20 pounds. She was referred to Dr. Sharlene Corley after being started on steroids, but then was referred to the ER for work-up and evaluation. The patient's CT scans of the chest/abdomen/pelvis were unrevealing for other sites of disease. Due to the family's desire to be aggressive, after discussing risks, benefits, and alternatives of surgery, they decided to proceed with resection of the left frontal tumor for diagnosis. She underwent a left frontal craniotomy with Dr. Kunz Layer yesterday. Prelim path consistent with melanoma. She presents in the ICU with non-fluent expressive/receptive aphasia and some right sided weakness this morning. She does answer yes/no questions appropriately. Unable to obtain ROS due to aphasia. Objective:     Vital signs  Temp (24hrs), Av.7 °F (36.5 °C), Min:97.4 °F (36.3 °C), Max:98.3 °F (36.8 °C)   No intake/output data recorded.  1901 -  0700  In: 0640 [P.O.:480; I.V.:4070]  Out: 3315 [Urine:3265]    Visit Vitals  /83   Pulse 65   Temp 98.3 °F (36.8 °C)   Resp 17   Ht 5' 4\" (1.626 m)   Wt 56.7 kg (125 lb)   SpO2 95%   BMI 21.46 kg/m²    O2 Flow Rate (L/min): 2 l/min O2 Device: Nasal cannula     Pain control  Pain Assessment  Pain Scale 1: FLACC  Pain Intensity 1: 0    PT/OT  Gait                 Physical Exam:  Gen:NAD. Neuro: A&Ox2 with use of yes/no questions. Intermittent command following (wiggled toes on the left). Speech moderate non-fluent expressive/receptive aphasia. Affect flat  PERRL. EOMI.  Face symmetric. Tongue midline. BARCENAS spontaneously L>R. Gait deferred. Skin: Scalp dressing with dried blood on it    CT head without contrast on 07/08/21 shows interval resection superior left frontal lobe lesion with small amount of pneumocephalus and hemorrhage at the resection site. No mass effect/edema. No  midline shift. Chronic white matter disease, other hyperdense metastases, and ventriculomegaly all unchanged. 24 hour results:    Recent Results (from the past 24 hour(s))   METABOLIC PANEL, BASIC    Collection Time: 07/08/21  2:41 AM   Result Value Ref Range    Sodium 136 136 - 145 mmol/L    Potassium 3.4 (L) 3.5 - 5.1 mmol/L    Chloride 105 97 - 108 mmol/L    CO2 25 21 - 32 mmol/L    Anion gap 6 5 - 15 mmol/L    Glucose 164 (H) 65 - 100 mg/dL    BUN 12 6 - 20 MG/DL    Creatinine 0.52 (L) 0.55 - 1.02 MG/DL    BUN/Creatinine ratio 23 (H) 12 - 20      GFR est AA >60 >60 ml/min/1.73m2    GFR est non-AA >60 >60 ml/min/1.73m2    Calcium 8.5 8.5 - 10.1 MG/DL   CBC WITH AUTOMATED DIFF    Collection Time: 07/08/21  2:41 AM   Result Value Ref Range    WBC 10.0 3.6 - 11.0 K/uL    RBC 4.61 3.80 - 5.20 M/uL    HGB 13.7 11.5 - 16.0 g/dL    HCT 40.3 35.0 - 47.0 %    MCV 87.4 80.0 - 99.0 FL    MCH 29.7 26.0 - 34.0 PG    MCHC 34.0 30.0 - 36.5 g/dL    RDW 13.9 11.5 - 14.5 %    PLATELET 306 326 - 507 K/uL    MPV 9.7 8.9 - 12.9 FL    NRBC 0.0 0  WBC    ABSOLUTE NRBC 0.00 0.00 - 0.01 K/uL    NEUTROPHILS 86 (H) 32 - 75 %    LYMPHOCYTES 6 (L) 12 - 49 %    MONOCYTES 8 5 - 13 %    EOSINOPHILS 0 0 - 7 %    BASOPHILS 0 0 - 1 %    IMMATURE GRANULOCYTES 0 0.0 - 0.5 %    ABS. NEUTROPHILS 8.6 (H) 1.8 - 8.0 K/UL    ABS. LYMPHOCYTES 0.6 (L) 0.8 - 3.5 K/UL    ABS. MONOCYTES 0.8 0.0 - 1.0 K/UL    ABS. EOSINOPHILS 0.0 0.0 - 0.4 K/UL    ABS. BASOPHILS 0.0 0.0 - 0.1 K/UL    ABS. IMM.  GRANS. 0.0 0.00 - 0.04 K/UL    DF SMEAR SCANNED      RBC COMMENTS NORMOCYTIC, NORMOCHROMIC            Assessment:     Active Problems:    Brain mass (7/2/2021)        Plan:   1. Brain metastases   - s/p crani 07/07. Path pending. Prelim melanoma   - cont decadron   - cont keppra. Will increase to 1000 mg bid in case she is having cortical irritation contributing to aphasia   - PT/OT/Speech   - normalize and mobilize   - will keep in ICU for now  2. Brain compression with cerebral edema   - due to #1   - plans as above  3. Expressive/receptive aphasia   - due to #1, 2   - speech therapy  4.  HTN   - cont atenolol, lisinopril   - SBP<!40   - hydralazine/labetalol PRN   - Cardene PRN  5. GERD   - cont PPI    Activity: up with assist  DVT ppx: SCDs  Dispo: tbd    Plan d/w Dr. Martha Cheek, nurse, therapy team.       Delfin Hogan NP

## 2021-07-08 NOTE — PROGRESS NOTES
Bedside and Verbal shift change report given to [address]  (oncoming nurse) by Erma Sol  (offgoing nurse). Report included the following information SBAR, Intake/Output, Cardiac Rhythm SR and Dual Neuro Assessment       Shift Summary    Patient Rec'd drowsy but awake skin warm to touch Patient has expressive and respective aphasia at present. Unable  To state Name, birth date, place or situation at present. BARCENAS. Tolerated drinking liquid, took pills well with water without straw. Jennyfer Alfaro NP made aware  Speech consulted diet changed. Remain on Cardene at present to keep < 140 , weaning drip at ordered. Cardene off at present. Bedside and Verbal shift change report given to [address]  (oncoming nurse) by [address] (offgoing nurse). Report included the following information SBAR, Kardex, Intake/Output, Recent Results and Cardiac Rhythm SR     Assignments adjusted, report given nurse made aware of pending ordered. Xin Mustafa

## 2021-07-08 NOTE — PROGRESS NOTES
1930: Bedside, Verbal and Written shift change report given to Christa Rodriguez RN (oncoming nurse) by Niya Willis RN (offgoing nurse). Report included the following information SBAR, Kardex, ED Summary, Procedure Summary, Intake/Output, MAR, Recent Results, Cardiac Rhythm NSR, Alarm Parameters  and Dual Neuro Assessment. 0130: Patient traveled to CT without incidence. 0310: Called placed to Neuro surgeon on-call (Dr. Thalia Espinoza) about CT results and change in patient neurological status. This RN noted increased word finding difficult and increased confusion. Information was relayed to MD. Will continue to monitor patient. 0330: PRN hydralazine given for sys >140.     0400: Potassium 3.4 on AM lab draw. NP notified and orders for IV potassium received.     0600: A-line not drawing back blood and not able to get appropriate wave forms. NP Phyllis notified. Orders to D/C A-line. 0730: Bedside, Verbal and Written shift change report given to Chelsea Vidal (oncoming nurse) by Raymon Veronica RN (offgoing nurse). Report included the following information SBAR, Kardex, ED Summary, Intake/Output, MAR, Cardiac Rhythm NSR, Alarm Parameters , Quality Measures and Dual Neuro Assessment.

## 2021-07-09 LAB
ANION GAP SERPL CALC-SCNC: 7 MMOL/L (ref 5–15)
BASOPHILS # BLD: 0 K/UL (ref 0–0.1)
BASOPHILS NFR BLD: 0 % (ref 0–1)
BUN SERPL-MCNC: 16 MG/DL (ref 6–20)
BUN/CREAT SERPL: 36 (ref 12–20)
CALCIUM SERPL-MCNC: 8.6 MG/DL (ref 8.5–10.1)
CHLORIDE SERPL-SCNC: 104 MMOL/L (ref 97–108)
CO2 SERPL-SCNC: 23 MMOL/L (ref 21–32)
CREAT SERPL-MCNC: 0.44 MG/DL (ref 0.55–1.02)
DIFFERENTIAL METHOD BLD: ABNORMAL
EOSINOPHIL # BLD: 0 K/UL (ref 0–0.4)
EOSINOPHIL NFR BLD: 0 % (ref 0–7)
ERYTHROCYTE [DISTWIDTH] IN BLOOD BY AUTOMATED COUNT: 14 % (ref 11.5–14.5)
GLUCOSE SERPL-MCNC: 126 MG/DL (ref 65–100)
HCT VFR BLD AUTO: 38.2 % (ref 35–47)
HGB BLD-MCNC: 12.8 G/DL (ref 11.5–16)
IMM GRANULOCYTES # BLD AUTO: 0.1 K/UL (ref 0–0.04)
IMM GRANULOCYTES NFR BLD AUTO: 1 % (ref 0–0.5)
LYMPHOCYTES # BLD: 0.8 K/UL (ref 0.8–3.5)
LYMPHOCYTES NFR BLD: 8 % (ref 12–49)
MAGNESIUM SERPL-MCNC: 2.4 MG/DL (ref 1.6–2.4)
MCH RBC QN AUTO: 29.4 PG (ref 26–34)
MCHC RBC AUTO-ENTMCNC: 33.5 G/DL (ref 30–36.5)
MCV RBC AUTO: 87.6 FL (ref 80–99)
MONOCYTES # BLD: 0.9 K/UL (ref 0–1)
MONOCYTES NFR BLD: 9 % (ref 5–13)
NEUTS SEG # BLD: 8.1 K/UL (ref 1.8–8)
NEUTS SEG NFR BLD: 82 % (ref 32–75)
NRBC # BLD: 0 K/UL (ref 0–0.01)
NRBC BLD-RTO: 0 PER 100 WBC
PHOSPHATE SERPL-MCNC: 1.8 MG/DL (ref 2.6–4.7)
PLATELET # BLD AUTO: 154 K/UL (ref 150–400)
PMV BLD AUTO: 10.1 FL (ref 8.9–12.9)
POTASSIUM SERPL-SCNC: 3.5 MMOL/L (ref 3.5–5.1)
RBC # BLD AUTO: 4.36 M/UL (ref 3.8–5.2)
SODIUM SERPL-SCNC: 134 MMOL/L (ref 136–145)
WBC # BLD AUTO: 9.9 K/UL (ref 3.6–11)

## 2021-07-09 PROCEDURE — 99233 SBSQ HOSP IP/OBS HIGH 50: CPT | Performed by: INTERNAL MEDICINE

## 2021-07-09 PROCEDURE — 97530 THERAPEUTIC ACTIVITIES: CPT

## 2021-07-09 PROCEDURE — 74011250637 HC RX REV CODE- 250/637: Performed by: SPECIALIST

## 2021-07-09 PROCEDURE — 80048 BASIC METABOLIC PNL TOTAL CA: CPT

## 2021-07-09 PROCEDURE — 74011000250 HC RX REV CODE- 250: Performed by: INTERNAL MEDICINE

## 2021-07-09 PROCEDURE — 74011250636 HC RX REV CODE- 250/636: Performed by: NURSE PRACTITIONER

## 2021-07-09 PROCEDURE — 83735 ASSAY OF MAGNESIUM: CPT

## 2021-07-09 PROCEDURE — 95816 EEG AWAKE AND DROWSY: CPT | Performed by: PSYCHIATRY & NEUROLOGY

## 2021-07-09 PROCEDURE — 94760 N-INVAS EAR/PLS OXIMETRY 1: CPT

## 2021-07-09 PROCEDURE — 65660000000 HC RM CCU STEPDOWN

## 2021-07-09 PROCEDURE — 74011250636 HC RX REV CODE- 250/636: Performed by: INTERNAL MEDICINE

## 2021-07-09 PROCEDURE — 74011000258 HC RX REV CODE- 258: Performed by: NURSE PRACTITIONER

## 2021-07-09 PROCEDURE — 74011250636 HC RX REV CODE- 250/636: Performed by: SPECIALIST

## 2021-07-09 PROCEDURE — 74011250637 HC RX REV CODE- 250/637: Performed by: INTERNAL MEDICINE

## 2021-07-09 PROCEDURE — 92507 TX SP LANG VOICE COMM INDIV: CPT

## 2021-07-09 PROCEDURE — 92526 ORAL FUNCTION THERAPY: CPT

## 2021-07-09 PROCEDURE — 84100 ASSAY OF PHOSPHORUS: CPT

## 2021-07-09 PROCEDURE — 36415 COLL VENOUS BLD VENIPUNCTURE: CPT

## 2021-07-09 PROCEDURE — 85025 COMPLETE CBC W/AUTO DIFF WBC: CPT

## 2021-07-09 PROCEDURE — 95816 EEG AWAKE AND DROWSY: CPT | Performed by: NURSE PRACTITIONER

## 2021-07-09 RX ORDER — AMLODIPINE BESYLATE 5 MG/1
5 TABLET ORAL DAILY
Status: DISCONTINUED | OUTPATIENT
Start: 2021-07-09 | End: 2021-07-12

## 2021-07-09 RX ADMIN — POTASSIUM PHOSPHATE, MONOBASIC AND POTASSIUM PHOSPHATE, DIBASIC: 224; 236 INJECTION, SOLUTION, CONCENTRATE INTRAVENOUS at 12:10

## 2021-07-09 RX ADMIN — Medication 10 ML: at 18:48

## 2021-07-09 RX ADMIN — DEXAMETHASONE SODIUM PHOSPHATE 4 MG: 4 INJECTION, SOLUTION INTRAMUSCULAR; INTRAVENOUS at 08:19

## 2021-07-09 RX ADMIN — LISINOPRIL 40 MG: 20 TABLET ORAL at 08:19

## 2021-07-09 RX ADMIN — METOCLOPRAMIDE 5 MG: 10 TABLET ORAL at 08:20

## 2021-07-09 RX ADMIN — DEXAMETHASONE SODIUM PHOSPHATE 4 MG: 4 INJECTION, SOLUTION INTRAMUSCULAR; INTRAVENOUS at 12:07

## 2021-07-09 RX ADMIN — DOCUSATE SODIUM 100 MG: 100 CAPSULE, LIQUID FILLED ORAL at 08:20

## 2021-07-09 RX ADMIN — LEVETIRACETAM 1000 MG: 100 INJECTION, SOLUTION INTRAVENOUS at 23:14

## 2021-07-09 RX ADMIN — ATORVASTATIN CALCIUM 20 MG: 20 TABLET, FILM COATED ORAL at 08:20

## 2021-07-09 RX ADMIN — ATENOLOL 25 MG: 25 TABLET ORAL at 08:20

## 2021-07-09 RX ADMIN — Medication 20 ML: at 08:19

## 2021-07-09 RX ADMIN — DEXAMETHASONE SODIUM PHOSPHATE 4 MG: 4 INJECTION, SOLUTION INTRAMUSCULAR; INTRAVENOUS at 23:12

## 2021-07-09 RX ADMIN — AMLODIPINE BESYLATE 5 MG: 5 TABLET ORAL at 12:07

## 2021-07-09 RX ADMIN — DEXAMETHASONE SODIUM PHOSPHATE 4 MG: 4 INJECTION, SOLUTION INTRAMUSCULAR; INTRAVENOUS at 18:48

## 2021-07-09 RX ADMIN — Medication 20 ML: at 23:12

## 2021-07-09 RX ADMIN — PANTOPRAZOLE SODIUM 40 MG: 40 TABLET, DELAYED RELEASE ORAL at 08:20

## 2021-07-09 RX ADMIN — LEVETIRACETAM 1000 MG: 100 INJECTION, SOLUTION INTRAVENOUS at 12:07

## 2021-07-09 NOTE — PROGRESS NOTES
New prescription has been sent to the pharmacy. [facility]    ICU TEAM Progress Note    Name: Robert Amaral   : 1932   MRN: [id number]   Date: 2021      Assessment/Plan:     1. 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 S/p crani w/ biopsy, , Dr. Janette shah. Decadron, Keppra  c. Goal SBP < 140  d. MedOnc, RadOnc to see  e. Remains aphasic, neuro exam unchanged  f. D/w NSurg  g. D/c Cardene gtt  h. Add Norvasc  i. Q4h neuro checks  2. HTN  a. Home lisinopril, atenolol  3. HLD  a. statin  4. CVA hx  a. Plavix held  5. COVID: low suspicion  6. SBP Goal of: > 90 mmHg and < 140 mmHg  7. MAP Goal of: > 65 mmHg  8. IVFs: prn  9. Transfusion Trigger (Hgb): <7 g/dL  10. Respiratory Goals:  a. Chlorhexidine   b. Incentive spirometry  11. Pulmonary toilet: Incentive Spirometry   12. SpO2 Goal: > 92%  13. Keep K>4; Mg>2   14. PT/OT: PT consulted and on board, OT consulted and on board and Speech therapy consulted and on board   15. Discussed Plan of Care/Code Status: Full Code  16. Discussed Care Plan with Bedside RN  17. Documentation of Current Medications  18. Further as below:    F - Feeding:  Yes po as vi, SLP following  A - Analgesia: Acetaminophen  S - Sedation: N/A  T - DVT Prophylaxis: SCD's or Sequential Compression Device   H - Head of Bed: > 30 Degrees  U - Ulcer Prophylaxis: Protonix (pantoprazole) - consider d/c  G - Glycemic Control: Insulin  S - Spontaneous Breathing Trial: N/A  B - Bowel Regimen: None needed at this time  I - Indwelling Catheter:   Tubes: None  Lines: Peripheral IV and Arterial Line  Drains: [place] Catheter  D - De-escalation of Antibiotics: as above    Multidisciplinary Rounds Completed:   Yes    ABCDEF Bundle/Checklist Completed:  Yes    SPECIAL EQUIPMENT  None    DISPOSITION  Transfer to non-ICU bed    Subjective:   Progress Note: 2021      Reason for ICU Admission: post-op crani for brain mets     HPI: Consuelo WENDI Linder is a 80 y.o. female with a past medical history of hypertension, dyslipidemia, and prior stroke who presents at the request of Dr. Diggs for new hemorrhagic brain lesions concerning for metastatic process. Overnight Events: No major o/n events reported. Still only says \"fine\" and \"yes\" to all questions. POD:  1 Day Post-Op    S/P:   Procedure(s):  LEFT FRONTAL CRANIOTOMY WITH RESECTION INTRA CRANIAL METASTASIS WITH BRAIN LAB (URGENT)    Active Problem List:     Problem List  Date Reviewed: 7/2/2021        Codes Class    Brain mass ICD-10-CM: G93.89  ICD-9-CM: 348.89         Stenosis of both internal carotid arteries ICD-10-CM: I65.23  ICD-9-CM: 433.10, 433.30         Cerebral microvascular disease ICD-10-CM: I67.89  ICD-9-CM: 437.8         HTN (hypertension) ICD-10-CM: I10  ICD-9-CM: 401.9         Hyperlipidemia ICD-10-CM: E78.5  ICD-9-CM: 272.4         Menopausal state ICD-10-CM: N95.1  ICD-9-CM: 627.2         Other and unspecified hyperlipidemia ICD-10-CM: E78.5  ICD-9-CM: 272.4         OA (osteoarthritis) ICD-10-CM: M19.90  ICD-9-CM: 715.90         [facility] ICD-10-CM: I63.9  ICD-9-CM: 434.91               Past Medical History:      has a past medical history of Essential hypertension, Hypercholesterolemia, Menopause, and Stroke ([facility] (2011). She also has no past medical history of Endocrine disorder. Past Surgical History:      has a past surgical history that includes hx gyn; hx colonoscopy (2010); colonoscopy,abel quiñonez,snare (6/14/2021); colonoscopy (N/A, 6/14/2021); and upper gi endoscopy,biopsy (6/17/2021). Home Medications:     Prior to Admission medications    Medication Sig Start Date End Date Taking? Authorizing Provider   atenoloL (TENORMIN) 25 mg tablet Take 25 mg by mouth daily. Yes Provider, Historical   lisinopriL (PRINIVIL, ZESTRIL) 40 mg tablet Take 20 mg by mouth daily. Yes Provider, Historical   pantoprazole (PROTONIX) 40 mg tablet Take 40 mg by mouth Daily (before breakfast).    Yes Provider, Historical   metoclopramide HCl (REGLAN) 5 mg tablet Take 5 mg by mouth two (2) times a day. Yes Provider, Historical   ondansetron hcl (Zofran) 4 mg tablet Take 8 mg by mouth every eight (8) hours as needed for Nausea or Vomiting. Yes Provider, Historical   clopidogreL (Plavix) 75 mg tab Take 75 mg by mouth daily. Yes Provider, Historical   atorvastatin (LIPITOR) 20 mg tablet TAKE 1 TABLET NIGHTLY 19  Yes Connor Villatoro NP   multivitamin (ONE A DAY) tablet Take 1 Tablet by mouth daily. Yes Provider, Historical       Allergies/Social/Family History: Allergies   Allergen Reactions    Statins-Hmg-Coa Reductase Inhibitors Other (comments)     Other reaction(s): Other (comments)  Severs leg pain(Zetia, Crestor)  Severs leg pain(Zetia, Crestor)      Social History     Tobacco Use    Smoking status: Former Smoker     Packs/day: 0.25     Years: 25.00     Pack years: 6.25     Types: Cigarettes     Start date:      Quit date: 1990     Years since quittin.8    Smokeless tobacco: Never Used   Substance Use Topics    Alcohol use: Not Currently     Comment: stopped May 2021      Family History   Problem Relation Age of Onset    Parkinson's Disease Mother     Cancer Father         Gastric       Review of Systems:     Review of systems not obtained due to patient factors. Objective:   Vital Signs:  Visit Vitals  /82   Pulse 60   Temp 100.4 °F (38 °C)   Resp 16   Ht 5' 4\" (1.626 m)   Wt 56.7 kg (125 lb)   SpO2 95%   BMI 21.46 kg/m²    O2 Flow Rate (L/min): 2 l/min O2 Device: None (Room air) Temp (24hrs), Av.7 °F (37.1 °C), Min:97.6 °F (36.4 °C), Max:100.4 °F (38 °C)           Intake/Output:     Intake/Output Summary (Last 24 hours) at 2021 0729  Last data filed at 2021 0630  Gross per 24 hour   Intake 2071.25 ml   Output 1430 ml   Net 641.25 ml       Physical Exam:    General:  NAD, cooperative, well noursished, well developed, appears stated age   Eyes:  Sclera anicteric.  Pupils equally round and reactive to light. Mouth/Throat: Mucous membranes normal, oral pharynx clear   Neck: Supple   Lungs:   Clear to auscultation bilaterally, good effort   CV:  Regular rate and rhythm,no murmur, click, rub or gallop   Abdomen:   Soft, non-tender. bowel sounds normal. non-distended   Extremities: No cyanosis or edema   Skin: Skin color, texture, turgor normal. no acute rash or lesions   Lymph nodes: Cervical and supraclavicular normal   Musculoskeletal: No swelling or deformity   Lines/Devices:  Intact, no erythema, drainage or tenderness   Neuro/Psych: Sleeping, wakes to alert, word finding difficulty, BARCENAS, symmetrical, nonfocal o/w       LABS AND  DATA: Personally reviewed  Recent Labs     07/09/21 0419 07/08/21  0241   WBC 9.9 10.0   HGB 12.8 13.7   HCT 38.2 40.3    157     Recent Labs     07/09/21 0419 07/08/21  0241   * 136   K 3.5 3.4*    105   CO2 23 25   BUN 16 12   CREA 0.44* 0.52*   * 164*   CA 8.6 8.5   MG 2.4  --    PHOS 1.8*  --      No results for input(s): AP, TBIL, TP, ALB, GLOB, AML, LPSE in the last 72 hours. No lab exists for component: SGOT, GPT, AMYP  No results for input(s): INR, PTP, APTT, INREXT, INREXT in the last 72 hours. No results for input(s): PHI, PCO2I, PO2I, FIO2I in the last 72 hours. No results for input(s): CPK, CKMB, TROIQ, BNPP in the last 72 hours. Hemodynamics:   PAP:   CO:     Wedge:   CI:     CVP:    SVR:       PVR:       Ventilator Settings:  Mode Rate Tidal Volume Pressure FiO2 PEEP                    Peak airway pressure:      Minute ventilation:          MEDS: Reviewed    Chest X-Ray:  CXR Results  (Last 48 hours)               07/07/21 1428  XR CHEST PORT Final result    Impression:  1. Interval placement of right internal jugular central venous catheter. 2.  No evidence of pneumothorax.        Narrative:  STUDY: Portable semierect AP radiograph       INDICATION: Central line placement       COMPARISON: Chest radiograph from 7/6/2021 0020 hours       FINDINGS:        Portable AP semierect radiograph obtained. There is a new right internal jugular approach central venous catheter with tip   terminating in the lower superior vena cava. Normal cardiac silhouette with no significant changes compared to prior study. The lungs are underinflated. There is right hemidiaphragmatic elevation. No   focal consolidations, pleural effusions, or pneumothorax. Images:   Reviewed    ECHO:  Reviewed     CRITICAL CARE CONSULTANT NOTE  I had a face to face encounter with the patient, reviewed and interpreted patient data including clinical events, labs, images, vital signs, I/O's, and examined patient. I have discussed the case and the plan and management of the patient's care with the consulting services, the bedside nurses and the respiratory therapist.      NOTE OF PERSONAL INVOLVEMENT IN CARE   This patient has a high probability of imminent, clinically significant deterioration, which requires the highest level of preparedness to intervene urgently. I participated in the decision-making and personally managed or directed the management of the following life and organ supporting interventions that required my frequent assessment to treat or prevent imminent deterioration. I personally spent n/a minutes of critical care time. This is time spent at this critically ill patient's bedside actively involved in patient care as well as the coordination of care and discussions with the patient's family. This does not include any procedural time which has been billed separately.     [address] [place] [address]  7/9/2021

## 2021-07-09 NOTE — PROGRESS NOTES
1945--- Bedside and Verbal shift change report given to Delbert Villanueva (oncoming nurse) by Laura Worthington (offgoing nurse). Report included the following information SBAR, Kardex, Procedure Summary, Intake/Output, MAR, Recent Results, Cardiac Rhythm SR, Alarm Parameters  and Dual Neuro Assessment. 2213---Cardene drip restarted at this time per orders to maintain SBP less than or equal to 140.       0017--Bedside and Verbal shift change report given to Candelario Roberts (oncoming nurse) by Delbert Villanueva (offgoing nurse). Report included the following information SBAR, Kardex, Procedure Summary, Intake/Output, MAR, Recent Results, Cardiac Rhythm SR, Alarm Parameters  and Dual Neuro Assessment.

## 2021-07-09 NOTE — PROGRESS NOTES
Problem: Dysphagia (Adult)  Goal: *Acute Goals and Plan of Care (Insert Text)  Description: Speech Therapy Goals  Initiated 7/8/2021    1. Patient will tolerate pureed diet/thin liquids without adverse effects within 7 days. Outcome: Progressing Towards Goal     Problem: Communication Impaired (Adult)  Goal: *Acute Goals and Plan of Care (Insert Text)  Description: Speech Therapy Goals  Initiated 7/8/2021    1. Patient will participate in automatic speech tasks with 80% accuracy within 7 days. 2. Patient will participate in simple object naming tasks with 80% accuracy within 7 days. Outcome: Progressing Towards Goal     SPEECH LANGUAGE PATHOLOGY DYSPHAGIA AND SPEECH TREATMENT  Patient: Alba Guy (55 y.o. female)  Date: 7/9/2021  Diagnosis: Brain mass [G93.89] <principal problem not specified>  Procedure(s) (LRB):  LEFT FRONTAL CRANIOTOMY WITH RESECTION INTRA CRANIAL METASTASIS WITH BRAIN LAB (URGENT) (Left) 2 Days Post-Op  Precautions:  Fall    ASSESSMENT:  Pt has limited interest in PO at this time and thus assessment challenging to complete of safety of swallow. However, noted that pt appeared to have characteristics of oral apraxia including inability to suck from the straw until SLP demonstrated how to put her lips in order to suck from the straw. Then, pt was able to successfully suck from straw. Given this confounding component would recommend pt continue on diet, however, may need to consider alternate means of nutrition if pt continues to refuse PO. 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 Noted continued perseveration on \"no\" and pt required tactile and visual cues to follow directions with PT/OT. Will continue to benefit from SLP at this and the next level of care.       PLAN:  Recommendations and Planned Interventions:  --pureed diet/thin liquids  --1:1 assistance feeding  --if healthcare provider feels comfortable, please demonstrate to patient how to complete actions (I.e. suck from straw)  --for command following, use tactile and visual cues    Patient continues to benefit from skilled intervention to address the above impairments. Continue treatment per established plan of care. Discharge Recommendations: To Be Determined     SUBJECTIVE:   Patient stated, \"No. OBJECTIVE:   Cognitive and Communication Status:  Neurologic State: Alert, Confused  Orientation Level: Unable to verbalize (aphasic )  Cognition: Decreased attention/concentration, Decreased command following, Impaired decision making, Impulsive, Poor safety awareness  Perception: Cues to maintain midline in sitting, Cues to maintain midline in standing, Tactile, Verbal  Perseveration: Perseverates during conversation (\"no\")  Safety/Judgement: Decreased awareness of environment, Decreased awareness of need for assistance, Decreased awareness of need for safety, Decreased insight into deficits, Fall prevention    Dysphagia Treatment and Interventions:    P.O. Trials:  Patient Position: upright in bed  Vocal quality prior to P.O.: No impairment  Consistency Presented:  Thin liquid;Puree  How Presented: SLP-fed/presented;Cup/sip;Straw;Spoon     Bolus Acceptance: No impairment  Bolus Formation/Control: Impaired  Type of Impairment: Anterior;Spillage  Propulsion: Discoordination  Oral Residue: None  Initiation of Swallow: Delayed (# of seconds)     Aspiration Signs/Symptoms: None  Pharyngeal Phase Characteristics: No impairment, issues, or problems              Speech Treatment and Interventions:    Language Comprehension and Expression:  Auditory Comprehension   Auditory Impairment: Yes  Response to Basic Yes/No Questions (%): 50 % (mostly responds \"no\")  One-Step Basic Commands (%): 0 % (required tactile cues)  Verbal Expression  Verbal Expression  Primary Mode of Expression: Verbal  Initiation: Impaired (%)  Automatic Speech Task: Impaired (comment)  Automatic speech task cueing type: Multi modality;Repetition  Repetition: Impaired  Word Repetition (%): 0 %  Naming: Impaired    Pain:  Pain Scale 1: Adult Nonverbal Pain Scale  Pain Intensity 1: 0       After treatment:   Call bell within reach and Nursing notified    COMMUNICATION/EDUCATION:     The patient's plan of care including recommendations, planned interventions, and recommended diet changes were discussed with: Registered nurse, PT, OT, NP.        CATALINA Jackson  Time Calculation: 20 mins

## 2021-07-09 NOTE — PROCEDURES
ELECTROENCEPHALOGRAM REPORT     Patient Name: Brittaney Hernandez  : 1932  Age: 80 y.o. Ordering physician: Zackary Hathaway NP  Date of EEG: 2021  Interpreting physician: Christine Salinas MD    PROCEDURE: Routine video electroencephalogram (vEEG). CLINICAL INDICATION: The patient is a 80 y.o. female who is being evaluated for subclinical seizures as contributor to expressive aphasia. DESCRIPTION OF THE RECORD:   Throughout this epoch, there is marked asymmetry to the background. Over the right hemisphere, a well-formed posterior dominant rhythm is noted, with polymorphic theta parasagitally. In the left hemisphere, polymorphic delta with periods of attenuation are noted in the frontotemporal chain, with admixed theta/delta noted in the parasagital chain. Transition to drowsiness is manifest by fragmentation of the waking background in the right hemisphere with progressive increase in theta with no sleep architecture noted. No epileptiform discharges or electrographic seizures noted. Reactivity present to environmental stimuli, no photic driving noted. Single lead ECG demonstrates sinus rhythm. INTERPRETATION:     This is an abnormal routine vEEG characterized by generalized dysfunction, most notable in the left frontotemporal chain. No epileptiform discharges or electrographic seizures noted.        Isabelle Calderon MD

## 2021-07-09 NOTE — PROGRESS NOTES
Neurosurgery Progress Note  [facility], ACNP-BC          Admit Date: 2021   LOS: 7 days        Daily Progress Note: 2021    POD:2 Day Post-Op    S/P: Procedure(s):  LEFT FRONTAL CRANIOTOMY WITH RESECTION INTRA CRANIAL METASTASIS WITH BRAIN LAB (URGENT)    HPI: The patient presented to Dr. Ciera Bowman oncology at [facility] after a brain MRI showed multiple brain mets of unknown origin. She had nausea and vomiting for a month and lost about 20 pounds. She was referred to Dr. Fredrick Grant after being started on steroids, but then was referred to the ER for work-up and evaluation. The patient's CT scans of the chest/abdomen/pelvis were unrevealing for other sites of disease. Due to the family's desire to be aggressive, after discussing risks, benefits, and alternatives of surgery, they decided to proceed with resection of the left frontal tumor for diagnosis. She underwent a left frontal craniotomy with Dr. Gisela Ivory yesterday. Prelim path consistent with melanoma. She presents in the ICU with non-fluent expressive/receptive aphasia and some right sided weakness this morning. She does answer yes/no questions appropriately. Subjective:   No acute events overnight. Pt continues with aphasia. Pathology returned today as melanoma. Unable to obtain ROS due to aphasia. Objective:     Vital signs  Temp (24hrs), Av.8 °F (37.1 °C), Min:98.2 °F (36.8 °C), Max:100.4 °F (38 °C)   701 - 1900  In: -   Out: 175 [Urine:175]  1901 -  0700  In: 3355.4 [P.O.:75; I.V.:3280.4]  Out: 2815 [Urine:2815]    Visit Vitals  /74   Pulse (!) 52   Temp 98.2 °F (36.8 °C)   Resp 15   Ht 5' 4\" (1.626 m)   Wt 56.7 kg (125 lb)   SpO2 96%   BMI 21.46 kg/m²    O2 Flow Rate (L/min): 2 l/min O2 Device: None (Room air)     Pain control  Pain Assessment  Pain Scale 1: Adult Nonverbal Pain Scale  Pain Intensity 1: 0    PT/OT  Gait                 Physical Exam:  Gen:NAD. Neuro: Awake.  Difficult to obtain orientation due to aphasia. Intermittent command following (will hold arms up in the air steady if you lift them up for her). Speech moderate non-fluent expressive/receptive aphasia. Affect flat  PERRL. EOMI. Face symmetric. Tongue midline. BARCENAS spontaneously L>R. Gait deferred. Skin: Scalp dressing with dried blood on it    CT head without contrast on 07/08/21 shows interval resection superior left frontal lobe lesion with small amount of pneumocephalus and hemorrhage at the resection site. No mass effect/edema. No  midline shift. Chronic white matter disease, other hyperdense metastases, and ventriculomegaly all unchanged. 24 hour results:    Recent Results (from the past 24 hour(s))   CBC WITH AUTOMATED DIFF    Collection Time: 07/09/21  4:19 AM   Result Value Ref Range    WBC 9.9 3.6 - 11.0 K/uL    RBC 4.36 3.80 - 5.20 M/uL    HGB 12.8 11.5 - 16.0 g/dL    HCT 38.2 35.0 - 47.0 %    MCV 87.6 80.0 - 99.0 FL    MCH 29.4 26.0 - 34.0 PG    MCHC 33.5 30.0 - 36.5 g/dL    RDW 14.0 11.5 - 14.5 %    PLATELET 262 402 - 899 K/uL    MPV 10.1 8.9 - 12.9 FL    NRBC 0.0 0  WBC    ABSOLUTE NRBC 0.00 0.00 - 0.01 K/uL    NEUTROPHILS 82 (H) 32 - 75 %    LYMPHOCYTES 8 (L) 12 - 49 %    MONOCYTES 9 5 - 13 %    EOSINOPHILS 0 0 - 7 %    BASOPHILS 0 0 - 1 %    IMMATURE GRANULOCYTES 1 (H) 0.0 - 0.5 %    ABS. NEUTROPHILS 8.1 (H) 1.8 - 8.0 K/UL    ABS. LYMPHOCYTES 0.8 0.8 - 3.5 K/UL    ABS. MONOCYTES 0.9 0.0 - 1.0 K/UL    ABS. EOSINOPHILS 0.0 0.0 - 0.4 K/UL    ABS. BASOPHILS 0.0 0.0 - 0.1 K/UL    ABS. IMM.  GRANS. 0.1 (H) 0.00 - 0.04 K/UL    DF AUTOMATED     METABOLIC PANEL, BASIC    Collection Time: 07/09/21  4:19 AM   Result Value Ref Range    Sodium 134 (L) 136 - 145 mmol/L    Potassium 3.5 3.5 - 5.1 mmol/L    Chloride 104 97 - 108 mmol/L    CO2 23 21 - 32 mmol/L    Anion gap 7 5 - 15 mmol/L    Glucose 126 (H) 65 - 100 mg/dL    BUN 16 6 - 20 MG/DL    Creatinine 0.44 (L) 0.55 - 1.02 MG/DL    BUN/Creatinine ratio 36 (H) 12 - 20      GFR est AA >60 >60 ml/min/1.73m2    GFR est non-AA >60 >60 ml/min/1.73m2    Calcium 8.6 8.5 - 10.1 MG/DL   MAGNESIUM    Collection Time: 07/09/21  4:19 AM   Result Value Ref Range    Magnesium 2.4 1.6 - 2.4 mg/dL   PHOSPHORUS    Collection Time: 07/09/21  4:19 AM   Result Value Ref Range    Phosphorus 1.8 (L) 2.6 - 4.7 MG/DL          Assessment:     Active Problems:    Brain mass (7/2/2021)        Plan:   1. Brain metastases   - s/p crani 07/07. Path consistent with melanoma   - cont decadron   - cont keppra. Will obtain EEG to r/o subclinical sz as contribution to aphasia. If she is having them, then would add vimpat.   - PT/OT/Speech   - normalize and mobilize   - ok to transfer to [facility]. Intensivist to initiate transfer to hospitalist service. - will need adjuvant radiosurgery to other lesions  2. Brain compression with cerebral edema   - due to #1   - plans as above  3. Expressive/receptive aphasia   - due to #1, 2   - speech therapy  4. HTN   - cont atenolol, lisinopril   - SBP<140   - hydralazine/labetalol PRN  5. GERD   - cont PPI    Activity: up with assist  DVT ppx: SCDs  Dispo: inpt rehab? Plan d/w Dr. Jonny Keene, nurse, intensivist, granddaughter and her  at bedside, oncology      Robert Alvarez NP     07/09/21 3509  Reviewed EEG with neurologist. No seizures. Will cont keppra at current dose.   German 3

## 2021-07-09 NOTE — PROGRESS NOTES
Problem: Self Care Deficits Care Plan (Adult)  Goal: *Acute Goals and Plan of Care (Insert Text)  Description:   FUNCTIONAL STATUS PRIOR TO ADMISSION: Patient was modified independent using a walker for functional mobility. Per chart, patient lives in [address] [place] [address] and was able to complete ADLs. HOME SUPPORT: The patient lived alone with family to provide assistance. Occupational Therapy Goals  Initiated 7/8/2021  1. Patient will perform 2 simple grooming tasks sitting unsupported with moderate assistance within 7 day(s). 2.  Patient will perform anterior neck to thigh bathing sitting unsupported with moderate assistance within 7 day(s). 3.  Patient will perform toilet transfers with moderate assistance within 7 day(s). 4.  Patient will perform all aspects of toileting with moderate assistance within 7 day(s). 5.  Patient will participate in upper extremity therapeutic exercise/activities with minimal assistance/contact guard assist for 5 minutes within 7 day(s). Outcome: Not Met    OCCUPATIONAL THERAPY TREATMENT  Patient: Pilar Quinn (21 y.o. female)  Date: 7/9/2021  Diagnosis: Brain mass [G93.89] <principal problem not specified>  Procedure(s) (LRB):  LEFT FRONTAL CRANIOTOMY WITH RESECTION INTRA CRANIAL METASTASIS WITH BRAIN LAB (URGENT) (Left) 2 Days Post-Op  Precautions: Fall  Chart, occupational therapy assessment, plan of care, and goals were reviewed. ASSESSMENT  Patient continues with skilled OT services and is progressing towards goals. Patient required max A x2 for bed mobility and mod A x2 for transfer to chair. Once in chair, attempted to have patient self feed, however she did not initiate feeding task and was ultimately max A to self feed. Patient left in chair with call bell in reach, RN aware and all needs met. Will continue to follow, anticipate need for D/C to IPR.      Current Level of Function Impacting Discharge (ADLs): max A x1-2 for ADLs    Other factors to consider for discharge: IND/mod I with ADLs         PLAN :  Patient continues to benefit from skilled intervention to address the above impairments. Continue treatment per established plan of care to address goals. Recommend with staff: Recommend with nursing, ADLs with supervision/setup, once Egress Test completed then OOB to chair 3x/day and toileting via beside commode with 2 assist and gait belt. Thank you for completing as able in order to maintain patient strength, endurance and independence. Recommend next OT session: simple ADL tasks    Recommendation for discharge: (in order for the patient to meet his/her long term goals)  Therapy 3 hours per day 5-7 days per week    This discharge recommendation:  Has been made in collaboration with the attending provider and/or case management    IF patient discharges home will need the following DME: bedside commode, hospital bed, transfer bench, wheelchair and assist x2 for ADLs       SUBJECTIVE:   Patient stated ouch.     OBJECTIVE DATA SUMMARY:   Cognitive/Behavioral Status:  Neurologic State: Alert;Confused  Orientation Level: Unable to verbalize (aphasic )  Cognition: Decreased attention/concentration;Decreased command following; Impaired decision making; Impulsive;Poor safety awareness  Perception: Cues to maintain midline in sitting;Cues to maintain midline in standing; Tactile;Verbal  Perseveration: Perseverates during conversation (\"no\")  Safety/Judgement: Decreased awareness of environment;Decreased awareness of need for assistance;Decreased awareness of need for safety;Decreased insight into deficits; Fall prevention    Functional Mobility and Transfers for ADLs:  Bed Mobility:  Supine to Sit: Maximum assistance;Assist x2  Sit to Supine:  (up in chair)  Scooting: Maximum assistance;Assist x1 (seated)    Transfers:  Sit to Stand:  Moderate assistance;Assist x2  Bed to Chair: Moderate assistance;Assist x2    Balance:  Sitting: Impaired  Sitting - Static: Fair (occasional)  Sitting - Dynamic: Poor (constant support)  Standing: Impaired; With support  Standing - Static: Poor;Constant support  Standing - Dynamic : Poor;Constant support    ADL Intervention:  Feeding  Food to Mouth: Maximum assistance  Cues: Physical assistance    Cognitive Retraining  Safety/Judgement: Decreased awareness of environment;Decreased awareness of need for assistance;Decreased awareness of need for safety;Decreased insight into deficits; Fall prevention    Pain:  Reporting \"no\" when asked, however, saying \"ouch\" when attempting to stand with therapy assist    Activity Tolerance:   Fair and Poor    After treatment patient left in no apparent distress:   Sitting in chair, Call bell within reach, Bed / chair alarm activated, and RN bedside    COMMUNICATION/COLLABORATION:   The patients plan of care was discussed with: Physical therapist and Registered nurse.      Jessika Greene OT  Time Calculation: 14 mins

## 2021-07-09 NOTE — PROGRESS NOTES
[facility] at [address], [address] [place]: [phone]  F: [phone]    Reason for Visit:   Sarah Billings is a 80 y.o. female who is seen in hospital for fu new brain metastasis. Treatment History:   · 2021: MRI Brain w/wo Cont - multiple (at least 15) supratentorial and infratentorial hemorrhagic metastases   · 21: left frontal craniotomy and tumor resection - pathology + for metastatic melanoma    History of Present Illness:   Patient is a 80 y.o. F who presented to the ED on 21 at the instruction of Dr. Salomon Johnson due to new hemorrhagic brain lesions concerning for metastatic process. CT A/P previously performed due to nausea/vomiting and constipation for several months revealing no source of primary disease. She had upper and lower endoscopy which were unrevealing. CT chest performed revealing 4mm lung nodule but no definitive source of disease. Last mammogram 2020 which was benign. She had some mild confusion on arrival     States lives in a community. Has 3 sons. No family at bedside. Interval History:   Patient seen today for follow up new brain lesions. She is s/p tumor resection and pathology + metastatic melanoma. She is aphasic and says \"Yes\" and \"Fine\" to any of my questions. She occasionally follows commands.        Past Medical History:   Diagnosis Date    Essential hypertension     Hypercholesterolemia     Menopause     Stroke [facility])       Past Surgical History:   Procedure Laterality Date    COLONOSCOPY N/A 2021    COLONOSCOPY performed by Sherwin Hannah MD at [address] [place] [address]  2021         HX COLONOSCOPY      HX GYN       VI,Para V,SAB i    UPPER GI ENDOSCOPY,BIOPSY  2021           Social History     Tobacco Use    Smoking status: Former Smoker     Packs/day: 0.25     Years: 25.00     Pack years: 6.25     Types: Cigarettes     Start date:  Quit date: 1990     Years since quittin.8    Smokeless tobacco: Never Used   Substance Use Topics    Alcohol use: Not Currently     Comment: stopped May 2021      Family History   Problem Relation Age of Onset    Parkinson's Disease Mother     Cancer Father         Gastric     Current Facility-Administered Medications   Medication Dose Route Frequency    potassium phosphate 30 mmol in 0.9% sodium chloride 250 mL infusion   IntraVENous ONCE    amLODIPine (NORVASC) tablet 5 mg  5 mg Oral DAILY    levETIRAcetam (KEPPRA) 1,000 mg in 0.9% sodium chloride 100 mL IVPB  1,000 mg IntraVENous Q12H    atenoloL (TENORMIN) tablet 25 mg  25 mg Oral DAILY    atorvastatin (LIPITOR) tablet 20 mg  20 mg Oral DAILY    pantoprazole (PROTONIX) tablet 40 mg  40 mg Oral ACB    sodium chloride (NS) flush 5-40 mL  5-40 mL IntraVENous Q8H    sodium chloride (NS) flush 5-40 mL  5-40 mL IntraVENous PRN    acetaminophen (TYLENOL) solution 650 mg  650 mg Oral Q4H PRN    HYDROcodone-acetaminophen (NORCO) 5-325 mg per tablet 1 Tablet  1 Tablet Oral Q4H PRN    morphine injection 2 mg  2 mg IntraVENous Q2H PRN    ondansetron (ZOFRAN) injection 4 mg  4 mg IntraVENous Q4H PRN    labetaloL (NORMODYNE;TRANDATE) injection 10 mg  10 mg IntraVENous Q15MIN PRN    Or    hydrALAZINE (APRESOLINE) 20 mg/mL injection 10 mg  10 mg IntraVENous Q15MIN PRN    dexamethasone (DECADRON) 4 mg/mL injection 4 mg  4 mg IntraVENous Q6H    docusate sodium (COLACE) capsule 100 mg  100 mg Oral BID    lisinopriL (PRINIVIL, ZESTRIL) tablet 40 mg  40 mg Oral DAILY    metoclopramide HCl (REGLAN) tablet 5 mg  5 mg Oral BID    sodium chloride (NS) flush 5-40 mL  5-40 mL IntraVENous Q8H    sodium chloride (NS) flush 5-40 mL  5-40 mL IntraVENous PRN    acetaminophen (TYLENOL) tablet 650 mg  650 mg Oral Q6H PRN    Or    acetaminophen (TYLENOL) suppository 650 mg  650 mg Rectal Q6H PRN      Allergies   Allergen Reactions    Statins-Hmg-Coa Reductase Inhibitors Other (comments)     Other reaction(s): Other (comments)  Severs leg pain(Zetia, Crestor)  Severs leg pain(Zetia, Crestor)        Review of Systems: A complete review of systems was obtained, negative except as described above. Physical Exam:     Visit Vitals  /74   Pulse (!) 52   Temp 98.2 °F (36.8 °C)   Resp 15   Ht 5' 4\" (1.626 m)   Wt 125 lb (56.7 kg)   SpO2 96%   BMI 21.46 kg/m²     ECOG PS: 2  General: No distress  Eyes: PERRL, anicteric sclerae  HENT: Atraumatic  Neck: Supple  Respiratory:  normal respiratory effort, CTAB  CV: HR regular, no peripheral edema  MS: in bed, moves all extremities, RUE appears slightly weaker than left  Skin: No rashes, ecchymoses, or petechiae. Healing cranial wound. Normal temperature, turgor, and texture. Psych: Alert, aphasic - unable to eval orientation    Results:     Lab Results   Component Value Date/Time    WBC 9.9 07/09/2021 04:19 AM    HGB 12.8 07/09/2021 04:19 AM    HCT 38.2 07/09/2021 04:19 AM    PLATELET 423 95/[birth year]/1977 04:19 AM    MCV 87.6 07/09/2021 04:19 AM    ABS. NEUTROPHILS 8.1 (H) 07/09/2021 04:19 AM     Lab Results   Component Value Date/Time    Sodium 134 (L) 07/09/2021 04:19 AM    Potassium 3.5 07/09/2021 04:19 AM    Chloride 104 07/09/2021 04:19 AM    CO2 23 07/09/2021 04:19 AM    Glucose 126 (H) 07/09/2021 04:19 AM    BUN 16 07/09/2021 04:19 AM    Creatinine 0.44 (L) 07/09/2021 04:19 AM    GFR est AA >60 07/09/2021 04:19 AM    GFR est non-AA >60 07/09/2021 04:19 AM    Calcium 8.6 07/09/2021 04:19 AM     Lab Results   Component Value Date/Time    Bilirubin, total 0.5 07/02/2021 10:48 PM    ALT (SGPT) 33 07/02/2021 10:48 PM    Alk. phosphatase 102 07/02/2021 10:48 PM    Protein, total 7.3 07/02/2021 10:48 PM    Albumin 3.6 07/02/2021 10:48 PM    Globulin 3.7 07/02/2021 10:48 PM     CT A/P 5/27/2021  IMPRESSION  No acute intraperitoneal process is identified. Please see above for additional nonemergent incidental findings.     MRI brain 6/30/2021  IMPRESSION  1. Multiple (at least 15) supratentorial and infratentorial hemorrhagic  metastases, with some of the largest examples as above. Overall no midline shift  or herniation. MRI brain without contrast 6/30/2021  IMPRESSION  1. Multiple T1 hyperintense lesions in the cerebellum and cerebral hemispheres. Associated edema and relatively mild mass effect. Appearance is suggestive of  multiple metastasis from melanoma. Multiple subacute hemorrhages are less  likely. 2. Chronic age-related volume loss and white matter disease noted. 3. MRI technologists will have patient taken an be seen in the ED as this was  not unexpected finding. Assessment/Plan:   1) Metastatic Melanoma with mets to brain  MRI Brain w/wo Cont performed 6/30/2021 showed multiple (at least 15) supratentorial and infratentorial hemorrhagic metastases - concerning for malignant melanoma? Sent here from [facility] oncology and originally seen by Dr. Ryan Garcia. CT C/A/P with no definitive site of origin. S/p craniotomy with tumor resection on 7/7 - pathology + for malignant melanoma  No family at bedside during my exam. Called to Reena Kim and discussed pathology. Discussed treatment is outpatient and patient would need to heal from surgery prior to starting therapy. Will ensure patient has outpatient follow up at [facility] at discharge for further discussion of treatment options/recommendations. [facility] following with possible plans for GK therapy. Palliative care consulted. Outpatient PET ordered. Pt will fu with oncology at [facility] at d/c from here. 2) Nausea/Vomiting   Improved with decadron  Resolved. 3) Constipation   Bowel regimen per primary team    4) Aphasia  Due to #1    5) Psychosocial   Mood good  Lives near [facility]. Has supportive children. Will follow. Call for questions. This patient was seen in conjunction with CHARISMA Campos NP. I personally performed a face to face diagnostic evaluation on this patient. I personally reviewed the history and performed the key points on the exam.   I personally reviewed all points in the assessment and created treatment plan with the patient. Specifically, pt seen by me today  In bed in ICU not conversant open eyes  Path back and c/w metastatic melanoma  Systemic therapy options for this are pending molecular testing which will take probably a week to come back. Pt would need improved performance status in order to be able to get systemic therapy. No plans for any inpt systemic therapy (cannot give immunotherapy inpt)  Consult palliative care for support/ goals of care. Pt will fu with Dr Maryann Hoang at d/c. Call if questions over weekend.        Signed By: Eunice Ward DO

## 2021-07-09 NOTE — PROGRESS NOTES
Problem: Mobility Impaired (Adult and Pediatric)  Goal: *Acute Goals and Plan of Care (Insert Text)  Description: FUNCTIONAL STATUS PRIOR TO ADMISSION: Pt poor historian. Reports living alone in ILF and independence with mobility and ADLs. HOME SUPPORT PRIOR TO ADMISSION: Unclear level of support available     Physical Therapy Goals  Initiated 7/8/2021  1. Patient will move from supine to sit and sit to supine , scoot up and down, and roll side to side in bed with moderate assistance  within 7 day(s). 2.  Patient will transfer from bed to chair and chair to bed with maximal assistance using the least restrictive device within 7 day(s). 3.  Patient will perform sit to stand with maximal assistance within 7 day(s). 4.  Patient will ambulate with maximal assistance for 15 feet with the least restrictive device within 7 day(s). Outcome: Progressing Towards Goal  PHYSICAL THERAPY TREATMENT  Patient: Sandoval Grijalva (65 y.o. female)  Date: 7/9/2021  Diagnosis: Brain mass [G93.89] <principal problem not specified>  Procedure(s) (LRB):  LEFT FRONTAL CRANIOTOMY WITH RESECTION INTRA CRANIAL METASTASIS WITH BRAIN LAB (URGENT) (Left) 2 Days Post-Op  Precautions: Fall  Chart, physical therapy assessment, plan of care and goals were reviewed. ASSESSMENT  Patient continues with skilled PT services and is progressing towards goals - continues to present with aphasia, impaired balance, generalized weakness (R>L), unsteady gait, decreased activity tolerance, and overall decline in functional mobility. Pt perseverated on saying \"no\" during session but was able to actively participate with mobility. Transferred supine>sit with maxA x2 and sit<>stand with modA x2. Pt took a few side steps bed>chair with modA x2 demonstrating slow, shuffled steps with forward flexed posture. She ended session in a chair with all needs met and nursing updated. Recommending IPR upon discharge.        Current Level of Function Impacting Discharge (mobility/balance): modA x2 for transfers     Other factors to consider for discharge: aphasia, fall risk         PLAN :  Patient continues to benefit from skilled intervention to address the above impairments. Continue treatment per established plan of care. to address goals. Recommendation for discharge: (in order for the patient to meet his/her long term goals)  Therapy 3 hours per day 5-7 days per week    This discharge recommendation:  Has not yet been discussed the attending provider and/or case management    IF patient discharges home will need the following DME: to be determined (TBD)       SUBJECTIVE:   Patient stated No.    OBJECTIVE DATA SUMMARY:   Critical Behavior:  Neurologic State: Alert, Confused, Eyes open spontaneously  Orientation Level: Unable to verbalize, Other (Comment) (Only responds \"yes\")  Cognition: Impaired decision making, Decreased command following, Poor safety awareness  Safety/Judgement: Decreased awareness of environment, Decreased awareness of need for assistance, Decreased awareness of need for safety, Decreased insight into deficits, Fall prevention  Functional Mobility Training:  Bed Mobility:  Supine to Sit: Maximum assistance;Assist x2  Sit to Supine:  (up in chair)  Scooting: Maximum assistance;Assist x1 (seated)    Transfers:  Sit to Stand: Moderate assistance;Assist x2  Stand to Sit: Moderate assistance;Assist x2  Bed to Chair: Moderate assistance;Assist x2    Balance:  Sitting: Impaired  Sitting - Static: Fair (occasional)  Sitting - Dynamic: Poor (constant support)  Standing: Impaired; With support  Standing - Static: Poor;Constant support  Standing - Dynamic : Poor;Constant support    Activity Tolerance:   Fair and Poor    After treatment patient left in no apparent distress:   Sitting in chair, Call bell within reach, and Bed / chair alarm activated    COMMUNICATION/COLLABORATION:   The patients plan of care was discussed with: Occupational therapist and Registered nurse.      Mariana Singh, PT, DPT   Time Calculation: 16 mins

## 2021-07-09 NOTE — PROGRESS NOTES
Visited Ms Moraima Mora in [address] [place] [address] for ongoing emotional/spiritual support of patient and family. Ms Moraima Mora was sitting up in a chair and surespot tech was working with her.  introduced self to two family members who were present. They shared they thought patient was doing pretty good; she was supposed to be transferred out of ICU when a bed became available. Family said they had no concerns to share at that time. They were aware that Fr Andradee had been visiting her and had anointed her prior to her surgery. Stated that two of her sons would be visiting her tomorrow. Reassured them of ongoing  availability for support. : Rev. Bebo Singh.  Elliot Long; [facility], to contact [phone] [address] call: [phone]

## 2021-07-09 NOTE — PROGRESS NOTES
.  [facility] Adult  Hospitalist Group     ICU Transfer/Accept Summary     This patient is being [place] [address] ICU  DATE OF TRANSFER: 7/9/2021       PATIENT ID: Alba Guy  MRN: [id number]   YOB: 1932    PRIMARY CARE PROVIDER: Vida Kerr NP   DATE OF ADMISSION: 7/2/2021 10:05 PM    ATTENDING PHYSICIAN: Landa Merlin, NP  CONSULTATIONS:   IP CONSULT TO HOSPITALIST  IP CONSULT TO HOSPITALIST  IP CONSULT TO ONCOLOGY  IP CONSULT TO RADIATION ONCOLOGY  IP CONSULT TO PALLIATIVE CARE - PROVIDER  IP CONSULT TO NEUROSURGERY    PROCEDURES/SURGERIES:   Procedure(s):  LEFT FRONTAL CRANIOTOMY WITH RESECTION INTRA CRANIAL METASTASIS WITH BRAIN LAB (URGENT)    REASON FOR ADMISSION: <principal problem not specified>     HOSPITAL PROBLEM LIST:  Patient Active Problem List   Diagnosis Code    OA (osteoarthritis) M19.90    Stroke ([facility] [address] I63.9    Menopausal state N95.1    Other and unspecified hyperlipidemia E78.5    HTN (hypertension) I10    Hyperlipidemia E78.5    Stenosis of both internal carotid arteries I65.23    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 female with a past medical history of hypertension, dyslipidemia, and prior stroke who presents at the request of Dr. Diggs for new hemorrhagic brain lesions concerning for metastatic process. Overnight Events: No major o/n events reported. Still only says \"fine\" and \"yes\" to all questions      Hospitalist note:   07/09: Seen and examined patient sitting in bedside chair. States she is \"fine\" and shakes head \"no\" when asked if she is having any pain. Squeezed hands to command. Family member at bedside. States that she is doing \"the same\" and has not been talking much. No acute distress noted. EEG tech at bedside to complete EEG   Patient to transfer out of the ICU today. Assessment and Plan:    1.  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 S/p crani w/ biopsy, 7/7, Dr. Stephanie Castroadron, Keppra  c. Goal SBP < 140  d. MedOnc, RadOnc to see  e. D/c Cardene gtt  f. Added Norvasc  g. Q4h neuro checks  h. Neurology and NIS following   i. Palliative consulted   2. HTN  a. Home lisinopril, atenolol  b. Goal SBP <140   c. Monitor vital signs per unit routine   3. HLD  a. Continue atorvastatin   4. CVA hx  a. Plavix held           PHYSICAL EXAMINATION:  Visit Vitals  BP (!) 141/82   Pulse 60   Temp 98.4 °F (36.9 °C)   Resp 17   Ht 5' 4\" (1.626 m)   Wt 56.7 kg (125 lb)   SpO2 96%   BMI 21.46 kg/m²       General:          Alert, no distress  HEENT:           Atraumatic, MMM            Neck:               Supple, symmetrical,  thyroid: non tender  Lungs:             Clear to auscultation bilaterally. No Wheezing or Rhonchi. No rales. Heart:              Regular  rhythm,  No  murmur   No edema  Abdomen:       Soft, non-tender. Not distended. Bowel sounds normal  Extremities:     No cyanosis. No clubbing,  +2 distal pulses  Skin:                Not pale. Not Jaundiced  No rashes   Psych:             Not anxious or agitated. Neurologic:      Alert, moves all extremities left > right, difficulty in word finding.       CODE STATUS:  X Full Code    DNR    Partial    Comfort Care     Signed:   Joie Martell NP  Date of Service:  7/9/2021  5:45 PM

## 2021-07-10 LAB
ANION GAP SERPL CALC-SCNC: 4 MMOL/L (ref 5–15)
BASOPHILS # BLD: 0 K/UL (ref 0–0.1)
BASOPHILS NFR BLD: 0 % (ref 0–1)
BUN SERPL-MCNC: 22 MG/DL (ref 6–20)
BUN/CREAT SERPL: 52 (ref 12–20)
CALCIUM SERPL-MCNC: 8.4 MG/DL (ref 8.5–10.1)
CHLORIDE SERPL-SCNC: 107 MMOL/L (ref 97–108)
CO2 SERPL-SCNC: 25 MMOL/L (ref 21–32)
CREAT SERPL-MCNC: 0.42 MG/DL (ref 0.55–1.02)
DIFFERENTIAL METHOD BLD: ABNORMAL
EOSINOPHIL # BLD: 0 K/UL (ref 0–0.4)
EOSINOPHIL NFR BLD: 0 % (ref 0–7)
ERYTHROCYTE [DISTWIDTH] IN BLOOD BY AUTOMATED COUNT: 14.1 % (ref 11.5–14.5)
GLUCOSE SERPL-MCNC: 132 MG/DL (ref 65–100)
HCT VFR BLD AUTO: 35.3 % (ref 35–47)
HGB BLD-MCNC: 12.2 G/DL (ref 11.5–16)
IMM GRANULOCYTES # BLD AUTO: 0.1 K/UL (ref 0–0.04)
IMM GRANULOCYTES NFR BLD AUTO: 1 % (ref 0–0.5)
LYMPHOCYTES # BLD: 0.6 K/UL (ref 0.8–3.5)
LYMPHOCYTES NFR BLD: 12 % (ref 12–49)
MAGNESIUM SERPL-MCNC: 2.2 MG/DL (ref 1.6–2.4)
MCH RBC QN AUTO: 29.8 PG (ref 26–34)
MCHC RBC AUTO-ENTMCNC: 34.6 G/DL (ref 30–36.5)
MCV RBC AUTO: 86.3 FL (ref 80–99)
MONOCYTES # BLD: 0.5 K/UL (ref 0–1)
MONOCYTES NFR BLD: 9 % (ref 5–13)
NEUTS SEG # BLD: 4.2 K/UL (ref 1.8–8)
NEUTS SEG NFR BLD: 78 % (ref 32–75)
NRBC # BLD: 0 K/UL (ref 0–0.01)
NRBC BLD-RTO: 0 PER 100 WBC
PHOSPHATE SERPL-MCNC: 3.3 MG/DL (ref 2.6–4.7)
PLATELET # BLD AUTO: 143 K/UL (ref 150–400)
PMV BLD AUTO: 10.2 FL (ref 8.9–12.9)
POTASSIUM SERPL-SCNC: 4 MMOL/L (ref 3.5–5.1)
RBC # BLD AUTO: 4.09 M/UL (ref 3.8–5.2)
RBC MORPH BLD: ABNORMAL
SODIUM SERPL-SCNC: 136 MMOL/L (ref 136–145)
WBC # BLD AUTO: 5.4 K/UL (ref 3.6–11)

## 2021-07-10 PROCEDURE — 74011250636 HC RX REV CODE- 250/636: Performed by: SPECIALIST

## 2021-07-10 PROCEDURE — 74011250637 HC RX REV CODE- 250/637: Performed by: SPECIALIST

## 2021-07-10 PROCEDURE — 74011250637 HC RX REV CODE- 250/637: Performed by: INTERNAL MEDICINE

## 2021-07-10 PROCEDURE — 83735 ASSAY OF MAGNESIUM: CPT

## 2021-07-10 PROCEDURE — 74011000258 HC RX REV CODE- 258: Performed by: NURSE PRACTITIONER

## 2021-07-10 PROCEDURE — 65660000000 HC RM CCU STEPDOWN

## 2021-07-10 PROCEDURE — 84100 ASSAY OF PHOSPHORUS: CPT

## 2021-07-10 PROCEDURE — 74011250636 HC RX REV CODE- 250/636: Performed by: NURSE PRACTITIONER

## 2021-07-10 PROCEDURE — 85025 COMPLETE CBC W/AUTO DIFF WBC: CPT

## 2021-07-10 PROCEDURE — 36415 COLL VENOUS BLD VENIPUNCTURE: CPT

## 2021-07-10 PROCEDURE — 80048 BASIC METABOLIC PNL TOTAL CA: CPT

## 2021-07-10 RX ADMIN — Medication 10 ML: at 14:24

## 2021-07-10 RX ADMIN — Medication 10 ML: at 21:46

## 2021-07-10 RX ADMIN — AMLODIPINE BESYLATE 5 MG: 5 TABLET ORAL at 08:46

## 2021-07-10 RX ADMIN — PANTOPRAZOLE SODIUM 40 MG: 40 TABLET, DELAYED RELEASE ORAL at 08:45

## 2021-07-10 RX ADMIN — METOCLOPRAMIDE 5 MG: 10 TABLET ORAL at 17:45

## 2021-07-10 RX ADMIN — METOCLOPRAMIDE 5 MG: 10 TABLET ORAL at 08:41

## 2021-07-10 RX ADMIN — LEVETIRACETAM 1000 MG: 100 INJECTION, SOLUTION INTRAVENOUS at 23:57

## 2021-07-10 RX ADMIN — DEXAMETHASONE SODIUM PHOSPHATE 4 MG: 4 INJECTION, SOLUTION INTRAMUSCULAR; INTRAVENOUS at 12:21

## 2021-07-10 RX ADMIN — Medication 10 ML: at 08:58

## 2021-07-10 RX ADMIN — ATORVASTATIN CALCIUM 20 MG: 20 TABLET, FILM COATED ORAL at 08:46

## 2021-07-10 RX ADMIN — LISINOPRIL 40 MG: 20 TABLET ORAL at 08:55

## 2021-07-10 RX ADMIN — DEXAMETHASONE SODIUM PHOSPHATE 4 MG: 4 INJECTION, SOLUTION INTRAMUSCULAR; INTRAVENOUS at 05:57

## 2021-07-10 RX ADMIN — DOCUSATE SODIUM 100 MG: 100 CAPSULE, LIQUID FILLED ORAL at 17:45

## 2021-07-10 RX ADMIN — ATENOLOL 25 MG: 25 TABLET ORAL at 08:46

## 2021-07-10 RX ADMIN — DEXAMETHASONE SODIUM PHOSPHATE 4 MG: 4 INJECTION, SOLUTION INTRAMUSCULAR; INTRAVENOUS at 17:45

## 2021-07-10 RX ADMIN — DEXAMETHASONE SODIUM PHOSPHATE 4 MG: 4 INJECTION, SOLUTION INTRAMUSCULAR; INTRAVENOUS at 23:57

## 2021-07-10 RX ADMIN — LEVETIRACETAM 1000 MG: 100 INJECTION, SOLUTION INTRAVENOUS at 11:23

## 2021-07-10 RX ADMIN — DOCUSATE SODIUM 100 MG: 100 CAPSULE, LIQUID FILLED ORAL at 08:45

## 2021-07-10 NOTE — PROGRESS NOTES
Hospitalist Progress Note  Jonathon Hunter MD  Answering service: [phone] from in house phone      Date of Service:  7/10/2021  NAME:  Adrien Ashley  :  1932  MRN:  [id number]    Admission Summary:   89F p/w mets to brain  Interval history / Subjective:   Patient seen and examined at bedside, feels ok, no acute events. Occasional confusion. Assessment & Plan:     1. 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 PT/OT    2. HTN: - Home lisinopril, atenolol- Goal SBP <140   3. HLD- Continue atorvastatin   4. CVA hx- Plavix held    Code status: Full  DVT prophylaxis: SCDs  Care Plan discussed with: Patient/Family and Nurse  Disposition: [facility] Problems  Date Reviewed: 2021        Codes Class Noted POA    Brain mass ICD-10-CM: G93.89  ICD-9-CM: 348.89  2021 Unknown            Review of Systems:   Pertinent items are mentioned in interval history. Vital Signs:    Last 24hrs VS reviewed since prior progress note. Most recent are:  Visit Vitals  /78 (BP 1 Location: Left upper arm, BP Patient Position: At rest)   Pulse 60   Temp 97.8 °F (36.6 °C)   Resp 17   Ht 5' 4\" (1.626 m)   Wt 56.7 kg (125 lb)   SpO2 99%   BMI 21.46 kg/m²         Intake/Output Summary (Last 24 hours) at 7/10/2021 1308  Last data filed at 7/10/2021 1000  Gross per 24 hour   Intake 403.92 ml   Output 200 ml   Net 203.92 ml        Physical Examination:   Evaluated face to face and examined 07/10/21    General:  Alert, oriented, No acute distress. Frail, elderly [employer]  Card:  S1, S2, No murmurs, good peripheral perfusion, warm peripheries  Resp:  No accessory muscle use, Good AE, no wheezes.  no crepitations  Abd:  Soft, non-tender, non-distended, BS+  Extremities:  No cyanosis or clubbing, no significant edema  Neuro:  Grossly normal, no focal neuro deficits, follows commands, speech wnl  Psych:  fair insight, not agitated. Data Review:    Review and/or order of clinical lab test  Review and/or order of tests in the radiology section of [facility]  Review and/or order of tests in the medicine section of [facility]  Labs:     Recent Labs     07/10/21  0510 07/09/21  0419   WBC 5.4 9.9   HGB 12.2 12.8   HCT 35.3 38.2   * 154     Recent Labs     07/10/21  0510 07/09/21  0419 07/08/21  0241    134* 136   K 4.0 3.5 3.4*    104 105   CO2 25 23 25   BUN 22* 16 12   CREA 0.42* 0.44* 0.52*   * 126* 164*   CA 8.4* 8.6 8.5   MG 2.2 2.4  --    PHOS 3.3 1.8*  --      No results for input(s): ALT, AP, TBIL, TBILI, TP, ALB, GLOB, GGT, AML, LPSE in the last 72 hours. No lab exists for component: SGOT, GPT, AMYP, HLPSE  No results for input(s): INR, PTP, APTT, INREXT in the last 72 hours. No results for input(s): FE, TIBC, PSAT, FERR in the last 72 hours. No results found for: FOL, RBCF   No results for input(s): PH, PCO2, PO2 in the last 72 hours. No results for input(s): CPK, CKNDX, TROIQ in the last 72 hours.     No lab exists for component: CPKMB  Lab Results   Component Value Date/Time    Cholesterol, total 163 02/14/2019 12:00 AM    HDL Cholesterol 57 02/14/2019 12:00 AM    LDL, calculated 88 02/14/2019 12:00 AM    Triglyceride 92 02/14/2019 12:00 AM     No results found for: [facility]  Lab Results   Component Value Date/Time    Color YELLOW/STRAW 05/24/2021 11:30 AM    Appearance CLEAR 05/24/2021 11:30 AM    Specific gravity 1.018 05/24/2021 11:30 AM    Specific gravity 1.020 06/03/2017 06:45 PM    pH (UA) 6.0 05/24/2021 11:30 AM    Protein 100 (A) 05/24/2021 11:30 AM    Glucose Negative 05/24/2021 11:30 AM    Ketone Negative 05/24/2021 11:30 AM    Bilirubin NEGATIVE  06/03/2017 06:45 PM    Urobilinogen 0.2 05/24/2021 11:30 AM    Nitrites Negative 05/24/2021 11:30 AM    Leukocyte Esterase MODERATE (A) 05/24/2021 11:30 AM    Epithelial cells MODERATE (A) 05/24/2021 11:30 AM    Bacteria 1+ (A) 05/24/2021 11:30 AM    WBC 5-10 05/24/2021 11:30 AM    RBC 0-5 05/24/2021 11:30 AM     Medications Reviewed:     Current Facility-Administered Medications   Medication Dose Route Frequency    amLODIPine (NORVASC) tablet 5 mg  5 mg Oral DAILY    levETIRAcetam (KEPPRA) 1,000 mg in 0.9% sodium chloride 100 mL IVPB  1,000 mg IntraVENous Q12H    atenoloL (TENORMIN) tablet 25 mg  25 mg Oral DAILY    atorvastatin (LIPITOR) tablet 20 mg  20 mg Oral DAILY    pantoprazole (PROTONIX) tablet 40 mg  40 mg Oral ACB    sodium chloride (NS) flush 5-40 mL  5-40 mL IntraVENous Q8H    sodium chloride (NS) flush 5-40 mL  5-40 mL IntraVENous PRN    acetaminophen (TYLENOL) solution 650 mg  650 mg Oral Q4H PRN    HYDROcodone-acetaminophen (NORCO) 5-325 mg per tablet 1 Tablet  1 Tablet Oral Q4H PRN    morphine injection 2 mg  2 mg IntraVENous Q2H PRN    ondansetron (ZOFRAN) injection 4 mg  4 mg IntraVENous Q4H PRN    labetaloL (NORMODYNE;TRANDATE) injection 10 mg  10 mg IntraVENous Q15MIN PRN    Or    hydrALAZINE (APRESOLINE) 20 mg/mL injection 10 mg  10 mg IntraVENous Q15MIN PRN    dexamethasone (DECADRON) 4 mg/mL injection 4 mg  4 mg IntraVENous Q6H    docusate sodium (COLACE) capsule 100 mg  100 mg Oral BID    lisinopriL (PRINIVIL, ZESTRIL) tablet 40 mg  40 mg Oral DAILY    metoclopramide HCl (REGLAN) tablet 5 mg  5 mg Oral BID    sodium chloride (NS) flush 5-40 mL  5-40 mL IntraVENous Q8H    sodium chloride (NS) flush 5-40 mL  5-40 mL IntraVENous PRN    acetaminophen (TYLENOL) tablet 650 mg  650 mg Oral Q6H PRN    Or    acetaminophen (TYLENOL) suppository 650 mg  650 mg Rectal Q6H PRN   ______________________________________________________________________  EXPECTED LENGTH OF STAY: 3d 0h  ACTUAL LENGTH OF STAY:          8               Ravi Aiyana Ryder MD

## 2021-07-10 NOTE — PROGRESS NOTES
0730: Bedside shift change report given to Belinda Mccabe RN (oncoming nurse) by Arin Perry RN (offgoing nurse). Report included the following information SBAR, Intake/Output, Recent Results, Med Rec Status, Cardiac Rhythm sinus parrish and Alarm Parameters . 1930: Bedside shift change report given to Jesús English RN (oncoming nurse) by Belinda Mccabe RN (offgoing nurse). Report included the following information SBAR, Intake/Output, Recent Results, Med Rec Status, Cardiac Rhythm SB to NSR and Alarm Parameters .

## 2021-07-10 NOTE — PROGRESS NOTES
POD 3  keppra 1000 bid  Dec 4 q 6hrs  afeb  awake  Aphasic - expressive more so than receptive  +/- FC  Dressing C/D/I  S/P: left frontal crani and resection met (melanoma)  Wean decadron  Appreciate neurology assistance  Transfer orders on chart

## 2021-07-10 NOTE — PROGRESS NOTES
Problem: Falls - Risk of  Goal: *Absence of Falls  Description: Document Dominga Oneil Fall Risk and appropriate interventions in the flowsheet.   Outcome: Progressing Towards Goal  Note: Fall Risk Interventions:  Mobility Interventions: Bed/chair exit alarm, OT consult for ADLs, PT Consult for mobility concerns, PT Consult for assist device competence    Mentation Interventions: Adequate sleep, hydration, pain control, Bed/chair exit alarm, Door open when patient unattended, Familiar objects from home, Increase mobility, More frequent rounding, Reorient patient, Room close to nurse's station    Medication Interventions: Bed/chair exit alarm    Elimination Interventions: Bed/chair exit alarm, Call light in reach    History of Falls Interventions: Bed/chair exit alarm         Problem: Patient Education: Go to Patient Education Activity  Goal: Patient/Family Education  Outcome: Progressing Towards Goal     Problem: Patient Education:  Go to Education Activity  Goal: Patient/Family Education  Outcome: Progressing Towards Goal     Problem: Brain Tumor Day 1  Goal: Activity/Safety  Outcome: Progressing Towards Goal  Goal: Consults, if ordered  Outcome: Progressing Towards Goal  Goal: Diagnostic Test/Procedures  Outcome: Progressing Towards Goal  Goal: Nutrition/Diet  Outcome: Progressing Towards Goal  Goal: Discharge Planning  Outcome: Progressing Towards Goal  Goal: Medications  Outcome: Progressing Towards Goal  Goal: Respiratory  Outcome: Progressing Towards Goal  Goal: Treatments/Interventions/Procedures  Outcome: Progressing Towards Goal  Goal: *Neurologic stability  Outcome: Progressing Towards Goal  Goal: *Progress independence mobility/activities (eg: Mobility precautions)  Outcome: Progressing Towards Goal  Goal: *Adequate oxygenation  Outcome: Progressing Towards Goal  Goal: *Hemodynamically stable without vasoactive medications  Outcome: Progressing Towards Goal     Problem: Brain Tumor Day 2  Goal: Activity/Safety  Outcome: Progressing Towards Goal  Goal: Consults, if ordered  Outcome: Progressing Towards Goal  Goal: Diagnostic Test/Procedures  Outcome: Progressing Towards Goal  Goal: Nutrition/Diet  Outcome: Progressing Towards Goal  Goal: Medications  Outcome: Progressing Towards Goal  Goal: Respiratory  Outcome: Progressing Towards Goal  Goal: Treatments/Interventions/Procedures  Outcome: Progressing Towards Goal  Goal: *Hemodynamically stable without vasoactive medications  Outcome: Progressing Towards Goal  Goal: *Neurologic stability  Outcome: Progressing Towards Goal  Goal: *Progress independence mobility/activities (eg: Mobility precautions)  Outcome: Progressing Towards Goal  Goal: *Adequate oxygenation  Outcome: Progressing Towards Goal  Goal: *Tolerating diet  Outcome: Progressing Towards Goal     Problem: Brain Tumor Day 3 to Discharge  Goal: Activity/Safety  Outcome: Progressing Towards Goal  Goal: Nutrition/Diet  Outcome: Progressing Towards Goal  Goal: Discharge Planning  Outcome: Progressing Towards Goal  Goal: Medications  Outcome: Progressing Towards Goal  Goal: Treatments/Interventions/Procedures  Outcome: Progressing Towards Goal  Goal: Progressive Mobility and Function  Outcome: Progressing Towards Goal  Goal: Psychosocial  Outcome: Progressing Towards Goal     Problem: Brain Tumor Discharge Outcomes  Goal: *Neurologic stability  Outcome: Progressing Towards Goal  Goal: *Progress independence mobility/activities (eg: Mobility precautions)  Outcome: Progressing Towards Goal  Goal: *Adequate oxygenation  Outcome: Progressing Towards Goal  Goal: *Tolerates nutrition therapy  Outcome: Progressing Towards Goal  Goal: *Verbalizes understanding and describes medication purposes and frequencies  Outcome: Progressing Towards Goal  Goal: *Verbalizes understanding of type and use of pain medication  Outcome: Progressing Towards Goal  Goal: *Describes home care/support arrangements established based on need  Outcome: Progressing Towards Goal  Goal: *Describes available resources and support systems  Outcome: Progressing Towards Goal     Problem: Discharge Planning  Goal: *Discharge to safe environment  Outcome: Progressing Towards Goal  Goal: *Knowledge of medication management  Outcome: Progressing Towards Goal  Goal: *Knowledge of discharge instructions  Outcome: Progressing Towards Goal     Problem: Patient Education: Go to Patient Education Activity  Goal: Patient/Family Education  Outcome: Progressing Towards Goal     Problem: Breathing Pattern - Ineffective  Goal: *Absence of hypoxia  Outcome: Progressing Towards Goal  Goal: *Use of effective breathing techniques  Outcome: Progressing Towards Goal  Goal: *PALLIATIVE CARE:  Alleviation of Dyspnea  Outcome: Progressing Towards Goal     Problem: Patient Education: Go to Patient Education Activity  Goal: Patient/Family Education  Outcome: Progressing Towards Goal     Problem: Patient Education: Go to Patient Education Activity  Goal: Patient/Family Education  Outcome: Progressing Towards Goal     Problem: Patient Education: Go to Patient Education Activity  Goal: Patient/Family Education  Outcome: Progressing Towards Goal     Problem: Patient Education: Go to Patient Education Activity  Goal: Patient/Family Education  Outcome: Progressing Towards Goal     Problem: Patient Education: Go to Patient Education Activity  Goal: Patient/Family Education  Outcome: Progressing Towards Goal     Problem: Delirium Treatment  Goal: *Level of consciousness restored to baseline  Outcome: Progressing Towards Goal  Goal: *Level of environmental perceptions restored to baseline  Outcome: Progressing Towards Goal  Goal: *Sensory perception restored to baseline  Outcome: Progressing Towards Goal  Goal: *Emotional stability restored to baseline  Outcome: Progressing Towards Goal  Goal: *Functional assessment restored to baseline  Outcome: Progressing Towards Goal  Goal: *Absence of falls  Outcome: Progressing Towards Goal  Goal: *Will remain free of delirium, CAM Score negative  Outcome: Progressing Towards Goal  Goal: *Cognitive status will be restored to baseline  Outcome: Progressing Towards Goal  Goal: Interventions  Outcome: Progressing Towards Goal     Problem: Patient Education: Go to Patient Education Activity  Goal: Patient/Family Education  Outcome: Progressing Towards Goal       IPOC Reviewed, Ruth Rodriguez RN

## 2021-07-11 LAB
ANION GAP SERPL CALC-SCNC: 7 MMOL/L (ref 5–15)
BASOPHILS # BLD: 0 K/UL (ref 0–0.1)
BASOPHILS NFR BLD: 0 % (ref 0–1)
BUN SERPL-MCNC: 28 MG/DL (ref 6–20)
BUN/CREAT SERPL: 57 (ref 12–20)
CALCIUM SERPL-MCNC: 8.5 MG/DL (ref 8.5–10.1)
CHLORIDE SERPL-SCNC: 106 MMOL/L (ref 97–108)
CO2 SERPL-SCNC: 24 MMOL/L (ref 21–32)
CREAT SERPL-MCNC: 0.49 MG/DL (ref 0.55–1.02)
DIFFERENTIAL METHOD BLD: ABNORMAL
EOSINOPHIL # BLD: 0 K/UL (ref 0–0.4)
EOSINOPHIL NFR BLD: 0 % (ref 0–7)
ERYTHROCYTE [DISTWIDTH] IN BLOOD BY AUTOMATED COUNT: 14.2 % (ref 11.5–14.5)
GLUCOSE SERPL-MCNC: 135 MG/DL (ref 65–100)
HCT VFR BLD AUTO: 38.2 % (ref 35–47)
HGB BLD-MCNC: 13.2 G/DL (ref 11.5–16)
IMM GRANULOCYTES # BLD AUTO: 0.1 K/UL (ref 0–0.04)
IMM GRANULOCYTES NFR BLD AUTO: 1 % (ref 0–0.5)
LYMPHOCYTES # BLD: 0.9 K/UL (ref 0.8–3.5)
LYMPHOCYTES NFR BLD: 13 % (ref 12–49)
MAGNESIUM SERPL-MCNC: 2.5 MG/DL (ref 1.6–2.4)
MCH RBC QN AUTO: 29.9 PG (ref 26–34)
MCHC RBC AUTO-ENTMCNC: 34.6 G/DL (ref 30–36.5)
MCV RBC AUTO: 86.6 FL (ref 80–99)
MONOCYTES # BLD: 0.6 K/UL (ref 0–1)
MONOCYTES NFR BLD: 9 % (ref 5–13)
NEUTS SEG # BLD: 5 K/UL (ref 1.8–8)
NEUTS SEG NFR BLD: 77 % (ref 32–75)
NRBC # BLD: 0 K/UL (ref 0–0.01)
NRBC BLD-RTO: 0 PER 100 WBC
PHOSPHATE SERPL-MCNC: 2.5 MG/DL (ref 2.6–4.7)
PLATELET # BLD AUTO: 173 K/UL (ref 150–400)
PMV BLD AUTO: 10 FL (ref 8.9–12.9)
POTASSIUM SERPL-SCNC: 3.8 MMOL/L (ref 3.5–5.1)
RBC # BLD AUTO: 4.41 M/UL (ref 3.8–5.2)
SODIUM SERPL-SCNC: 137 MMOL/L (ref 136–145)
WBC # BLD AUTO: 6.5 K/UL (ref 3.6–11)

## 2021-07-11 PROCEDURE — 80048 BASIC METABOLIC PNL TOTAL CA: CPT

## 2021-07-11 PROCEDURE — 74011250637 HC RX REV CODE- 250/637: Performed by: INTERNAL MEDICINE

## 2021-07-11 PROCEDURE — 84100 ASSAY OF PHOSPHORUS: CPT

## 2021-07-11 PROCEDURE — 36415 COLL VENOUS BLD VENIPUNCTURE: CPT

## 2021-07-11 PROCEDURE — 74011250636 HC RX REV CODE- 250/636: Performed by: SPECIALIST

## 2021-07-11 PROCEDURE — 74011250637 HC RX REV CODE- 250/637: Performed by: SPECIALIST

## 2021-07-11 PROCEDURE — 74011250636 HC RX REV CODE- 250/636: Performed by: NEUROLOGICAL SURGERY

## 2021-07-11 PROCEDURE — 65660000000 HC RM CCU STEPDOWN

## 2021-07-11 PROCEDURE — 83735 ASSAY OF MAGNESIUM: CPT

## 2021-07-11 PROCEDURE — 85025 COMPLETE CBC W/AUTO DIFF WBC: CPT

## 2021-07-11 PROCEDURE — 74011000258 HC RX REV CODE- 258: Performed by: NURSE PRACTITIONER

## 2021-07-11 PROCEDURE — 74011250636 HC RX REV CODE- 250/636: Performed by: NURSE PRACTITIONER

## 2021-07-11 RX ORDER — DEXAMETHASONE SODIUM PHOSPHATE 4 MG/ML
4 INJECTION, SOLUTION INTRA-ARTICULAR; INTRALESIONAL; INTRAMUSCULAR; INTRAVENOUS; SOFT TISSUE EVERY 8 HOURS
Status: DISCONTINUED | OUTPATIENT
Start: 2021-07-11 | End: 2021-07-12

## 2021-07-11 RX ORDER — POLYETHYLENE GLYCOL 3350 17 G/17G
17 POWDER, FOR SOLUTION ORAL DAILY
Status: DISCONTINUED | OUTPATIENT
Start: 2021-07-11 | End: 2021-07-16 | Stop reason: HOSPADM

## 2021-07-11 RX ORDER — POLYETHYLENE GLYCOL 3350 17 G/17G
17 POWDER, FOR SOLUTION ORAL DAILY
Status: DISCONTINUED | OUTPATIENT
Start: 2021-07-12 | End: 2021-07-11

## 2021-07-11 RX ADMIN — LEVETIRACETAM 1000 MG: 100 INJECTION, SOLUTION INTRAVENOUS at 23:13

## 2021-07-11 RX ADMIN — Medication 10 ML: at 21:39

## 2021-07-11 RX ADMIN — ATORVASTATIN CALCIUM 20 MG: 20 TABLET, FILM COATED ORAL at 08:39

## 2021-07-11 RX ADMIN — LISINOPRIL 40 MG: 20 TABLET ORAL at 08:38

## 2021-07-11 RX ADMIN — ATENOLOL 25 MG: 25 TABLET ORAL at 08:38

## 2021-07-11 RX ADMIN — LEVETIRACETAM 1000 MG: 100 INJECTION, SOLUTION INTRAVENOUS at 10:49

## 2021-07-11 RX ADMIN — Medication 10 ML: at 17:26

## 2021-07-11 RX ADMIN — DOCUSATE SODIUM 100 MG: 100 CAPSULE, LIQUID FILLED ORAL at 17:26

## 2021-07-11 RX ADMIN — DEXAMETHASONE SODIUM PHOSPHATE 4 MG: 4 INJECTION, SOLUTION INTRAMUSCULAR; INTRAVENOUS at 06:16

## 2021-07-11 RX ADMIN — PANTOPRAZOLE SODIUM 40 MG: 40 TABLET, DELAYED RELEASE ORAL at 06:30

## 2021-07-11 RX ADMIN — Medication 10 ML: at 06:16

## 2021-07-11 RX ADMIN — METOCLOPRAMIDE 5 MG: 10 TABLET ORAL at 08:39

## 2021-07-11 RX ADMIN — DOCUSATE SODIUM 100 MG: 100 CAPSULE, LIQUID FILLED ORAL at 08:38

## 2021-07-11 RX ADMIN — DEXAMETHASONE SODIUM PHOSPHATE 4 MG: 4 INJECTION, SOLUTION INTRAMUSCULAR; INTRAVENOUS at 11:59

## 2021-07-11 RX ADMIN — POLYETHYLENE GLYCOL 3350 17 G: 17 POWDER, FOR SOLUTION ORAL at 12:25

## 2021-07-11 RX ADMIN — DEXAMETHASONE SODIUM PHOSPHATE 4 MG: 4 INJECTION, SOLUTION INTRAMUSCULAR; INTRAVENOUS at 21:39

## 2021-07-11 RX ADMIN — HYDRALAZINE HYDROCHLORIDE 10 MG: 20 INJECTION INTRAMUSCULAR; INTRAVENOUS at 01:06

## 2021-07-11 RX ADMIN — AMLODIPINE BESYLATE 5 MG: 5 TABLET ORAL at 08:39

## 2021-07-11 RX ADMIN — METOCLOPRAMIDE 5 MG: 10 TABLET ORAL at 17:26

## 2021-07-11 NOTE — PROGRESS NOTES
1930---Bedside and Verbal shift change report given to Lita Pratt (oncoming nurse) by Kalne Branham (offgoing nurse). Report included the following information SBAR, Kardex, Intake/Output, MAR, Recent Results, Cardiac Rhythm SR, Alarm Parameters  and Dual Neuro Assessment. 0405---Patient asleep at this time. When RN awakened patient for assessment and am labs, patient requested RN to \"please stick me later, I haven't had any sleep tonight until now. \" RN honored her request.     0630---am labs drawn and sent to lab. 0730---Bedside and Verbal shift change report given to Casie Shah (oncoming nurse) by Lita Pratt (offgoing nurse). Report included the following information SBAR, Kardex, Intake/Output, MAR, Recent Results, Cardiac Rhythm SR/SB, Alarm Parameters  and Dual Neuro Assessment.

## 2021-07-11 NOTE — PROGRESS NOTES
Hospitalist Progress Note  Enma Salazar MD  Answering service: [phone] from in house phone      Date of Service:  2021  NAME:  Rhys Manning  :  1932  MRN:  [id number]    Admission Summary:   89F p/w mets to brain  Interval history / Subjective:   Patient seen and examined at bedside, feels ok, managed 75% of her breakfast per RN. Family visiting. No acute complaints. Reports of chronic constipation. Will start regular miralax. Assessment & Plan:     1. 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 PT/OT    2. HTN: - Home lisinopril, atenolol- Goal SBP <140   3. HLD- Continue atorvastatin   4. CVA hx- Plavix held    Code status: Full  DVT prophylaxis: SCDs  Care Plan discussed with: Patient/Family and Nurse  Disposition: [facility] Problems  Date Reviewed: 2021        Codes Class Noted POA    Brain mass ICD-10-CM: G93.89  ICD-9-CM: 348.89  2021 Unknown            Review of Systems:   Pertinent items are mentioned in interval history. Vital Signs:    Last 24hrs VS reviewed since prior progress note. Most recent are:  Visit Vitals  /81   Pulse 65   Temp 98 °F (36.7 °C)   Resp 18   Ht 5' 4\" (1.626 m)   Wt 56.7 kg (125 lb)   SpO2 97%   BMI 21.46 kg/m²         Intake/Output Summary (Last 24 hours) at 2021 1201  Last data filed at 2021 0900  Gross per 24 hour   Intake 1220 ml   Output 1300 ml   Net -80 ml        Physical Examination:   Evaluated face to face and examined 21    General:  Alert, oriented, No acute distress. Frail, elderly [employer]  Card:  S1, S2, No murmurs, good peripheral perfusion, warm peripheries  Resp:  No accessory muscle use, Good AE, no wheezes.  no crepitations  Abd:  Soft, non-tender, non-distended, BS+  Extremities:  No cyanosis or clubbing, no significant edema  Neuro:  Grossly normal, no focal neuro deficits, follows commands, speech wnl  Psych:  fair insight, not agitated. Data Review:    Review and/or order of clinical lab test  Review and/or order of tests in the radiology section of [facility]  Review and/or order of tests in the medicine section of [facility]  Labs:     Recent Labs     07/11/21  0430 07/10/21  0510   WBC 6.5 5.4   HGB 13.2 12.2   HCT 38.2 35.3    143*     Recent Labs     07/11/21  0430 07/10/21  0510 07/09/21  0419    136 134*   K 3.8 4.0 3.5    107 104   CO2 24 25 23   BUN 28* 22* 16   CREA 0.49* 0.42* 0.44*   * 132* 126*   CA 8.5 8.4* 8.6   MG 2.5* 2.2 2.4   PHOS 2.5* 3.3 1.8*     No results for input(s): ALT, AP, TBIL, TBILI, TP, ALB, GLOB, GGT, AML, LPSE in the last 72 hours. No lab exists for component: SGOT, GPT, AMYP, HLPSE  No results for input(s): INR, PTP, APTT, INREXT, INREXT in the last 72 hours. No results for input(s): FE, TIBC, PSAT, FERR in the last 72 hours. No results found for: FOL, RBCF   No results for input(s): PH, PCO2, PO2 in the last 72 hours. No results for input(s): CPK, CKNDX, TROIQ in the last 72 hours.     No lab exists for component: CPKMB  Lab Results   Component Value Date/Time    Cholesterol, total 163 02/14/2019 12:00 AM    HDL Cholesterol 57 02/14/2019 12:00 AM    LDL, calculated 88 02/14/2019 12:00 AM    Triglyceride 92 02/14/2019 12:00 AM     No results found for: [facility]  Lab Results   Component Value Date/Time    Color YELLOW/STRAW 05/24/2021 11:30 AM    Appearance CLEAR 05/24/2021 11:30 AM    Specific gravity 1.018 05/24/2021 11:30 AM    Specific gravity 1.020 06/03/2017 06:45 PM    pH (UA) 6.0 05/24/2021 11:30 AM    Protein 100 (A) 05/24/2021 11:30 AM    Glucose Negative 05/24/2021 11:30 AM    Ketone Negative 05/24/2021 11:30 AM    Bilirubin NEGATIVE  06/03/2017 06:45 PM Urobilinogen 0.2 05/24/2021 11:30 AM    Nitrites Negative 05/24/2021 11:30 AM    Leukocyte Esterase MODERATE (A) 05/24/2021 11:30 AM    Epithelial cells MODERATE (A) 05/24/2021 11:30 AM    Bacteria 1+ (A) 05/24/2021 11:30 AM    WBC 5-10 05/24/2021 11:30 AM    RBC 0-5 05/24/2021 11:30 AM     Medications Reviewed:     Current Facility-Administered Medications   Medication Dose Route Frequency    amLODIPine (NORVASC) tablet 5 mg  5 mg Oral DAILY    levETIRAcetam (KEPPRA) 1,000 mg in 0.9% sodium chloride 100 mL IVPB  1,000 mg IntraVENous Q12H    atenoloL (TENORMIN) tablet 25 mg  25 mg Oral DAILY    atorvastatin (LIPITOR) tablet 20 mg  20 mg Oral DAILY    pantoprazole (PROTONIX) tablet 40 mg  40 mg Oral ACB    sodium chloride (NS) flush 5-40 mL  5-40 mL IntraVENous Q8H    sodium chloride (NS) flush 5-40 mL  5-40 mL IntraVENous PRN    acetaminophen (TYLENOL) solution 650 mg  650 mg Oral Q4H PRN    HYDROcodone-acetaminophen (NORCO) 5-325 mg per tablet 1 Tablet  1 Tablet Oral Q4H PRN    morphine injection 2 mg  2 mg IntraVENous Q2H PRN    ondansetron (ZOFRAN) injection 4 mg  4 mg IntraVENous Q4H PRN    dexamethasone (DECADRON) 4 mg/mL injection 4 mg  4 mg IntraVENous Q6H    docusate sodium (COLACE) capsule 100 mg  100 mg Oral BID    lisinopriL (PRINIVIL, ZESTRIL) tablet 40 mg  40 mg Oral DAILY    metoclopramide HCl (REGLAN) tablet 5 mg  5 mg Oral BID    sodium chloride (NS) flush 5-40 mL  5-40 mL IntraVENous Q8H    sodium chloride (NS) flush 5-40 mL  5-40 mL IntraVENous PRN    acetaminophen (TYLENOL) tablet 650 mg  650 mg Oral Q6H PRN    Or    acetaminophen (TYLENOL) suppository 650 mg  650 mg Rectal Q6H PRN   ______________________________________________________________________  EXPECTED LENGTH OF STAY: 3d 0h  ACTUAL LENGTH OF STAY:          9               Dandre Madrid MD

## 2021-07-11 NOTE — PROGRESS NOTES
Problem: Falls - Risk of  Goal: *Absence of Falls  Description: Document Shannon Patricia Fall Risk and appropriate interventions in the flowsheet.   Outcome: Progressing Towards Goal  Note: Fall Risk Interventions:  Mobility Interventions: Assess mobility with egress test, Bed/chair exit alarm, Communicate number of staff needed for ambulation/transfer, OT consult for ADLs, Patient to call before getting OOB, PT Consult for mobility concerns, PT Consult for assist device competence, Strengthening exercises (ROM-active/passive)    Mentation Interventions: Adequate sleep, hydration, pain control, Bed/chair exit alarm, Door open when patient unattended, Familiar objects from home, Family/sitter at bedside, Increase mobility, More frequent rounding, Reorient patient, Room close to nurse's station, Update white board    Medication Interventions: Assess postural VS orthostatic hypotension, Bed/chair exit alarm, Evaluate medications/consider consulting pharmacy, Patient to call before getting OOB, Teach patient to arise slowly    Elimination Interventions: Bed/chair exit alarm, Call light in reach, Patient to call for help with toileting needs, Stay With Me (per policy), Urinal in reach    History of Falls Interventions: Bed/chair exit alarm, Consult care management for discharge planning, Door open when patient unattended, Investigate reason for fall, Room close to nurse's station         Problem: Brain Tumor Day 3 to Discharge  Goal: Activity/Safety  Outcome: Progressing Towards Goal  Goal: Nutrition/Diet  Outcome: Progressing Towards Goal  Goal: Discharge Planning  Outcome: Progressing Towards Goal  Goal: Medications  Outcome: Progressing Towards Goal  Goal: Treatments/Interventions/Procedures  Outcome: Progressing Towards Goal  Goal: Progressive Mobility and Function  Outcome: Progressing Towards Goal  Goal: Psychosocial  Outcome: Progressing Towards Goal

## 2021-07-11 NOTE — PROGRESS NOTES
0745: Bedside shift change report given to Seun Fu RN (oncoming nurse) by Lenny Hernandez RN (offgoing nurse). Report included the following information SBAR, Intake/Output, MAR, Recent Results, Med Rec Status, Cardiac Rhythm SB to NSR and Alarm Parameters . 1300: TRANSFER - OUT REPORT:    Verbal report given to [facility]CO Fung (name) on Maria Victoria Castañedaow  [facility] [phone] ([facility]) for routine progression of care       Report consisted of patients Situation, Background, Assessment and   Recommendations(SBAR). Information from the following report(s) SBAR, Intake/Output, Recent Results, Med Rec Status, Cardiac Rhythm SB to NSR and Alarm Parameters  was reviewed with the receiving nurse. Lines:   Peripheral IV 07/06/21 Right Antecubital (Active)   Site Assessment Clean, dry, & intact 07/11/21 1201   Phlebitis Assessment 0 07/11/21 1201   Infiltration Assessment 0 07/11/21 1201   Dressing Status Clean, dry, & intact 07/11/21 1201   Dressing Type Transparent;Tape 07/11/21 1201   Hub Color/Line Status Blue;Capped 07/11/21 1201   Action Taken Open ports on tubing capped 07/11/21 1201   Alcohol Cap Used Yes 07/11/21 1201       Peripheral IV 07/07/21 Left Wrist (Active)   Site Assessment Clean, dry, & intact 07/11/21 1201   Phlebitis Assessment 0 07/11/21 1201   Infiltration Assessment 0 07/11/21 1201   Dressing Status Clean, dry, & intact 07/11/21 1201   Dressing Type Transparent;Tape 07/11/21 1201   Hub Color/Line Status Pink;Capped;Flushed 07/11/21 1201   Action Taken Open ports on tubing capped 07/11/21 1201   Alcohol Cap Used Yes 07/11/21 1201        Opportunity for questions and clarification was provided. Patient transported with:   Registered Nurse     Patient's sons, Patrica Prescott and Jolie Dallas, present at time of transfer. All belongings transferred with patient (robe, black bag, floral bag, and mon).  Godfrey Yates RN at bedside at time of arrival.

## 2021-07-11 NOTE — PROGRESS NOTES
Problem: Falls - Risk of  Goal: *Absence of Falls  Description: Document [place] Fall Risk and appropriate interventions in the flowsheet.   Outcome: Progressing Towards Goal  Note: Fall Risk Interventions:  Mobility Interventions: Bed/chair exit alarm, Communicate number of staff needed for ambulation/transfer, OT consult for ADLs, Patient to call before getting OOB, PT Consult for mobility concerns, PT Consult for assist device competence, Strengthening exercises (ROM-active/passive), Utilize walker, cane, or other assistive device, Utilize gait belt for transfers/ambulation    Mentation Interventions: Bed/chair exit alarm, Door open when patient unattended, Evaluate medications/consider consulting pharmacy, Gait belt with transfers/ambulation, More frequent rounding, Reorient patient, Room close to nurse's station, Toileting rounds, Update white board    Medication Interventions: Evaluate medications/consider consulting pharmacy, Patient to call before getting OOB, Teach patient to arise slowly    Elimination Interventions: Bed/chair exit alarm, Call light in reach, Patient to call for help with toileting needs, Toilet paper/wipes in reach, Toileting schedule/hourly rounds    History of Falls Interventions: Bed/chair exit alarm, Door open when patient unattended, Room close to nurse's station, Utilize gait belt for transfer/ambulation         Problem: Patient Education: Go to Patient Education Activity  Goal: Patient/Family Education  Outcome: Progressing Towards Goal     Problem: Patient Education:  Go to Education Activity  Goal: Patient/Family Education  Outcome: Progressing Towards Goal     Problem: Brain Tumor Day 1  Goal: Activity/Safety  Outcome: Progressing Towards Goal  Goal: Consults, if ordered  Outcome: Progressing Towards Goal  Goal: Diagnostic Test/Procedures  Outcome: Progressing Towards Goal  Goal: Nutrition/Diet  Outcome: Progressing Towards Goal  Goal: Discharge Planning  Outcome: Progressing Towards Goal  Goal: Medications  Outcome: Progressing Towards Goal  Goal: Respiratory  Outcome: Progressing Towards Goal  Goal: Treatments/Interventions/Procedures  Outcome: Progressing Towards Goal  Goal: *Neurologic stability  Outcome: Progressing Towards Goal  Goal: *Progress independence mobility/activities (eg: Mobility precautions)  Outcome: Progressing Towards Goal  Goal: *Adequate oxygenation  Outcome: Progressing Towards Goal  Goal: *Hemodynamically stable without vasoactive medications  Outcome: Progressing Towards Goal     Problem: Brain Tumor Day 2  Goal: Activity/Safety  Outcome: Progressing Towards Goal  Goal: Consults, if ordered  Outcome: Progressing Towards Goal  Goal: Diagnostic Test/Procedures  Outcome: Progressing Towards Goal  Goal: Nutrition/Diet  Outcome: Progressing Towards Goal  Goal: Medications  Outcome: Progressing Towards Goal  Goal: Respiratory  Outcome: Progressing Towards Goal  Goal: Treatments/Interventions/Procedures  Outcome: Progressing Towards Goal  Goal: *Hemodynamically stable without vasoactive medications  Outcome: Progressing Towards Goal  Goal: *Neurologic stability  Outcome: Progressing Towards Goal  Goal: *Progress independence mobility/activities (eg: Mobility precautions)  Outcome: Progressing Towards Goal  Goal: *Adequate oxygenation  Outcome: Progressing Towards Goal  Goal: *Tolerating diet  Outcome: Progressing Towards Goal     Problem: Brain Tumor Day 3 to Discharge  Goal: Activity/Safety  Outcome: Progressing Towards Goal  Goal: Nutrition/Diet  Outcome: Progressing Towards Goal  Goal: Discharge Planning  Outcome: Progressing Towards Goal  Goal: Medications  Outcome: Progressing Towards Goal  Goal: Treatments/Interventions/Procedures  Outcome: Progressing Towards Goal  Goal: Progressive Mobility and Function  Outcome: Progressing Towards Goal  Goal: Psychosocial  Outcome: Progressing Towards Goal     Problem: Brain Tumor Discharge Outcomes  Goal: *Neurologic stability  Outcome: Progressing Towards Goal  Goal: *Progress independence mobility/activities (eg: Mobility precautions)  Outcome: Progressing Towards Goal  Goal: *Adequate oxygenation  Outcome: Progressing Towards Goal  Goal: *Tolerates nutrition therapy  Outcome: Progressing Towards Goal  Goal: *Verbalizes understanding and describes medication purposes and frequencies  Outcome: Progressing Towards Goal  Goal: *Verbalizes understanding of type and use of pain medication  Outcome: Progressing Towards Goal  Goal: *Describes home care/support arrangements established based on need  Outcome: Progressing Towards Goal  Goal: *Describes available resources and support systems  Outcome: Progressing Towards Goal     Problem: Discharge Planning  Goal: *Discharge to safe environment  Outcome: Progressing Towards Goal  Goal: *Knowledge of medication management  Outcome: Progressing Towards Goal  Goal: *Knowledge of discharge instructions  Outcome: Progressing Towards Goal     Problem: Patient Education: Go to Patient Education Activity  Goal: Patient/Family Education  Outcome: Progressing Towards Goal     Problem: Breathing Pattern - Ineffective  Goal: *Absence of hypoxia  Outcome: Progressing Towards Goal  Goal: *Use of effective breathing techniques  Outcome: Progressing Towards Goal  Goal: *PALLIATIVE CARE:  Alleviation of Dyspnea  Outcome: Progressing Towards Goal     Problem: Patient Education: Go to Patient Education Activity  Goal: Patient/Family Education  Outcome: Progressing Towards Goal     Problem: Patient Education: Go to Patient Education Activity  Goal: Patient/Family Education  Outcome: Progressing Towards Goal     Problem: Patient Education: Go to Patient Education Activity  Goal: Patient/Family Education  Outcome: Progressing Towards Goal     Problem: Patient Education: Go to Patient Education Activity  Goal: Patient/Family Education  Outcome: Progressing Towards Goal     Problem: Patient Education: Go to Patient Education Activity  Goal: Patient/Family Education  Outcome: Progressing Towards Goal     Problem: Delirium Treatment  Goal: *Level of consciousness restored to baseline  Outcome: Progressing Towards Goal  Goal: *Level of environmental perceptions restored to baseline  Outcome: Progressing Towards Goal  Goal: *Sensory perception restored to baseline  Outcome: Progressing Towards Goal  Goal: *Emotional stability restored to baseline  Outcome: Progressing Towards Goal  Goal: *Functional assessment restored to baseline  Outcome: Progressing Towards Goal  Goal: *Absence of falls  Outcome: Progressing Towards Goal  Goal: *Will remain free of delirium, CAM Score negative  Outcome: Progressing Towards Goal  Goal: *Cognitive status will be restored to baseline  Outcome: Progressing Towards Goal  Goal: Interventions  Outcome: Progressing Towards Goal     Problem: Patient Education: Go to Patient Education Activity  Goal: Patient/Family Education  Outcome: Progressing Towards Goal

## 2021-07-11 NOTE — PROGRESS NOTES
POD 4  keppra 1000 bid  Dec 4 q 8hrs  afeb  awake  Speech improved, still some word finding difficulty   FC  Dressing small serosanguinous area  S/P: left frontal crani and resection met (melanoma)  Wean decadron  Appreciate neurology assistance

## 2021-07-11 NOTE — ROUTINE PROCESS
Patient: Marleny Tian MRN: [id number]    Age: 80 y.o. YOB: 1932 Room/Bed: [facility]   Consent for video monitoring obtained after the below has been explained to the patient/family on 7/11/2021:  1. They are being monitored continuously in an effort to promote their safety. 2. That there may be times when the camera will be discontinued to provide care to me to ensure my dignity, such as during bathing or any activity that risks me being exposed. 3. They have the right to opt out of having this surveillance monitoring at any time. 4. It has been explained to the patient/family and they understand that the hospital does not maintain any recording of this surveillance monitoring.     Chelo No

## 2021-07-12 ENCOUNTER — DOCUMENTATION ONLY (OUTPATIENT)
Dept: ONCOLOGY | Age: 86
End: 2021-07-12

## 2021-07-12 PROBLEM — C79.31 MALIGNANT MELANOMA METASTATIC TO BRAIN (HCC): Status: ACTIVE | Noted: 2021-07-12

## 2021-07-12 LAB
MAGNESIUM SERPL-MCNC: 2.4 MG/DL (ref 1.6–2.4)
PHOSPHATE SERPL-MCNC: 2.1 MG/DL (ref 2.6–4.7)

## 2021-07-12 PROCEDURE — 36415 COLL VENOUS BLD VENIPUNCTURE: CPT

## 2021-07-12 PROCEDURE — 74011250637 HC RX REV CODE- 250/637: Performed by: INTERNAL MEDICINE

## 2021-07-12 PROCEDURE — 74011250637 HC RX REV CODE- 250/637: Performed by: SPECIALIST

## 2021-07-12 PROCEDURE — 74011250637 HC RX REV CODE- 250/637: Performed by: NURSE PRACTITIONER

## 2021-07-12 PROCEDURE — 77030038269 HC DRN EXT URIN PURWCK BARD -A

## 2021-07-12 PROCEDURE — 74011000258 HC RX REV CODE- 258: Performed by: NURSE PRACTITIONER

## 2021-07-12 PROCEDURE — 99221 1ST HOSP IP/OBS SF/LOW 40: CPT | Performed by: PHYSICAL MEDICINE & REHABILITATION

## 2021-07-12 PROCEDURE — 74011250636 HC RX REV CODE- 250/636: Performed by: NEUROLOGICAL SURGERY

## 2021-07-12 PROCEDURE — 92507 TX SP LANG VOICE COMM INDIV: CPT

## 2021-07-12 PROCEDURE — 97535 SELF CARE MNGMENT TRAINING: CPT

## 2021-07-12 PROCEDURE — 83735 ASSAY OF MAGNESIUM: CPT

## 2021-07-12 PROCEDURE — 74011250636 HC RX REV CODE- 250/636: Performed by: NURSE PRACTITIONER

## 2021-07-12 PROCEDURE — 84100 ASSAY OF PHOSPHORUS: CPT

## 2021-07-12 PROCEDURE — 74011250636 HC RX REV CODE- 250/636: Performed by: INTERNAL MEDICINE

## 2021-07-12 PROCEDURE — 92526 ORAL FUNCTION THERAPY: CPT

## 2021-07-12 PROCEDURE — 65660000000 HC RM CCU STEPDOWN

## 2021-07-12 PROCEDURE — 74011000250 HC RX REV CODE- 250: Performed by: INTERNAL MEDICINE

## 2021-07-12 PROCEDURE — 97530 THERAPEUTIC ACTIVITIES: CPT

## 2021-07-12 RX ORDER — ALBUTEROL SULFATE 0.83 MG/ML
2.5 SOLUTION RESPIRATORY (INHALATION) AS NEEDED
Status: CANCELLED
Start: 2021-07-19

## 2021-07-12 RX ORDER — AMLODIPINE BESYLATE 5 MG/1
10 TABLET ORAL DAILY
Status: DISCONTINUED | OUTPATIENT
Start: 2021-07-12 | End: 2021-07-16 | Stop reason: HOSPADM

## 2021-07-12 RX ORDER — BALSAM PERU/CASTOR OIL
OINTMENT (GRAM) TOPICAL EVERY 8 HOURS
Status: DISCONTINUED | OUTPATIENT
Start: 2021-07-12 | End: 2021-07-16 | Stop reason: HOSPADM

## 2021-07-12 RX ORDER — SODIUM CHLORIDE 0.9 % (FLUSH) 0.9 %
10 SYRINGE (ML) INJECTION AS NEEDED
Status: CANCELLED | OUTPATIENT
Start: 2021-07-19

## 2021-07-12 RX ORDER — HYDROCORTISONE SODIUM SUCCINATE 100 MG/2ML
100 INJECTION, POWDER, FOR SOLUTION INTRAMUSCULAR; INTRAVENOUS AS NEEDED
Status: CANCELLED | OUTPATIENT
Start: 2021-08-09

## 2021-07-12 RX ORDER — EPINEPHRINE 1 MG/ML
0.3 INJECTION, SOLUTION, CONCENTRATE INTRAVENOUS AS NEEDED
Status: CANCELLED | OUTPATIENT
Start: 2021-07-19

## 2021-07-12 RX ORDER — SODIUM CHLORIDE 9 MG/ML
10 INJECTION INTRAMUSCULAR; INTRAVENOUS; SUBCUTANEOUS AS NEEDED
Status: CANCELLED | OUTPATIENT
Start: 2021-08-09

## 2021-07-12 RX ORDER — DIPHENHYDRAMINE HYDROCHLORIDE 50 MG/ML
25 INJECTION, SOLUTION INTRAMUSCULAR; INTRAVENOUS AS NEEDED
Status: CANCELLED
Start: 2021-08-09

## 2021-07-12 RX ORDER — ALPRAZOLAM 0.25 MG/1
0.25 TABLET ORAL ONCE
Status: COMPLETED | OUTPATIENT
Start: 2021-07-12 | End: 2021-07-12

## 2021-07-12 RX ORDER — DIPHENHYDRAMINE HYDROCHLORIDE 50 MG/ML
50 INJECTION, SOLUTION INTRAMUSCULAR; INTRAVENOUS AS NEEDED
Status: CANCELLED
Start: 2021-08-09

## 2021-07-12 RX ORDER — SODIUM CHLORIDE 9 MG/ML
25 INJECTION, SOLUTION INTRAVENOUS CONTINUOUS
Status: CANCELLED | OUTPATIENT
Start: 2021-07-19

## 2021-07-12 RX ORDER — DIPHENHYDRAMINE HYDROCHLORIDE 50 MG/ML
50 INJECTION, SOLUTION INTRAMUSCULAR; INTRAVENOUS AS NEEDED
Status: CANCELLED
Start: 2021-07-19

## 2021-07-12 RX ORDER — HYDRALAZINE HYDROCHLORIDE 20 MG/ML
10 INJECTION INTRAMUSCULAR; INTRAVENOUS
Status: DISCONTINUED | OUTPATIENT
Start: 2021-07-12 | End: 2021-07-16 | Stop reason: HOSPADM

## 2021-07-12 RX ORDER — HYDROCORTISONE SODIUM SUCCINATE 100 MG/2ML
100 INJECTION, POWDER, FOR SOLUTION INTRAMUSCULAR; INTRAVENOUS AS NEEDED
Status: CANCELLED | OUTPATIENT
Start: 2021-07-19

## 2021-07-12 RX ORDER — SODIUM CHLORIDE 0.9 % (FLUSH) 0.9 %
10 SYRINGE (ML) INJECTION AS NEEDED
Status: CANCELLED | OUTPATIENT
Start: 2021-08-09

## 2021-07-12 RX ORDER — ONDANSETRON 2 MG/ML
8 INJECTION INTRAMUSCULAR; INTRAVENOUS AS NEEDED
Status: CANCELLED | OUTPATIENT
Start: 2021-08-09

## 2021-07-12 RX ORDER — ACETAMINOPHEN 325 MG/1
650 TABLET ORAL AS NEEDED
Status: CANCELLED
Start: 2021-07-19

## 2021-07-12 RX ORDER — ACETAMINOPHEN 325 MG/1
650 TABLET ORAL AS NEEDED
Status: CANCELLED
Start: 2021-08-09

## 2021-07-12 RX ORDER — DIPHENHYDRAMINE HYDROCHLORIDE 50 MG/ML
25 INJECTION, SOLUTION INTRAMUSCULAR; INTRAVENOUS AS NEEDED
Status: CANCELLED
Start: 2021-07-19

## 2021-07-12 RX ORDER — SODIUM CHLORIDE 9 MG/ML
25 INJECTION, SOLUTION INTRAVENOUS CONTINUOUS
Status: CANCELLED | OUTPATIENT
Start: 2021-08-09

## 2021-07-12 RX ORDER — SODIUM CHLORIDE 9 MG/ML
10 INJECTION INTRAMUSCULAR; INTRAVENOUS; SUBCUTANEOUS AS NEEDED
Status: CANCELLED | OUTPATIENT
Start: 2021-07-19

## 2021-07-12 RX ORDER — HYDRALAZINE HYDROCHLORIDE 20 MG/ML
10 INJECTION INTRAMUSCULAR; INTRAVENOUS
Status: DISCONTINUED | OUTPATIENT
Start: 2021-07-12 | End: 2021-07-12

## 2021-07-12 RX ORDER — DEXAMETHASONE 4 MG/1
4 TABLET ORAL EVERY 12 HOURS
Status: COMPLETED | OUTPATIENT
Start: 2021-07-12 | End: 2021-07-13

## 2021-07-12 RX ORDER — HEPARIN 100 UNIT/ML
300-500 SYRINGE INTRAVENOUS AS NEEDED
Status: CANCELLED
Start: 2021-07-19

## 2021-07-12 RX ORDER — HEPARIN 100 UNIT/ML
300-500 SYRINGE INTRAVENOUS AS NEEDED
Status: CANCELLED
Start: 2021-08-09

## 2021-07-12 RX ORDER — ONDANSETRON 2 MG/ML
8 INJECTION INTRAMUSCULAR; INTRAVENOUS AS NEEDED
Status: CANCELLED | OUTPATIENT
Start: 2021-07-19

## 2021-07-12 RX ORDER — EPINEPHRINE 1 MG/ML
0.3 INJECTION, SOLUTION, CONCENTRATE INTRAVENOUS AS NEEDED
Status: CANCELLED | OUTPATIENT
Start: 2021-08-09

## 2021-07-12 RX ORDER — DEXAMETHASONE 1 MG/1
2 TABLET ORAL EVERY 12 HOURS
Status: DISCONTINUED | OUTPATIENT
Start: 2021-07-14 | End: 2021-07-16 | Stop reason: HOSPADM

## 2021-07-12 RX ORDER — ALBUTEROL SULFATE 0.83 MG/ML
2.5 SOLUTION RESPIRATORY (INHALATION) AS NEEDED
Status: CANCELLED
Start: 2021-08-09

## 2021-07-12 RX ADMIN — Medication 10 ML: at 21:15

## 2021-07-12 RX ADMIN — AMLODIPINE BESYLATE 5 MG: 5 TABLET ORAL at 07:13

## 2021-07-12 RX ADMIN — ALPRAZOLAM 0.25 MG: 0.25 TABLET ORAL at 21:15

## 2021-07-12 RX ADMIN — DEXAMETHASONE SODIUM PHOSPHATE 4 MG: 4 INJECTION, SOLUTION INTRAMUSCULAR; INTRAVENOUS at 13:09

## 2021-07-12 RX ADMIN — Medication 10 ML: at 21:16

## 2021-07-12 RX ADMIN — Medication 10 ML: at 06:35

## 2021-07-12 RX ADMIN — POTASSIUM PHOSPHATE, MONOBASIC AND POTASSIUM PHOSPHATE, DIBASIC: 224; 236 INJECTION, SOLUTION, CONCENTRATE INTRAVENOUS at 09:54

## 2021-07-12 RX ADMIN — ATENOLOL 25 MG: 25 TABLET ORAL at 07:13

## 2021-07-12 RX ADMIN — LEVETIRACETAM 750 MG: 500 TABLET ORAL at 17:20

## 2021-07-12 RX ADMIN — Medication 10 ML: at 13:10

## 2021-07-12 RX ADMIN — DEXAMETHASONE SODIUM PHOSPHATE 4 MG: 4 INJECTION, SOLUTION INTRAMUSCULAR; INTRAVENOUS at 06:25

## 2021-07-12 RX ADMIN — PANTOPRAZOLE SODIUM 40 MG: 40 TABLET, DELAYED RELEASE ORAL at 09:43

## 2021-07-12 RX ADMIN — DEXAMETHASONE 4 MG: 4 TABLET ORAL at 21:15

## 2021-07-12 RX ADMIN — LISINOPRIL 40 MG: 20 TABLET ORAL at 07:13

## 2021-07-12 RX ADMIN — LEVETIRACETAM 1000 MG: 100 INJECTION, SOLUTION INTRAVENOUS at 13:09

## 2021-07-12 RX ADMIN — POLYETHYLENE GLYCOL 3350 17 G: 17 POWDER, FOR SOLUTION ORAL at 09:46

## 2021-07-12 RX ADMIN — Medication: at 13:11

## 2021-07-12 RX ADMIN — Medication: at 21:16

## 2021-07-12 RX ADMIN — AMLODIPINE BESYLATE 10 MG: 5 TABLET ORAL at 09:44

## 2021-07-12 RX ADMIN — METOCLOPRAMIDE 5 MG: 10 TABLET ORAL at 09:45

## 2021-07-12 RX ADMIN — ATORVASTATIN CALCIUM 20 MG: 20 TABLET, FILM COATED ORAL at 09:43

## 2021-07-12 RX ADMIN — DOCUSATE SODIUM 100 MG: 100 CAPSULE, LIQUID FILLED ORAL at 09:50

## 2021-07-12 NOTE — CONSULTS
Comprehensive Nutrition Assessment    Type and Reason for Visit: Reassess, Consult    Nutrition Recommendations/Plan:    1. Diet per SLP recommendations. 2. Ensure Enlive vanilla once/day, Boost pudding once/day. 3. Continue to document PO/supplement intake in Flowsheet I/O's.    4. Consider stronger bowel regimen with no BM since 7/2. Nutrition Assessment:    Past Medical History:   Diagnosis Date    Essential hypertension     Hypercholesterolemia     Menopause     Stroke [facility]) 2011     Pt admitted with new hemorrhagic brain lesions concerning for metastatic process, sent from [facility] oncology. Initial Nutrition consult received on admission for ONS and Best Practice Alert for unintentional wt loss PTA. On initial RD visit, pt was intermittently confused - but aware of her confusion, stating \"talking to me is not going to get you anywhere. \" She is quite pleasant, originally from [facility], having lived in the [facility] since [address]. She does report her wt ~122# and this is stable for her - chart review reflects this as well. Remove  wt hx in 2018 as 138#. Reports having some meal assistance at home from family, with son Victorina Thomas identified as her next of kin and main contact.     Nutrition re-consulted per family request for poor PO intake. She is now POD#5 L frontal crani and mets resection. Her language and confusion has improved. She reports her appetite has also improved, noticably yesterday and even better today - ate nearly all of her breakfast. SLP to see her again, placed on a pureed diet 7/9 with limited interest in PO. Agree with SLP note 7/9 that alternate means of nutrition may be needed if appetite was to remain poor - but currently doing well and hope to continue to see this improvement. She also is agreeable to once daily Ensure, drank some with RN this morning. Will decrease frequency of Boost pudding to also once/day. Phos has been trending low, KPhos addded today.      Malnutrition Assessment:  Malnutrition Status:  Mild malnutrition    Context:  Acute illness     Findings of the 6 clinical characteristics of malnutrition:   Energy Intake:  Mild decrease in energy intake (specify)  Weight Loss:  No significant weight loss     Body Fat Loss:  Unable to assess,     Muscle Mass Loss:  1 - Mild muscle mass loss, Clavicles (pectoralis & deltoids)  Fluid Accumulation:  No significant fluid accumulation,     Strength:  Not performed       Nutritionally Significant Medications: Decadron, Colace, Keppra, Reglan, Protonix, Miralax, K Phos (started today)    Estimated Daily Nutrient Needs:  Energy (kcal): 1550 (MSJ + 250 kcal); Weight Used for Energy Requirements: Current  Protein (g): 70 (1.2 g/kg_; Weight Used for Protein Requirements: Current  Fluid (ml/day): ~1550; Method Used for Fluid Requirements: 1 ml/kcal    Nutrition Related Findings:       BM: Abd soft, last BM recorded as 7/2  Edema: None  Wounds:  Surgical incision, Skin tears     Recent Labs     07/12/21  0509 07/11/21  0430 07/10/21  0510   GLU  --  135* 132*   BUN  --  28* 22*   CREA  --  0.49* 0.42*   NA  --  137 136   K  --  3.8 4.0   CL  --  106 107   CO2  --  24 25   CA  --  8.5 8.4*   PHOS 2.1* 2.5* 3.3   MG 2.4 2.5* 2.2     Recent Labs     07/11/21  0430 07/10/21  0510   WBC 6.5 5.4   HGB 13.2 12.2   HCT 38.2 35.3    143*     Current Nutrition Therapies:   Diet: Pureed  Supplements: Boost Pudding TID (added 7/10)  Additional Caloric Sources: None    Meal intake:   Patient Vitals for the past 168 hrs:   % Diet Eaten   07/11/21 1300 76 - 100%   07/10/21 1800 51 - 75%   07/10/21 1300 26 - 50%   07/10/21 1000 26 - 50%   07/09/21 1700 0%   07/09/21 1200 0%   07/09/21 0800 1 - 25%   07/08/21 1700 1 - 25%   07/08/21 1200 26 - 50%   07/08/21 0730 0%   07/07/21 1800 26 - 50%     Supplement Intake: No data found.     Anthropometric Measures:  · Height:  5' 4\" (162.6 cm)  · Current Body Wt:  56.7 kg (125 lb)   · Ideal Body Wt:  120: 105 %   · BMI Categories:  Underweight (BMI less than 22) age over 72     Wt Readings from Last 10 Encounters:   07/07/21 56.7 kg (125 lb)   07/02/21 53.3 kg (117 lb 6.4 oz)   06/17/21 54.4 kg (120 lb)   06/14/21 54.9 kg (121 lb)   07/10/19 62.1 kg (137 lb)   03/25/19 61.7 kg (136 lb)   03/06/19 61 kg (134 lb 8 oz)   02/14/19 62.1 kg (137 lb)   08/15/18 61.1 kg (134 lb 9.6 oz)   05/16/18 61.2 kg (135 lb)       Nutrition Diagnosis:   · Underweight, Predicted inadequate energy intake related to cognitive or neurological impairment as evidenced by intake 26-50%      Nutrition Interventions:   Food and/or Nutrient Delivery: Modify oral nutrition supplement, Continue current diet  Nutrition Education and Counseling: No recommendations at this time  Coordination of Nutrition Care: Continue to monitor while inpatient    Goals:  PO intakes >50% daily meals + 100% daily supplement.        Nutrition Monitoring and Evaluation:   Behavioral-Environmental Outcomes: None identified  Food/Nutrient Intake Outcomes: Food and nutrient intake, Supplement intake  Physical Signs/Symptoms Outcomes: Weight, Chewing or swallowing, Biochemical data, Meal time behavior    Discharge Planning:    Continue oral nutrition supplement     Argenis Walters RD     Contact via [employer]

## 2021-07-12 NOTE — PROGRESS NOTES
Problem: Self Care Deficits Care Plan (Adult)  Goal: *Acute Goals and Plan of Care (Insert Text)  Description:   FUNCTIONAL STATUS PRIOR TO ADMISSION: Patient was modified independent using a walker for functional mobility. Per chart, patient lives in [address] [place] [address] and was able to complete ADLs. HOME SUPPORT: The patient lived alone with family to provide assistance. Occupational Therapy Goals  Initiated 7/8/2021  1. Patient will perform 2 simple grooming tasks sitting unsupported with moderate assistance within 7 day(s). 2.  Patient will perform anterior neck to thigh bathing sitting unsupported with moderate assistance within 7 day(s). 3.  Patient will perform toilet transfers with moderate assistance within 7 day(s). 4.  Patient will perform all aspects of toileting with moderate assistance within 7 day(s). 5.  Patient will participate in upper extremity therapeutic exercise/activities with minimal assistance/contact guard assist for 5 minutes within 7 day(s). Outcome: Progressing Towards Goal     OCCUPATIONAL THERAPY TREATMENT  Patient: Vivien Dhillon (72 y.o. female)  Date: 7/12/2021  Diagnosis: Brain mass [G93.89] <principal problem not specified>  Procedure(s) (LRB):  LEFT FRONTAL CRANIOTOMY WITH RESECTION INTRA CRANIAL METASTASIS WITH BRAIN LAB (URGENT) (Left) 5 Days Post-Op  Precautions: Fall  Chart, occupational therapy assessment, plan of care, and goals were reviewed. ASSESSMENT  Patient continues with skilled OT services and is progressing towards goals however remains limited by impaired balance, cognition (attention, impulsivity, safety awareness, sequencing), strength, coordination, insight into deficits, and functional reach noted with lower body dressing & OOB mobility tasks completed this session.  She continues to require Min-Mod A x2 for mobility with MAX cues for sequencing and safety awareness, Total A to don clean brief with step by step cues to complete, poor standing balance with anteriorly flexed posture with poor carryover of extension cues & facilitation. Per chart review, patient and son wish to pursue SNF rehab at her facility she resides at, feel this is appropriate for rehabilitation to maximize functional independence in prep for CA treatments. Current Level of Function Impacting Discharge (ADLs): Mod-Total A for ADLs, Min-Mod A x2 for OOB mobility    Other factors to consider for discharge: fall risk, PMH, PLOF, CA treatments, assist of 2         PLAN :  Patient continues to benefit from skilled intervention to address the above impairments. Continue treatment per established plan of care to address goals. Recommend with staff: Recommend with nursing, ADLs with assist, OOB to chair 3x/day via gait belt and toileting via bedside commode with 2 assist. Thank you for completing as able in order to maintain patient strength, endurance and independence. Recommendation for discharge: (in order for the patient to meet his/her long term goals)  Therapy up to 5 days/week in SNF setting    This discharge recommendation:  Has been made in collaboration with the attending provider and/or case management    IF patient discharges home will need the following DME: To be determined (TBD)         SUBJECTIVE:   Patient stated Can I sit up in the chair now.  re: while working on donning brief    OBJECTIVE DATA SUMMARY:   Cognitive/Behavioral Status:  Neurologic State: Alert  Orientation Level: Oriented X4  Cognition: Decreased attention/concentration; Follows commands; Impaired decision making; Impulsive;Poor safety awareness  Perception: Appears intact  Perseveration: Perseverates during conversation  Safety/Judgement: Decreased awareness of environment;Decreased awareness of need for assistance;Decreased awareness of need for safety;Decreased insight into deficits    Functional Mobility and Transfers for ADLs:  Bed Mobility:  Supine to Sit: Minimum assistance; Moderate assistance;Assist x2  Scooting: Minimum assistance    Transfers:  Sit to Stand: Minimum assistance; Moderate assistance;Assist x2     Bed to Chair: Moderate assistance;Assist x2    Balance:  Sitting: Impaired  Sitting - Static: Fair (occasional)  Sitting - Dynamic: Poor (constant support)  Standing: Impaired; With support  Standing - Static: Poor;Constant support  Standing - Dynamic : Poor;Constant support    ADL Intervention:     Lower Body Dressing Assistance  Dressing Assistance: Total assistance(dependent)  Protective Undergarmet: Total assistance (dependent) (MAX sequencing & attention to task cues)  Leg Crossed Method Used: No  Position Performed: Bending forward method;Seated edge of bed;Standing  Cues: Physical assistance; Tactile cues provided;Verbal cues provided;Visual cues provided    Toileting  Toileting Assistance: Total assistance(dependent)  Bladder Hygiene: Total assistance (dependent) (purewick)    Cognitive Retraining  Orientation Retraining: Awareness of environment  Problem Solving: Awareness of environment  Executive Functions: Executing cognitive plans  Organizing/Sequencing: Breaking task down  Attention to Task: Single task  Following Commands: Follows one step commands/directions  Safety/Judgement: Decreased awareness of environment;Decreased awareness of need for assistance;Decreased awareness of need for safety;Decreased insight into deficits  Cues: Tactile cues provided;Verbal cues provided;Visual cues provided    Pain:  None reported    Activity Tolerance:   Fair    After treatment patient left in no apparent distress:   Sitting in chair, Call bell within reach, and Bed / chair alarm activated    COMMUNICATION/COLLABORATION:   The patients plan of care was discussed with: Physical therapist and Registered nurse.      AGUILAR Webb, OTR/L  Time Calculation: 19 mins

## 2021-07-12 NOTE — CONSULTS
Palliative Medicine Consult  [place]: [phone] ([id number])    Patient Name: Ron Prakash  YOB: 1932    Date of Initial Consult: 7/12/21  Reason for Consult: Care decisions   Requesting Provider: Kevin Lott  Primary Care Physician: Deidra Nixon NP     SUMMARY:   Ron Prakash is a 80 y.o. with recent dx of mult brain metastases on MRI brain 6/30/21 who was admitted on 7/2/2021 from [facility] at the request of Dr Edwin Jc, oncologist. PET scan not done yet. S/p L frontal craniotomy for resection of brain tumor and bx- path returned + for metastatic melanoma. Possible gamma knife and chemo. Current medical issues leading to Palliative Medicine involvement include: care decisions. 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 Live in [facility] different hospitals. [place] [facility] [place] [address]. . Originally from [facility]. PALLIATIVE DIAGNOSES:   1. Goals of care  2. Aphasia- improving per chart  3. Debility      PLAN:   1. Meet w/ pt who is engaging and alert, although continues to have some word finding difficulty. Son Marcy Vicente comes in during my introduction and explanation of palliative medicine's involvement for sx management, support. 2. Note that multiple conversations held thus far about goals of care and goals are for full restorative measures- son appreciates my acknowledgement of this, do not need to discuss further this admission- would be burdensome. 3. No AMD, but all 5 children very involved and in close communication. They are taking turns coming into town to help navigate medical situation. Their primary concern right now is getting chemotherapy as soon as they safely can, dealing w/ the fact she cannot do so while in rehab. They dealt w/ similar situation w/ their father- who also had brain mets.  They feel that his chemo was postponed unnecessarily and that it may have lead to his death prematurely. 4. Full code status. 5. As goals clear and pt will have excellent follow up with Oncologist/Palliative medicine physician Dr Kiley Tadeo will follow peripherally for now. 6. Initial consult note routed to primary continuity provider and/or primary health care team members  7. Communicated plan of care with: Palliative IDT [address] Team incl [address] [place] [address] care management      GOALS OF CARE / TREATMENT PREFERENCES:     GOALS OF CARE:  Patient/Health Care Proxy Stated Goals: Prolong life    TREATMENT PREFERENCES:   Code Status: Full Code    Advance Care Planning:  [x] The [facility] Interdisciplinary Team has updated the ACP Navigator with Health Care Decision Maker and Patient Capacity      Advance Care Planning 7/3/2021   Confirm Advance Directive Yes, on file       Medical Interventions: Full interventions           Other:    As far as possible, the palliative care team has discussed with patient / health care proxy about goals of care / treatment preferences for patient.      HISTORY:     History obtained from: Pt, chart, staff, family     CHIEF COMPLAINT: \"I am fine\"    HPI/SUBJECTIVE:    The patient is:   [x] Verbal and participatory  [] Non-participatory due to:     Pt denies pain, eating well, working w/ therapies, still w/ some word finding issues (tells that \"my wife  I live alone\" instead of \"\")    Clinical Pain Assessment (nonverbal scale for severity on nonverbal patients):   Clinical Pain Assessment  Severity: 0     Activity (Movement): Lying quietly, normal position    Duration: for how long has pt been experiencing pain (e.g., 2 days, 1 month, years)  Frequency: how often pain is an issue (e.g., several times per day, once every few days, constant)     FUNCTIONAL ASSESSMENT:     Palliative Performance Scale (PPS):  PPS: 50       PSYCHOSOCIAL/SPIRITUAL SCREENING:     Palliative IDT has assessed this patient for cultural preferences / practices and a referral made as appropriate to needs (Cultural Services, Patient Advocacy, Ethics, etc.)    Any spiritual / Confucianism concerns:  [] Yes /  [x] No    Caregiver Burnout:  [] Yes /  [x] No /  [] No Caregiver Present      Anticipatory grief assessment:   [x] Normal  / [] Maladaptive       ESAS Anxiety: Anxiety: 0    ESAS Depression: Depression: 0        REVIEW OF SYSTEMS:     Positive and pertinent negative findings in ROS are noted above in HPI. The following systems were [x] reviewed / [] unable to be reviewed as noted in HPI  Other findings are noted below. Systems: constitutional, ears/nose/mouth/throat, respiratory, gastrointestinal, genitourinary, musculoskeletal, integumentary, neurologic, psychiatric, endocrine. Positive findings noted below. Modified ESAS Completed by: provider   Fatigue: 6 Drowsiness: 0   Depression: 0 Pain: 0   Anxiety: 0 Nausea: 0   Anorexia: 2 Dyspnea: 0           Stool Occurrence(s): 0        PHYSICAL EXAM:     From RN flowsheet:  Wt Readings from Last 3 Encounters:   21 125 lb (56.7 kg)   21 117 lb 6.4 oz (53.3 kg)   21 120 lb (54.4 kg)     Blood pressure 139/81, pulse 64, temperature 97.4 °F (36.3 °C), resp. rate 24, height 5' 4\" (1.626 m), weight 125 lb (56.7 kg), SpO2 94 %.     Pain Scale 1: Numeric (0 - 10)  Pain Intensity 1: 0              Pain Intervention(s) 1: Rest  Last bowel movement, if known:     Constitutional: awake, alert, speech clear, NAD       HISTORY:     Active Problems:    Brain mass (2021)      Past Medical History:   Diagnosis Date    Essential hypertension     Hypercholesterolemia     Malignant [facility] to brain ([facility] [address] 2021    Menopause     Stroke ([facility] [address]       Past Surgical History:   Procedure Laterality Date    COLONOSCOPY N/A 2021    COLONOSCOPY performed by Leyda Lara MD at [address] [place] [address]  2021         HX COLONOSCOPY  2010    HX GYN       VI,Para V,SAB i    UPPER GI ENDOSCOPY,BIOPSY  2021           Family History   Problem Relation Age of Onset    Parkinson's Disease Mother     Cancer Father         Gastric      History reviewed, no pertinent family history. Social History     Tobacco Use    Smoking status: Former Smoker     Packs/day: 0.25     Years: 25.00     Pack years: 6.25     Types: Cigarettes     Start date:      Quit date: 1990     Years since quittin.8    Smokeless tobacco: Never Used   Substance Use Topics    Alcohol use: Not Currently     Comment: stopped May 2021     Allergies   Allergen Reactions    Statins-Hmg-Coa Reductase Inhibitors Other (comments)     Other reaction(s):  Other (comments)  Severs leg pain(Zetia, Crestor)  Severs leg pain(Zetia, Crestor)      Current Facility-Administered Medications   Medication Dose Route Frequency    amLODIPine (NORVASC) tablet 10 mg  10 mg Oral DAILY    potassium phosphate 20 mmol in 0.9% sodium chloride 250 mL infusion   IntraVENous ONCE    hydrALAZINE (APRESOLINE) 20 mg/mL injection 10 mg  10 mg IntraVENous Q6H PRN    balsam peru-castor oiL (VENELEX) ointment   Topical Q8H    dexamethasone (DECADRON) 4 mg/mL injection 4 mg  4 mg IntraVENous Q8H    polyethylene glycol (MIRALAX) packet 17 g  17 g Oral DAILY    levETIRAcetam (KEPPRA) 1,000 mg in 0.9% sodium chloride 100 mL IVPB  1,000 mg IntraVENous Q12H    atenoloL (TENORMIN) tablet 25 mg  25 mg Oral DAILY    atorvastatin (LIPITOR) tablet 20 mg  20 mg Oral DAILY    pantoprazole (PROTONIX) tablet 40 mg  40 mg Oral ACB    sodium chloride (NS) flush 5-40 mL  5-40 mL IntraVENous Q8H    sodium chloride (NS) flush 5-40 mL  5-40 mL IntraVENous PRN    acetaminophen (TYLENOL) solution 650 mg  650 mg Oral Q4H PRN    HYDROcodone-acetaminophen (NORCO) 5-325 mg per tablet 1 Tablet  1 Tablet Oral Q4H PRN    morphine injection 2 mg  2 mg IntraVENous Q2H PRN    ondansetron (ZOFRAN) injection 4 mg  4 mg IntraVENous Q4H PRN    docusate sodium (COLACE) capsule 100 mg  100 mg Oral BID    lisinopriL (PRINIVIL, ZESTRIL) tablet 40 mg  40 mg Oral DAILY    metoclopramide HCl (REGLAN) tablet 5 mg  5 mg Oral BID    sodium chloride (NS) flush 5-40 mL  5-40 mL IntraVENous Q8H    sodium chloride (NS) flush 5-40 mL  5-40 mL IntraVENous PRN    acetaminophen (TYLENOL) tablet 650 mg  650 mg Oral Q6H PRN    Or    acetaminophen (TYLENOL) suppository 650 mg  650 mg Rectal Q6H PRN          LAB AND IMAGING FINDINGS:     Lab Results   Component Value Date/Time    WBC 6.5 07/11/2021 04:30 AM    HGB 13.2 07/11/2021 04:30 AM    PLATELET 075 67/69/4384 04:30 AM     Lab Results   Component Value Date/Time    Sodium 137 07/11/2021 04:30 AM    Potassium 3.8 07/11/2021 04:30 AM    Chloride 106 07/11/2021 04:30 AM    CO2 24 07/11/2021 04:30 AM    BUN 28 (H) 07/11/2021 04:30 AM    Creatinine 0.49 (L) 07/11/2021 04:30 AM    Calcium 8.5 07/11/2021 04:30 AM    Magnesium 2.4 07/12/2021 05:09 AM    Phosphorus 2.1 (L) 07/12/2021 05:09 AM      Lab Results   Component Value Date/Time    Alk. phosphatase 102 07/02/2021 10:48 PM    Protein, total 7.3 07/02/2021 10:48 PM    Albumin 3.6 07/02/2021 10:48 PM    Globulin 3.7 07/02/2021 10:48 PM     Lab Results   Component Value Date/Time    INR 1.0 07/02/2021 10:48 PM    Prothrombin time 10.8 07/02/2021 10:48 PM      No results found for: IRON, FE, TIBC, IBCT, PSAT, FERR   No results found for: PH, PCO2, PO2  No components found for: [place]   Lab Results   Component Value Date/Time    CK 92 06/03/2017 06:45 PM    CK - MB 1.1 06/03/2017 06:45 PM                Total time:   Counseling / coordination time, spent as noted above:   > 50% counseling / coordination?:     Prolonged service was provided for  []30 min   []75 min in face to face time in the presence of the patient, spent as noted above. Time Start:   Time End:   Note: this can only be billed with 37090 (initial) or 58917 (follow up). If multiple start / stop times, list each separately.

## 2021-07-12 NOTE — PROGRESS NOTES
Problem: Dysphagia (Adult)  Goal: *Acute Goals and Plan of Care (Insert Text)  Description: Speech Therapy Goals    Added 7/12/2021  1. Patient will tolerate regular diet/thin liquids with no adverse effects within 7 days. Initiated 7/8/2021  1. Patient will tolerate pureed diet/thin liquids without adverse effects within 7 days. MET 7/12/2021      Outcome: Progressing Towards Goal     Problem: Communication Impaired (Adult)  Goal: *Acute Goals and Plan of Care (Insert Text)  Description: Speech Therapy Goals  Initiated 7/8/2021    1. Patient will participate in automatic speech tasks with 80% accuracy within 7 days. 2. Patient will participate in simple object naming tasks with 80% accuracy within 7 days. Outcome: Progressing Towards Goal     SPEECH LANGUAGE PATHOLOGY DYSPHAGIA AND SPEECH TREATMENT  Patient: Jr Segura (65 y.o. female)  Date: 7/12/2021  Diagnosis: Brain mass [G93.89] <principal problem not specified>  Procedure(s) (LRB):  LEFT FRONTAL CRANIOTOMY WITH RESECTION INTRA CRANIAL METASTASIS WITH BRAIN LAB (URGENT) (Left) 5 Days Post-Op  Precautions:  Fall    ASSESSMENT:  Patient has been tolerating puree diet/thin liquids with no adverse effects. Pt also with improved acceptance of PO. Patient tolerated sips of thin and puree bedside with no difficulties or s/s of aspiration. Pt willing and able to participate in solid trials. Pt with timely and efficient mastication of solid with mild lingual residue. Liquid wash was effective in clearing residue. Given bedside presentation recommend regular diet/thin liquids with precautions as outlined below. Suspect pt is approaching baseline swallow function and therefore SLP will check for diet tolerance x1. Pt also presents with significantly improved expressive language. Pt continues to present with with word finding difficulties and verbal paraphasias however speech is improved in fluency and mean length of utterance.  Patient benfitted from verbal cues and extra processing time. Pt continues to benefit from skilled SLP services for language at this and next level of care. PLAN:  Recommendations and Planned Interventions:  -- regular diet/thin liquids  -- alternate liquids and solids   -- SLP to continue to follow for diet check and language intervention  Patient continues to benefit from skilled intervention to address the above impairments. Continue treatment per established plan of care. Discharge Recommendations: To Be Determined     SUBJECTIVE:   Patient stated Tu Christine?  after hearing an announcement that Nallely Christine has a phone call. OBJECTIVE:   Cognitive and Communication Status:  Neurologic State: Alert  Orientation Level: Oriented X4  Cognition: Follows commands, Appropriate for age attention/concentration  Perception: Appears intact  Perseveration: No perseveration noted  Safety/Judgement: Awareness of environment    Dysphagia Treatment and Interventions:  Oral Assessment:  Oral Assessment  Labial: No impairment  Dentition: Intact; Natural  Oral Hygiene: moist oral mucosa free of secretions  Lingual: No impairment  Velum: No impairment  Mandible: No impairment  P.O. Trials:  Patient Position: upright in bed  Vocal quality prior to P.O.: No impairment  Consistency Presented: Thin liquid;Puree; Solid  How Presented: Self-fed/presented;SLP-fed/presented     Bolus Acceptance: No impairment  Bolus Formation/Control: No impairment     Propulsion: No impairment  Oral Residue: None  Initiation of Swallow: No impairment  Laryngeal Elevation: Functional  Aspiration Signs/Symptoms: None  Pharyngeal Phase Characteristics: No impairment, issues, or problems   Effective Modifications: Alternate liquids/solids  Cues for Modifications: Moderate       Oral Phase Severity: Mild  Pharyngeal Phase Severity : No impairment       Speech Treatment and Interventions: Motor Speech:                       Speech Characteristics: Paraphasias; Word retrieval        Interfering Components: Limited error awareness  Language Comprehension and Expression:  Auditory Comprehension   Auditory Impairment: No   Response to Basic Yes/No Questions (%): 100 %  One-Step Basic Commands (%): 100 %  Verbal Expression  Verbal Expression  Primary Mode of Expression: Verbal  Initiation: No impairment  Automatic Speech Task: No impairment  Repetition: No impairment  Conversation: Fluent  Speech Characteristics: Paraphasias; Word retrieval  Interfering Components: Limited error awareness  Effective Techniques: Provide extra time; Word retrieval strategies  Overall Impairment: Mild-moderate  Cueing type: Verbal  Cueing amount: Moderate         Pain:  Pain Scale 1: Numeric (0 - 10)  Pain Intensity 1: 0       After treatment:   Patient left in no apparent distress in bed, Call bell within reach, Nursing notified, and Caregiver / family present    COMMUNICATION/EDUCATION:   Patient was educated regarding her deficit(s) of aphasia as this relates to her diagnosis of Brain Mass. She demonstrated Fair understanding as evidenced by verbalization of agreement. The patient's plan of care including recommendations, planned interventions, and recommended diet changes were discussed with: Registered nurse.      Thank you,   Norm Ordaz M.S. CF-SLP   Speech Language Pathologist     Time Calculation: 15 mins

## 2021-07-12 NOTE — PROGRESS NOTES
Hospitalist Progress Note  Karlee Lantigua MD  Answering service: [phone] from in house phone      Date of Service:  2021  NAME:  Ron Prakash  :  1932  MRN:  [id number]    Admission Summary:   89F p/w mets to brain  Interval history / Subjective:   Patient seen and examined at bedside, feels well, speech comprehensible, some word finding difficulties, noted at times BP elevated, increased amlodipine, added prn hydralazine. Assessment & Plan:     1. 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 PT/OT. CM start dc planning    2. HTN: - Home lisinopril, atenolol, amlodipine dose increased- Goal SBP <140   3. HLD- Continue atorvastatin   4. CVA hx- Plavix held    Code status: Full  DVT prophylaxis: SCDs  Care Plan discussed with: Patient/Family and Nurse  Disposition: [facility] Problems  Date Reviewed: 2021        Codes Class Noted POA    Brain mass ICD-10-CM: G93.89  ICD-9-CM: 348.89  2021 Unknown            Review of Systems:   Pertinent items are mentioned in interval history. Vital Signs:    Last 24hrs VS reviewed since prior progress note. Most recent are:  Visit Vitals  BP (!) 174/102   Pulse 66   Temp 97.8 °F (36.6 °C)   Resp 18   Ht 5' 4\" (1.626 m)   Wt 56.7 kg (125 lb)   SpO2 97%   BMI 21.46 kg/m²         Intake/Output Summary (Last 24 hours) at 2021 0823  Last data filed at 2021 0685  Gross per 24 hour   Intake 460 ml   Output 450 ml   Net 10 ml        Physical Examination:   Evaluated face to face and examined 21    General:  Alert, oriented, No acute distress.  Frail, elderly [employer]  Card:  S1, S2, No murmurs, good peripheral perfusion, warm peripheries  Resp:  No accessory muscle use, Good AE, no wheezes. no crepitations  Abd:  Soft, non-tender, non-distended, BS+  Extremities:  No cyanosis or clubbing, no significant edema  Neuro:  Grossly normal, no focal neuro deficits, follows commands, speech wnl  Psych:  fair insight, not agitated. Data Review:    Review and/or order of clinical lab test  Review and/or order of tests in the radiology section of [facility]  Review and/or order of tests in the medicine section of [facility]  Labs:     Recent Labs     07/11/21  0430 07/10/21  0510   WBC 6.5 5.4   HGB 13.2 12.2   HCT 38.2 35.3    143*     Recent Labs     07/12/21  0509 07/11/21  0430 07/10/21  0510   NA  --  137 136   K  --  3.8 4.0   CL  --  106 107   CO2  --  24 25   BUN  --  28* 22*   CREA  --  0.49* 0.42*   GLU  --  135* 132*   CA  --  8.5 8.4*   MG 2.4 2.5* 2.2   PHOS 2.1* 2.5* 3.3     No results for input(s): ALT, AP, TBIL, TBILI, TP, ALB, GLOB, GGT, AML, LPSE in the last 72 hours. No lab exists for component: SGOT, GPT, AMYP, HLPSE  No results for input(s): INR, PTP, APTT, INREXT, INREXT in the last 72 hours. No results for input(s): FE, TIBC, PSAT, FERR in the last 72 hours. No results found for: FOL, RBCF   No results for input(s): PH, PCO2, PO2 in the last 72 hours. No results for input(s): CPK, CKNDX, TROIQ in the last 72 hours.     No lab exists for component: CPKMB  Lab Results   Component Value Date/Time    Cholesterol, total 163 02/14/2019 12:00 AM    HDL Cholesterol 57 02/14/2019 12:00 AM    LDL, calculated 88 02/14/2019 12:00 AM    Triglyceride 92 02/14/2019 12:00 AM     No results found for: [facility]  Lab Results   Component Value Date/Time    Color YELLOW/STRAW 05/24/2021 11:30 AM    Appearance CLEAR 05/24/2021 11:30 AM    Specific gravity 1.018 05/24/2021 11:30 AM    Specific gravity 1.020 06/03/2017 06:45 PM    pH (UA) 6.0 05/24/2021 11:30 AM    Protein 100 (A) 05/24/2021 11:30 AM    Glucose Negative 05/24/2021 11:30 AM    Ketone Negative 05/24/2021 11:30 AM    Bilirubin NEGATIVE  06/03/2017 06:45 PM    Urobilinogen 0.2 05/24/2021 11:30 AM    Nitrites Negative 05/24/2021 11:30 AM    Leukocyte Esterase MODERATE (A) 05/24/2021 11:30 AM    Epithelial cells MODERATE (A) 05/24/2021 11:30 AM    Bacteria 1+ (A) 05/24/2021 11:30 AM    WBC 5-10 05/24/2021 11:30 AM    RBC 0-5 05/24/2021 11:30 AM     Medications Reviewed:     Current Facility-Administered Medications   Medication Dose Route Frequency    hydrALAZINE (APRESOLINE) 20 mg/mL injection 10 mg  10 mg IntraVENous Q6H PRN    amLODIPine (NORVASC) tablet 10 mg  10 mg Oral DAILY    potassium phosphate 20 mmol in 0.9% sodium chloride 250 mL infusion   IntraVENous ONCE    dexamethasone (DECADRON) 4 mg/mL injection 4 mg  4 mg IntraVENous Q8H    polyethylene glycol (MIRALAX) packet 17 g  17 g Oral DAILY    levETIRAcetam (KEPPRA) 1,000 mg in 0.9% sodium chloride 100 mL IVPB  1,000 mg IntraVENous Q12H    atenoloL (TENORMIN) tablet 25 mg  25 mg Oral DAILY    atorvastatin (LIPITOR) tablet 20 mg  20 mg Oral DAILY    pantoprazole (PROTONIX) tablet 40 mg  40 mg Oral ACB    sodium chloride (NS) flush 5-40 mL  5-40 mL IntraVENous Q8H    sodium chloride (NS) flush 5-40 mL  5-40 mL IntraVENous PRN    acetaminophen (TYLENOL) solution 650 mg  650 mg Oral Q4H PRN    HYDROcodone-acetaminophen (NORCO) 5-325 mg per tablet 1 Tablet  1 Tablet Oral Q4H PRN    morphine injection 2 mg  2 mg IntraVENous Q2H PRN    ondansetron (ZOFRAN) injection 4 mg  4 mg IntraVENous Q4H PRN    docusate sodium (COLACE) capsule 100 mg  100 mg Oral BID    lisinopriL (PRINIVIL, ZESTRIL) tablet 40 mg  40 mg Oral DAILY    metoclopramide HCl (REGLAN) tablet 5 mg  5 mg Oral BID    sodium chloride (NS) flush 5-40 mL  5-40 mL IntraVENous Q8H    sodium chloride (NS) flush 5-40 mL  5-40 mL IntraVENous PRN    acetaminophen (TYLENOL) tablet 650 mg  650 mg Oral Q6H PRN    Or    acetaminophen (TYLENOL) suppository 650 mg  650 mg Rectal Q6H PRN ______________________________________________________________________  EXPECTED LENGTH OF STAY: 3d 0h  ACTUAL LENGTH OF STAY:          Betty Rubalcava MD

## 2021-07-12 NOTE — PROGRESS NOTES
[id number] Sanderss, NP informed of pt's hypertension, orders to give morning po antihypertensives early.

## 2021-07-12 NOTE — WOUND CARE
Wound Care Note:     New consult placed by nurse request for right shin skin tear    Chart shows:  Admitted for brain mass s/p left frontal craniotomy for resection of brain tumor 7/7/21  Past Medical History:   Diagnosis Date    Essential hypertension     Hypercholesterolemia     Menopause     Stroke ([facility] [address] 2011     WBC = 6.5 on 7/11/21  Admitted from [facility] [place] [facility]:   Patient is A&O x 4, communicative, incontinent with some assistance needed in repositioning. Bed: [place]  Patient wearing briefs for incontinence. Diet: Adult diet dysphagia- pureed with oral nutritional supplements  Patient reports no pain. Bilateral heels and sacral skin intact and without erythema. Bilateral buttocks skin intact with blanchable erythema. Palpable DP pulses bilaterally. 1. Right anterior lower leg skin tear measures 1.5 cm x 2 cm x 0.1 cm, wound bed is pink, scant sero/sang drainage, wound edges are open, joann-wound intact without erythema. Xeroform gauze, 4 x 4 and roll gauze applied. 2.  Right posterior lower leg with area of ecchymosis with two small crusted wounds, area is approximately 5 cm x 3 cm, no drainage, wound edges are open, joann-wound intact without erythema. Xeroform gauze, 4 x 4 and roll gauze applied. Spoke with Dr. Bryant Mcwilliams, wound care orders obtained. Patient repositioned on right side. Heels offloaded on pillows. Recommendations:    Right lower leg (anterior and posterior)- Every other day cleanse with normal saline, wipe wound bed clean and dry, apply Xeroform gauze that has been folded in half, cover with 4 x 4's and secure with roll gauze and tape. Sacrum, bilateral buttocks and bilateral heels- Every 8 hours liberally apply Venelex ointment    Skin Care & Pressure Prevention:  Minimize layers of linen/pads under patient to optimize support surface.     Turn/reposition approximately every 2 hours and offload heels.   Manage incontinence / promote continence   Nourishing Skin Cream to dry skin, minimize use of briefs when able    Discussed above plan with patient & Destiny Zavaleta RN    Transition of Care: Plan to follow as needed while admitted to hospital.    NEEMA ShinN, RN, [facility], [facility].  office [phone]  pager [phone] or call  to page

## 2021-07-12 NOTE — PROGRESS NOTES
Transition of Care Plan   RUR- Low  16%   DISPOSITION: SNF at [place] [address] - pending referral and auth   F/U with PCP/Specialist     Transport: [facility]    CM discussed patient's disposition with Dr. Domenic Eugene. Plan to have patient work with PT/OT/SP again today. Recommendation 7/9 was IPR. CM to follow progress and assist as recommended. 10:51am: CM spoke with patient's sons, Rosina Pillai and Jaun Winter regarding discharge plan. CM reiterated PT/OT/MD recommendation of IPR, they are open to it. Patient is independent living at [place] [address]; which does not have IPR on campus. CM left message for DON to inquire about what level of therapy can be provided in house. Patient's son, Rosina Pillai, requested to meet with CM once he is at the bedside. 11:31am: CM spoke with patient's son, Rosina Pillai regarding discharge plan. He would like patient to continue IV Keytruda and receive PT/OT/SP as well. CM spoke with Gatha Aase, DON at [place] [address] regarding options for discahrge. Patient may either go to SNF setting and be transported out for treatment or receive PT/OT/SP in outpatient setting while remaninig in her independent living apartment. CM shared these options with patient's son, he would like to discuss options with his brother and get back to this CM today. CM reiterated that we would need insurance auth if SNF setting is chosen and that would need to start today. 1:41pm: CM received call from patient's son, Rosina Pillai. They are in agreement to proceed with SNF placement at [facility]. CM faxed clinicals to [phone] for their review. They will need to start insurance auth.    2:28pm: CM attempt x4 to fax clinicals to [facility]. CAITLIN confirmed this fax # is correct. 3:25pm: CM attempt x6 to send clinicals to [facility], unsuccessful. CM obtained another fax, [phone].     3:42pm: Other fax # unsuccessful. CAITLIN obtained Ravinder Pate's email: [email or web address] and scanned clinicals. EyJAKE Charlton.

## 2021-07-12 NOTE — PROGRESS NOTES
Bedside and Verbal shift change report given to CUCO Figueroa (oncoming nurse) by Noemi Sorto (offgoing nurse). Report included the following information SBAR, Kardex, Intake/Output, MAR, Recent Results, Cardiac Rhythm NSR and Dual Neuro Assessment.

## 2021-07-12 NOTE — PROGRESS NOTES
Problem: Mobility Impaired (Adult and Pediatric)  Goal: *Acute Goals and Plan of Care (Insert Text)  Description: FUNCTIONAL STATUS PRIOR TO ADMISSION: Pt poor historian. Reports living alone in ILF and independence with mobility and ADLs. HOME SUPPORT PRIOR TO ADMISSION: Unclear level of support available     Physical Therapy Goals  Initiated 7/8/2021  1. Patient will move from supine to sit and sit to supine , scoot up and down, and roll side to side in bed with moderate assistance  within 7 day(s). 2.  Patient will transfer from bed to chair and chair to bed with maximal assistance using the least restrictive device within 7 day(s). 3.  Patient will perform sit to stand with maximal assistance within 7 day(s). 4.  Patient will ambulate with maximal assistance for 15 feet with the least restrictive device within 7 day(s). Outcome: Progressing Towards Goal  PHYSICAL THERAPY TREATMENT  Patient: Keo Davis (69 y.o. female)  Date: 7/12/2021  Diagnosis: Brain mass [G93.89] <principal problem not specified>  Procedure(s) (LRB):  LEFT FRONTAL CRANIOTOMY WITH RESECTION INTRA CRANIAL METASTASIS WITH BRAIN LAB (URGENT) (Left) 5 Days Post-Op  Precautions: Fall  Chart, physical therapy assessment, plan of care and goals were reviewed. ASSESSMENT  Patient continues with skilled PT services and is progressing towards goals - remains limited by aphasia (improved since last session), impaired sitting and standing balance, weakness (R>L), decreased activity tolerance, impulsivity, decreased safety awareness, decreased attention to task, and overall decline in functional mobility. Pt pleasant and agreeable to work with therapy, although required frequent redirection to attend to current task. She transferred supine>sit with min-modA x2, sit<>stand with min-modA x2, and transferred bed>chair with modA x2 demonstrating forward flexed posture, shuffled gait, and poor safety awareness.   Ended session sitting in chair with all needs met. Recommending SNF upon discharge. Current Level of Function Impacting Discharge (mobility/balance): modA x2 for transfers    Other factors to consider for discharge: independent baseline, medical course         PLAN :  Patient continues to benefit from skilled intervention to address the above impairments. Continue treatment per established plan of care. to address goals. Recommendation for discharge: (in order for the patient to meet his/her long term goals)  Therapy up to 5 days/week in SNF setting    This discharge recommendation:  Has been made in collaboration with the attending provider and/or case management    IF patient discharges home will need the following DME: to be determined (TBD)       SUBJECTIVE:   Patient stated I met my , and I thought he smelled nice so I  him.     OBJECTIVE DATA SUMMARY:   Critical Behavior:  Neurologic State: Alert  Orientation Level: Oriented X4  Cognition: Follows commands, Appropriate for age attention/concentration  Safety/Judgement: Awareness of environment  Functional Mobility Training:  Bed Mobility:  Supine to Sit: Minimum assistance; Moderate assistance;Assist x2  Scooting: Minimum assistance    Transfers:  Sit to Stand: Minimum assistance; Moderate assistance;Assist x2  Stand to Sit: Moderate assistance;Assist x2  Bed to Chair: Moderate assistance;Assist x2    Balance:  Sitting: Impaired  Sitting - Static: Fair (occasional)  Sitting - Dynamic: Poor (constant support)  Standing: Impaired; With support  Standing - Static: Poor;Constant support  Standing - Dynamic : Poor;Constant support    Activity Tolerance:   Fair and Poor    After treatment patient left in no apparent distress:   Sitting in chair, Call bell within reach, and Bed / chair alarm activated    COMMUNICATION/COLLABORATION:   The patients plan of care was discussed with: Occupational therapist and Registered nurse.      Jaleelb Andrea PT, DPT   Time Calculation: 16 mins

## 2021-07-12 NOTE — PROGRESS NOTES
Neurosurgery Progress Note  [facility]          Admit Date: 2021   LOS: 10 days        Daily Progress Note: 2021    POD: 5 Day Post-Op    S/P: Procedure(s):  LEFT FRONTAL CRANIOTOMY WITH RESECTION INTRA CRANIAL METASTASIS WITH BRAIN LAB (URGENT)    HPI: The patient presented to Dr. Sarah Hayes oncology at hospitals after a brain MRI showed multiple brain mets of unknown origin. She had nausea and vomiting for a month and lost about 20 pounds. She was referred to Dr. Jeovanny Red after being started on steroids, but then was referred to the ER for work-up and evaluation. The patient's CT scans of the chest/abdomen/pelvis were unrevealing for other sites of disease. Due to the family's desire to be aggressive, after discussing risks, benefits, and alternatives of surgery, they decided to proceed with resection of the left frontal tumor for diagnosis. She underwent a left frontal craniotomy with Dr. Мария Lomeli yesterday. Prelim path consistent with melanoma. She presents in the ICU with non-fluent expressive/receptive aphasia and some right sided weakness this morning. She does answer yes/no questions appropriately. Subjective:   Patient's aphasia is improving. Her right weakness is also improving. Still slow with responses but better. Son Joe Medrano at bedside. Plans to return to [facility] in [address] to start treatment noted. Denies HA, nausea, vomiting. Objective:     Vital signs  Temp (24hrs), Av.7 °F (36.5 °C), Min:97.4 °F (36.3 °C), Max:98.1 °F (36.7 °C)   701 - 1900  In: 2559.9 [P.O.:2470; I.V.:89.9]  Out: -   07/10 1901 -  0700  In: 960 [P.O.:760;  I.V.:200]  Out: 1600 [Urine:1600]    Visit Vitals  /77 (BP 1 Location: Right upper arm, BP Patient Position: At rest)   Pulse 73   Temp 98.1 °F (36.7 °C)   Resp 21   Ht 5' 4\" (1.626 m)   Wt 56.7 kg (125 lb)   SpO2 95%   BMI 21.46 kg/m²    O2 Flow Rate (L/min): 2 l/min O2 Device: None (Room air)     Pain control  Pain Assessment  Pain Scale 1: Numeric (0 - 10)  Pain Intensity 1: 0  Pain Intervention(s) 1: Rest    PT/OT  Gait                 Physical Exam:  Gen:NAD. Neuro: Awake. Ox3. Follows commands to give me thumbs up. Speech mild non-fluent expressive/receptive aphasia. Affect flat  PERRL. EOMI. Face symmetric. Tongue midline. BARCENAS spontaneously L>R. Gait deferred. Skin: Scalp incision C/D? I with sutures in place. CT head without contrast on 07/08/21 shows interval resection superior left frontal lobe lesion with small amount of pneumocephalus and hemorrhage at the resection site. No mass effect/edema. No  midline shift. Chronic white matter disease, other hyperdense metastases, and ventriculomegaly all unchanged. 24 hour results:    Recent Results (from the past 24 hour(s))   MAGNESIUM    Collection Time: 07/12/21  5:09 AM   Result Value Ref Range    Magnesium 2.4 1.6 - 2.4 mg/dL   PHOSPHORUS    Collection Time: 07/12/21  5:09 AM   Result Value Ref Range    Phosphorus 2.1 (L) 2.6 - 4.7 MG/DL          Assessment:     Active Problems:    Brain mass (7/2/2021)        Plan:   1. Brain metastases   - s/p crani 07/07. Path consistent with melanoma   - cont decadron   - cont keppra. EEG negative. Will begin decreasing keppra dosing.   - PT/OT/Speech   - normalize and mobilize   - ok to go to SNF when accepted from NSGY standpoint   - will need adjuvant radiosurgery to other lesions. Rad onc to follow-up with patient. 2. Brain compression with cerebral edema   - due to #1   - plans as above  3. Expressive/receptive aphasia   - due to #1, 2   - speech therapy  4. HTN   - cont atenolol, lisinopril   - SBP<140   - hydralazine/labetalol PRN  5. GERD   - cont PPI    Activity: up with assist  DVT ppx: SCDs  Dispo: SNF    Plan d/w Dr. Magda Marina, nurse, hospitalist, son Shelly Conception at bedside.       Myla Vitale NP

## 2021-07-12 NOTE — PROGRESS NOTES
[address] [facility] visit. Mrs. Ray Collins declined communion today. She had two visitors. Prayer offered.     HERMES Downey, RN, ACSW, LCSW   Page:  [phone]

## 2021-07-12 NOTE — PROGRESS NOTES
Problem: Falls - Risk of  Goal: *Absence of Falls  Description: Document Dimas Cárdenas Fall Risk and appropriate interventions in the flowsheet.   Outcome: Progressing Towards Goal  Note: Fall Risk Interventions:  Mobility Interventions: Bed/chair exit alarm, Communicate number of staff needed for ambulation/transfer, OT consult for ADLs, Patient to call before getting OOB, PT Consult for mobility concerns, PT Consult for assist device competence, Strengthening exercises (ROM-active/passive), Utilize walker, cane, or other assistive device, Utilize gait belt for transfers/ambulation    Mentation Interventions: Adequate sleep, hydration, pain control, Door open when patient unattended, Evaluate medications/consider consulting pharmacy, Increase mobility, More frequent rounding, Reorient patient, Room close to nurse's station, Toileting rounds, Update white board    Medication Interventions: Assess postural VS orthostatic hypotension, Bed/chair exit alarm, Evaluate medications/consider consulting pharmacy, Patient to call before getting OOB, Teach patient to arise slowly, Utilize gait belt for transfers/ambulation    Elimination Interventions: Bed/chair exit alarm, Call light in reach, Elevated toilet seat, Patient to call for help with toileting needs, Stay With Me (per policy), Toilet paper/wipes in reach, Toileting schedule/hourly rounds    History of Falls Interventions: Bed/chair exit alarm, Consult care management for discharge planning, Door open when patient unattended, Evaluate medications/consider consulting pharmacy, Investigate reason for fall, Room close to nurse's station, Utilize gait belt for transfer/ambulation, Assess for delayed presentation/identification of injury for 48 hrs (comment for end date), Vital signs minimum Q4HRs X 24 hrs (comment for end date)         Problem: Patient Education: Go to Patient Education Activity  Goal: Patient/Family Education  Outcome: Progressing Towards Goal     Problem: Brain Tumor Day 3 to Discharge  Goal: Activity/Safety  Outcome: Progressing Towards Goal  Goal: Nutrition/Diet  Outcome: Progressing Towards Goal  Goal: Discharge Planning  Outcome: Progressing Towards Goal  Goal: Medications  Outcome: Progressing Towards Goal  Goal: Treatments/Interventions/Procedures  Outcome: Progressing Towards Goal  Goal: Progressive Mobility and Function  Outcome: Progressing Towards Goal  Goal: Psychosocial  Outcome: Progressing Towards Goal     Problem: Brain Tumor Discharge Outcomes  Goal: *Neurologic stability  Outcome: Progressing Towards Goal  Goal: *Progress independence mobility/activities (eg: Mobility precautions)  Outcome: Progressing Towards Goal  Goal: *Adequate oxygenation  Outcome: Progressing Towards Goal  Goal: *Tolerates nutrition therapy  Outcome: Progressing Towards Goal  Goal: *Verbalizes understanding and describes medication purposes and frequencies  Outcome: Progressing Towards Goal  Goal: *Verbalizes understanding of type and use of pain medication  Outcome: Progressing Towards Goal  Goal: *Describes home care/support arrangements established based on need  Outcome: Progressing Towards Goal  Goal: *Describes available resources and support systems  Outcome: Progressing Towards Goal     Problem: Discharge Planning  Goal: *Discharge to safe environment  Outcome: Progressing Towards Goal  Goal: *Knowledge of medication management  Outcome: Progressing Towards Goal  Goal: *Knowledge of discharge instructions  Outcome: Progressing Towards Goal     Problem: Patient Education: Go to Patient Education Activity  Goal: Patient/Family Education  Outcome: Progressing Towards Goal     Problem: Pressure Injury - Risk of  Goal: *Prevention of pressure injury  Description: Document Arpan Scale and appropriate interventions in the flowsheet.   Outcome: Progressing Towards Goal  Note: Pressure Injury Interventions:  Sensory Interventions: Assess changes in LOC, Assess need for specialty bed, Avoid rigorous massage over bony prominences, Check visual cues for pain, Discuss PT/OT consult with provider, Float heels, Keep linens dry and wrinkle-free, Maintain/enhance activity level, Minimize linen layers, Monitor skin under medical devices, Sit a 90-degree angle/use footstool if needed, Turn and reposition approx. every two hours (pillows and wedges if needed), Use 30-degree side-lying position    Moisture Interventions: Absorbent underpads, Apply protective barrier, creams and emollients, Assess need for specialty bed, Check for incontinence Q2 hours and as needed, Internal/External urinary devices, Maintain skin hydration (lotion/cream), Minimize layers, Moisture barrier, Offer toileting Q_hr    Activity Interventions: Assess need for specialty bed, Increase time out of bed, PT/OT evaluation    Mobility Interventions: Assess need for specialty bed, Float heels, HOB 30 degrees or less, PT/OT evaluation, Turn and reposition approx.  every two hours(pillow and wedges)    Nutrition Interventions: Document food/fluid/supplement intake, Discuss nutritional consult with provider, Offer support with meals,snacks and hydration    Friction and Shear Interventions: Apply protective barrier, creams and emollients, Feet elevated on foot rest, Foam dressings/transparent film/skin sealants, HOB 30 degrees or less, Lift sheet, Minimize layers, Sit at 90-degree angle                Problem: Patient Education: Go to Patient Education Activity  Goal: Patient/Family Education  Outcome: Progressing Towards Goal

## 2021-07-12 NOTE — PROGRESS NOTES
Pt is in hospital with plans for [facility] rehab  New dx of metastatic melanoma  Need to make sure molecular testing is being don  Pt of Dr Redd Mays and will fu with him at [facility]

## 2021-07-13 LAB
MAGNESIUM SERPL-MCNC: 2.4 MG/DL (ref 1.6–2.4)
PHOSPHATE SERPL-MCNC: 2.1 MG/DL (ref 2.6–4.7)

## 2021-07-13 PROCEDURE — 74011250637 HC RX REV CODE- 250/637: Performed by: SPECIALIST

## 2021-07-13 PROCEDURE — 65660000000 HC RM CCU STEPDOWN

## 2021-07-13 PROCEDURE — 92507 TX SP LANG VOICE COMM INDIV: CPT

## 2021-07-13 PROCEDURE — 36415 COLL VENOUS BLD VENIPUNCTURE: CPT

## 2021-07-13 PROCEDURE — 92526 ORAL FUNCTION THERAPY: CPT

## 2021-07-13 PROCEDURE — 83735 ASSAY OF MAGNESIUM: CPT

## 2021-07-13 PROCEDURE — 84100 ASSAY OF PHOSPHORUS: CPT

## 2021-07-13 PROCEDURE — 74011250636 HC RX REV CODE- 250/636: Performed by: INTERNAL MEDICINE

## 2021-07-13 PROCEDURE — 94762 N-INVAS EAR/PLS OXIMTRY CONT: CPT

## 2021-07-13 PROCEDURE — 74011250636 HC RX REV CODE- 250/636: Performed by: NURSE PRACTITIONER

## 2021-07-13 PROCEDURE — 99233 SBSQ HOSP IP/OBS HIGH 50: CPT | Performed by: INTERNAL MEDICINE

## 2021-07-13 PROCEDURE — 74011250637 HC RX REV CODE- 250/637: Performed by: NURSE PRACTITIONER

## 2021-07-13 PROCEDURE — 74011250637 HC RX REV CODE- 250/637: Performed by: INTERNAL MEDICINE

## 2021-07-13 RX ADMIN — LEVETIRACETAM 750 MG: 500 TABLET ORAL at 08:37

## 2021-07-13 RX ADMIN — HYDRALAZINE HYDROCHLORIDE 10 MG: 20 INJECTION, SOLUTION INTRAMUSCULAR; INTRAVENOUS at 02:31

## 2021-07-13 RX ADMIN — Medication 10 ML: at 06:44

## 2021-07-13 RX ADMIN — Medication 10 ML: at 14:00

## 2021-07-13 RX ADMIN — POLYETHYLENE GLYCOL 3350 17 G: 17 POWDER, FOR SOLUTION ORAL at 08:36

## 2021-07-13 RX ADMIN — METOCLOPRAMIDE 5 MG: 10 TABLET ORAL at 17:54

## 2021-07-13 RX ADMIN — PANTOPRAZOLE SODIUM 40 MG: 40 TABLET, DELAYED RELEASE ORAL at 06:45

## 2021-07-13 RX ADMIN — DEXAMETHASONE 4 MG: 4 TABLET ORAL at 08:37

## 2021-07-13 RX ADMIN — LEVETIRACETAM 750 MG: 500 TABLET ORAL at 17:54

## 2021-07-13 RX ADMIN — ATORVASTATIN CALCIUM 20 MG: 20 TABLET, FILM COATED ORAL at 08:37

## 2021-07-13 RX ADMIN — DOCUSATE SODIUM 100 MG: 100 CAPSULE, LIQUID FILLED ORAL at 08:37

## 2021-07-13 RX ADMIN — Medication: at 06:44

## 2021-07-13 RX ADMIN — METOCLOPRAMIDE 5 MG: 10 TABLET ORAL at 08:37

## 2021-07-13 RX ADMIN — ATENOLOL 25 MG: 25 TABLET ORAL at 08:37

## 2021-07-13 RX ADMIN — AMLODIPINE BESYLATE 10 MG: 5 TABLET ORAL at 08:36

## 2021-07-13 RX ADMIN — LISINOPRIL 40 MG: 20 TABLET ORAL at 08:37

## 2021-07-13 RX ADMIN — Medication: at 22:11

## 2021-07-13 RX ADMIN — Medication: at 14:00

## 2021-07-13 RX ADMIN — DIBASIC SODIUM PHOSPHATE, MONOBASIC POTASSIUM PHOSPHATE AND MONOBASIC SODIUM PHOSPHATE 2 TABLET: 852; 155; 130 TABLET ORAL at 08:36

## 2021-07-13 RX ADMIN — DEXAMETHASONE 4 MG: 4 TABLET ORAL at 22:10

## 2021-07-13 RX ADMIN — Medication 10 ML: at 22:12

## 2021-07-13 RX ADMIN — DOCUSATE SODIUM 100 MG: 100 CAPSULE, LIQUID FILLED ORAL at 17:54

## 2021-07-13 NOTE — PROGRESS NOTES
[facility] [place] [address] [address], [address]  W: [phone]  F: [phone]    Reason for Visit:   Ron Prakash is a 80 y.o. female who is seen in hospital for fu metastatic melanoma     Treatment History:   · 6/30/2021: MRI Brain w/wo Cont - multiple (at least 15) supratentorial and infratentorial hemorrhagic metastases   · 7/7/21: left frontal craniotomy and tumor resection - pathology + for metastatic melanoma    History of Present Illness:     Pt seen by me today for fu metastatic melanoma. Physician son at bedside. Pt is quiet in bed. Son states weak overall. Son states plan is for immunotherapy at hospitals after hospital d/c.   Markers still pending on path. Pt denies pain. Trying to eat some. No fevers/ chills/ chest pain/ SOB/ nausea/ vomiting/diarrhea/      Last visit:  Patient is a 80 y.o. F who presented to the ED on 7/2/21 at the instruction of Dr. Karma Sales due to new hemorrhagic brain lesions concerning for metastatic process. CT A/P previously performed due to nausea/vomiting and constipation for several months revealing no source of primary disease. She had upper and lower endoscopy which were unrevealing. CT chest performed revealing 4mm lung nodule but no definitive source of disease. Last mammogram 7/9/2020 which was benign. She had some mild confusion on arrival     States lives in a community. Has 3 sons. No family at bedside. Interval History:   Patient seen today for follow up new brain lesions. She is s/p tumor resection and pathology + metastatic melanoma. She is aphasic and says \"Yes\" and \"Fine\" to any of my questions. She occasionally follows commands.        Past Medical History:   Diagnosis Date    Essential hypertension     Hypercholesterolemia     Malignant melanoma metastatic to brain ([address] 7/12/2021    Menopause     Stroke ([facility] 2011      Past Surgical History:   Procedure Laterality Date    COLONOSCOPY N/A 2021    COLONOSCOPY performed by Rocio Ivory MD at [facility]  2021         HX COLONOSCOPY  2010    HX GYN       VI,Para V,SAB i    UPPER GI ENDOSCOPY,BIOPSY  2021           Social History     Tobacco Use    Smoking status: Former Smoker     Packs/day: 0.25     Years: 25.00     Pack years: 6.25     Types: Cigarettes     Start date:      Quit date: 1990     Years since quittin.8    Smokeless tobacco: Never Used   Substance Use Topics    Alcohol use: Not Currently     Comment: stopped May 2021      Family History   Problem Relation Age of Onset    Parkinson's Disease Mother     Cancer Father         Gastric     Current Facility-Administered Medications   Medication Dose Route Frequency    amLODIPine (NORVASC) tablet 10 mg  10 mg Oral DAILY    hydrALAZINE (APRESOLINE) 20 mg/mL injection 10 mg  10 mg IntraVENous Q6H PRN    balsam peru-castor oiL (VENELEX) ointment   Topical Q8H    dexAMETHasone (DECADRON) tablet 4 mg  4 mg Oral Q12H    [START ON 2021] dexAMETHasone (DECADRON) tablet 2 mg  2 mg Oral Q12H    levETIRAcetam (KEPPRA) tablet 750 mg  750 mg Oral BID    polyethylene glycol (MIRALAX) packet 17 g  17 g Oral DAILY    atenoloL (TENORMIN) tablet 25 mg  25 mg Oral DAILY    atorvastatin (LIPITOR) tablet 20 mg  20 mg Oral DAILY    pantoprazole (PROTONIX) tablet 40 mg  40 mg Oral ACB    sodium chloride (NS) flush 5-40 mL  5-40 mL IntraVENous Q8H    sodium chloride (NS) flush 5-40 mL  5-40 mL IntraVENous PRN    acetaminophen (TYLENOL) solution 650 mg  650 mg Oral Q4H PRN    HYDROcodone-acetaminophen (NORCO) 5-325 mg per tablet 1 Tablet  1 Tablet Oral Q4H PRN    morphine injection 2 mg  2 mg IntraVENous Q2H PRN    ondansetron (ZOFRAN) injection 4 mg  4 mg IntraVENous Q4H PRN    docusate sodium (COLACE) capsule 100 mg  100 mg Oral BID    lisinopriL (PRINIVIL, ZESTRIL) tablet 40 mg  40 mg Oral DAILY    metoclopramide HCl (REGLAN) tablet 5 mg  5 mg Oral BID    sodium chloride (NS) flush 5-40 mL  5-40 mL IntraVENous Q8H    sodium chloride (NS) flush 5-40 mL  5-40 mL IntraVENous PRN    acetaminophen (TYLENOL) tablet 650 mg  650 mg Oral Q6H PRN    Or    acetaminophen (TYLENOL) suppository 650 mg  650 mg Rectal Q6H PRN      Allergies   Allergen Reactions    Statins-Hmg-Coa Reductase Inhibitors Other (comments)     Other reaction(s): Other (comments)  Severs leg pain(Zetia, Crestor)  Severs leg pain(Zetia, Crestor)        Review of Systems: A complete review of systems was obtained, negative except as described above. Physical Exam:     Visit Vitals  /79   Pulse 67   Temp 97.7 °F (36.5 °C)   Resp 20   Ht 5' 4\" (1.626 m)   Wt 125 lb (56.7 kg)   SpO2 95%   BMI 21.46 kg/m²     ECOG PS: 2  General: No distress  Eyes: PERRL, anicteric sclerae  HENT: Atraumatic  Neck: Supple  Respiratory:  normal respiratory effort  MS: in bed, moves all extremities  Skin:  Healing cranial wound. Normal temperature, turgor, and texture. Psych: sleepy, aphasic     Results:     Lab Results   Component Value Date/Time    WBC 6.5 07/11/2021 04:30 AM    HGB 13.2 07/11/2021 04:30 AM    HCT 38.2 07/11/2021 04:30 AM    PLATELET 567 71/76/3558 04:30 AM    MCV 86.6 07/11/2021 04:30 AM    ABS. NEUTROPHILS 5.0 07/11/2021 04:30 AM     Lab Results   Component Value Date/Time    Sodium 137 07/11/2021 04:30 AM    Potassium 3.8 07/11/2021 04:30 AM    Chloride 106 07/11/2021 04:30 AM    CO2 24 07/11/2021 04:30 AM    Glucose 135 (H) 07/11/2021 04:30 AM    BUN 28 (H) 07/11/2021 04:30 AM    Creatinine 0.49 (L) 07/11/2021 04:30 AM    GFR est AA >60 07/11/2021 04:30 AM    GFR est non-AA >60 07/11/2021 04:30 AM    Calcium 8.5 07/11/2021 04:30 AM     Lab Results   Component Value Date/Time    Bilirubin, total 0.5 07/02/2021 10:48 PM    ALT (SGPT) 33 07/02/2021 10:48 PM    Alk.  phosphatase 102 07/02/2021 10:48 PM    Protein, total 7.3 07/02/2021 10:48 PM    Albumin 3.6 07/02/2021 10:48 PM    Globulin 3.7 07/02/2021 10:48 PM     CT A/P 5/27/2021  IMPRESSION  No acute intraperitoneal process is identified. Please see above for additional nonemergent incidental findings. MRI brain 6/30/2021  IMPRESSION  1. Multiple (at least 15) supratentorial and infratentorial hemorrhagic  metastases, with some of the largest examples as above. Overall no midline shift  or herniation. MRI brain without contrast 6/30/2021  IMPRESSION  1. Multiple T1 hyperintense lesions in the cerebellum and cerebral hemispheres. Associated edema and relatively mild mass effect. Appearance is suggestive of  multiple metastasis from melanoma. Multiple subacute hemorrhages are less  likely. 2. Chronic age-related volume loss and white matter disease noted. 3. MRI technologists will have patient taken an be seen in the ED as this was  not unexpected finding. Assessment/Plan:   1) Metastatic Melanoma with mets to brain  MRI Brain w/wo Cont performed 6/30/2021 showed multiple (at least 15) supratentorial and infratentorial hemorrhagic metastases - concerning for malignant melanoma? Sent here from [facility] oncology and originally seen by Dr. Terra Horne. CT C/A/P with no definitive site of origin. S/p craniotomy with tumor resection on 7/7 - pathology + for malignant melanoma  [facility] following with possible plans for GK therapy. Palliative care consulted. Outpatient PET ordered. Pt seen today for fu in hospital.   Physician son at bedside. Pt quiet in bed. Reviewed path/ dx with son today. Discussed treatment options of immunotherapy vs targeted therapy vs supportive care. Son is realistic about treatment. Wants to try single agent immunotherapy. Not sure pt will do radiation. Pt will fu with oncology at [facility] at d/c from here. Son already in contact with Dr Terra Horne. 2) Nausea/Vomiting   Improved with decadron  Resolved. 3) Constipation   Bowel regimen per primary team    4) Aphasia. Speech therapy. 5) Psychosocial   Mood quiet. Lives near [facility]. Has supportive children. Doctor son here today    Will follow as needed. Call for questions.        Signed By: Hoa Turner DO

## 2021-07-13 NOTE — PROGRESS NOTES
Problem: Falls - Risk of  Goal: *Absence of Falls  Description: Document Matilde Benitez Fall Risk and appropriate interventions in the flowsheet.   Outcome: Progressing Towards Goal  Note: Fall Risk Interventions:  Mobility Interventions: Bed/chair exit alarm, Communicate number of staff needed for ambulation/transfer, OT consult for ADLs, Patient to call before getting OOB, PT Consult for mobility concerns, PT Consult for assist device competence, Strengthening exercises (ROM-active/passive), Utilize walker, cane, or other assistive device, Utilize gait belt for transfers/ambulation    Mentation Interventions: Adequate sleep, hydration, pain control, Door open when patient unattended, Evaluate medications/consider consulting pharmacy, Increase mobility, More frequent rounding, Reorient patient, Room close to nurse's station, Toileting rounds, Update white board    Medication Interventions: Assess postural VS orthostatic hypotension, Bed/chair exit alarm, Evaluate medications/consider consulting pharmacy, Patient to call before getting OOB, Teach patient to arise slowly, Utilize gait belt for transfers/ambulation    Elimination Interventions: Bed/chair exit alarm, Call light in reach, Elevated toilet seat, Patient to call for help with toileting needs, Stay With Me (per policy), Toilet paper/wipes in reach, Toileting schedule/hourly rounds    History of Falls Interventions: Bed/chair exit alarm, Consult care management for discharge planning, Door open when patient unattended, Evaluate medications/consider consulting pharmacy, Investigate reason for fall, Room close to nurse's station, Utilize gait belt for transfer/ambulation, Assess for delayed presentation/identification of injury for 48 hrs (comment for end date), Vital signs minimum Q4HRs X 24 hrs (comment for end date)

## 2021-07-13 NOTE — PROGRESS NOTES
Bedside and Verbal shift change report given to Macario Ruff RN (oncoming nurse) by Nat Rojas RN (offgoing nurse). Report included the following information SBAR, Kardex, ED Summary, Intake/Output, MAR, Cardiac Rhythm NSR, Quality Measures and Dual Neuro Assessment.

## 2021-07-13 NOTE — PROGRESS NOTES
Hospitalist Progress Note  Dandre Madrid MD  Answering service: [phone] from in house phone      Date of Service:  2021  NAME:  Mateus Kelley  :  1932  MRN:  [id number]    Admission Summary:   89F p/w mets to brain  Interval history / Subjective:   Patient seen and examined at bedside, feels well, no acute complaints. She 'would like to sleep some more'. A/w SNF placement. Assessment & Plan:     1. 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 PT/OT. 2. HTN: - Home lisinopril, atenolol, amlodipine dose increased- Goal SBP <140   3. HLD- Continue atorvastatin   4. CVA hx- Plavix held    Code status: Full  DVT prophylaxis: SCDs  Care Plan discussed with: Patient/Family and Nurse  Disposition: SNF- medically ready     Hospital Problems  Date Reviewed: 2021        Codes Class Noted POA    Brain mass ICD-10-CM: G93.89  ICD-9-CM: 348.89  2021 Unknown            Review of Systems:   Pertinent items are mentioned in interval history. Vital Signs:    Last 24hrs VS reviewed since prior progress note. Most recent are:  Visit Vitals  /83 (BP 1 Location: Right upper arm, BP Patient Position: At rest)   Pulse 71   Temp 97.4 °F (36.3 °C)   Resp 21   Ht 5' 4\" (1.626 m)   Wt 56.7 kg (125 lb)   SpO2 97%   BMI 21.46 kg/m²         Intake/Output Summary (Last 24 hours) at 2021 0738  Last data filed at 2021 1600  Gross per 24 hour   Intake 2831.08 ml   Output    Net 2831.08 ml        Physical Examination:   Evaluated face to face and examined 21    General:  Alert, oriented, No acute distress.  Frail, elderly [employer]  Card:  S1, S2, No murmurs, good peripheral perfusion, warm peripheries  Resp:  No accessory muscle use, Good AE, no wheezes. no crepitations  Abd:  Soft, non-tender, non-distended, BS+  Extremities:  No cyanosis or clubbing, no significant edema  Neuro:  Grossly normal, no focal neuro deficits, follows commands, speech wnl  Psych:  fair insight, not agitated. Data Review:    Review and/or order of clinical lab test  Review and/or order of tests in the radiology section of [facility]  Review and/or order of tests in the medicine section of [facility]  Labs:     Recent Labs     07/11/21  0430   WBC 6.5   HGB 13.2   HCT 38.2        Recent Labs     07/13/21  0222 07/12/21  0509 07/11/21  0430   NA  --   --  137   K  --   --  3.8   CL  --   --  106   CO2  --   --  24   BUN  --   --  28*   CREA  --   --  0.49*   GLU  --   --  135*   CA  --   --  8.5   MG 2.4 2.4 2.5*   PHOS 2.1* 2.1* 2.5*     No results for input(s): ALT, AP, TBIL, TBILI, TP, ALB, GLOB, GGT, AML, LPSE in the last 72 hours. No lab exists for component: SGOT, GPT, AMYP, HLPSE  No results for input(s): INR, PTP, APTT, INREXT, INREXT in the last 72 hours. No results for input(s): FE, TIBC, PSAT, FERR in the last 72 hours. No results found for: FOL, RBCF   No results for input(s): PH, PCO2, PO2 in the last 72 hours. No results for input(s): CPK, CKNDX, TROIQ in the last 72 hours.     No lab exists for component: CPKMB  Lab Results   Component Value Date/Time    Cholesterol, total 163 02/14/2019 12:00 AM    HDL Cholesterol 57 02/14/2019 12:00 AM    LDL, calculated 88 02/14/2019 12:00 AM    Triglyceride 92 02/14/2019 12:00 AM     No results found for: [facility]  Lab Results   Component Value Date/Time    Color YELLOW/STRAW 05/24/2021 11:30 AM    Appearance CLEAR 05/24/2021 11:30 AM    Specific gravity 1.018 05/24/2021 11:30 AM    Specific gravity 1.020 06/03/2017 06:45 PM    pH (UA) 6.0 05/24/2021 11:30 AM    Protein 100 (A) 05/24/2021 11:30 AM    Glucose Negative 05/24/2021 11:30 AM    Ketone Negative 05/24/2021 11:30 AM    Bilirubin NEGATIVE  06/03/2017 06:45 PM    Urobilinogen 0.2 05/24/2021 11:30 AM    Nitrites Negative 05/24/2021 11:30 AM    Leukocyte Esterase MODERATE (A) 05/24/2021 11:30 AM    Epithelial cells MODERATE (A) 05/24/2021 11:30 AM    Bacteria 1+ (A) 05/24/2021 11:30 AM    WBC 5-10 05/24/2021 11:30 AM    RBC 0-5 05/24/2021 11:30 AM     Medications Reviewed:     Current Facility-Administered Medications   Medication Dose Route Frequency    amLODIPine (NORVASC) tablet 10 mg  10 mg Oral DAILY    hydrALAZINE (APRESOLINE) 20 mg/mL injection 10 mg  10 mg IntraVENous Q6H PRN    balsam peru-castor oiL (VENELEX) ointment   Topical Q8H    dexAMETHasone (DECADRON) tablet 4 mg  4 mg Oral Q12H    [START ON 7/14/2021] dexAMETHasone (DECADRON) tablet 2 mg  2 mg Oral Q12H    levETIRAcetam (KEPPRA) tablet 750 mg  750 mg Oral BID    polyethylene glycol (MIRALAX) packet 17 g  17 g Oral DAILY    atenoloL (TENORMIN) tablet 25 mg  25 mg Oral DAILY    atorvastatin (LIPITOR) tablet 20 mg  20 mg Oral DAILY    pantoprazole (PROTONIX) tablet 40 mg  40 mg Oral ACB    sodium chloride (NS) flush 5-40 mL  5-40 mL IntraVENous Q8H    sodium chloride (NS) flush 5-40 mL  5-40 mL IntraVENous PRN    acetaminophen (TYLENOL) solution 650 mg  650 mg Oral Q4H PRN    HYDROcodone-acetaminophen (NORCO) 5-325 mg per tablet 1 Tablet  1 Tablet Oral Q4H PRN    morphine injection 2 mg  2 mg IntraVENous Q2H PRN    ondansetron (ZOFRAN) injection 4 mg  4 mg IntraVENous Q4H PRN    docusate sodium (COLACE) capsule 100 mg  100 mg Oral BID    lisinopriL (PRINIVIL, ZESTRIL) tablet 40 mg  40 mg Oral DAILY    metoclopramide HCl (REGLAN) tablet 5 mg  5 mg Oral BID    sodium chloride (NS) flush 5-40 mL  5-40 mL IntraVENous Q8H    sodium chloride (NS) flush 5-40 mL  5-40 mL IntraVENous PRN    acetaminophen (TYLENOL) tablet 650 mg  650 mg Oral Q6H PRN    Or    acetaminophen (TYLENOL) suppository 650 mg  650 mg Rectal Q6H PRN ______________________________________________________________________  EXPECTED LENGTH OF STAY: 3d 0h  ACTUAL LENGTH OF STAY:          Niles Gayle MD

## 2021-07-13 NOTE — PROGRESS NOTES
Provided pastoral care visit to [facility] patient. Did not include sacramental care.     Kay Orr

## 2021-07-13 NOTE — PROGRESS NOTES
Call to Pathology, spoke with Saranya Sumner. Confirmed molecular studies ordered were BRAF (5 more days pending) and C-Kit (should result today). Please notify RN if additional testing needed.

## 2021-07-13 NOTE — PROGRESS NOTES
Problem: Dysphagia (Adult)  Goal: *Acute Goals and Plan of Care (Insert Text)  Description: Speech Therapy Goals    Added 7/12/2021  1. Patient will tolerate regular diet/thin liquids with no adverse effects within 7 days. Initiated 7/8/2021  1. Patient will tolerate pureed diet/thin liquids without adverse effects within 7 days. MET 7/12/2021      Outcome: Resolved/Met     Problem: Patient Education: Go to Patient Education Activity  Goal: Patient/Family Education  Outcome: Resolved/Met     Problem: Communication Impaired (Adult)  Goal: *Acute Goals and Plan of Care (Insert Text)  Description: Speech Therapy Goals  Initiated 7/8/2021    1. Patient will participate in automatic speech tasks with 80% accuracy within 7 days. 2. Patient will participate in simple object naming tasks with 80% accuracy within 7 days. Outcome: Resolved/Met     Problem: Patient Education: Go to Patient Education Activity  Goal: Patient/Family Education  Outcome: Resolved/Met     SPEECH LANGUAGE PATHOLOGY DYSPHAGIA AND SPEECH TREATMENT WITH DISCHARGE  Patient: Marleny Tian (89 y.o. female)  Date: 7/13/2021  Diagnosis: Brain mass [G93.89] <principal problem not specified>  Procedure(s) (LRB):  LEFT FRONTAL CRANIOTOMY WITH RESECTION INTRA CRANIAL METASTASIS WITH BRAIN LAB (URGENT) (Left) 6 Days Post-Op  Precautions:  Fall    ASSESSMENT:  Patient has been tolerating regular diet/thin liquids with no adverse effects. Pt seen bedside with thin liquids and solids with no difficulties or s/s of aspiration. Patient and her caregivers report she is at baseline for her swallowing. Given diet tolerance and bedside presentation recommend continue with regular diet/thin liquids with general aspiration precautions. Skilled acute SLP services for swallowing are no longer indicated. Patient also presents with improved expressive language.  Pt able to carry on fluent and appropriate conversation with only mild occasional word finding difficulties noted. Patient and her caregivers report that this is baseline for patient and that she's always had occasional word finding difficulties. Patient presenting with functional language skills to access her environment, interact socially and express her wants and needs. Therefore skilled acute SLP services for language are no longer indicated. SLP will sign off. Please reconsult with any changes or if SLP can be of any further assistance. PLAN:  Recommendations and Planned Interventions:  -- regular diet/ thin liquids  -- general aspiration precautions including completely upright for all PO   -- SLP will sign off     Patient continues to benefit from skilled intervention to address the above impairments. Continue treatment per established plan of care. Discharge Recommendations:  None     SUBJECTIVE:   Patient stated you should get a business card to give to people, make it nice and fancy. OBJECTIVE:   Cognitive and Communication Status:  Neurologic State: Alert  Orientation Level: Oriented X4  Cognition: Appropriate for age attention/concentration, Follows commands  Perception: Appears intact  Perseveration: No perseveration noted  Safety/Judgement: Awareness of environment    Dysphagia Treatment and Interventions:  Oral Assessment:  Oral Assessment  Labial: No impairment  Dentition: Intact; Natural  Oral Hygiene: moist oral mucosa free of secretions  Lingual: No impairment  Velum: No impairment  Mandible: No impairment  P.O. Trials:  Patient Position: upright in bed  Vocal quality prior to P.O.: No impairment  Consistency Presented: Thin liquid; Solid  How Presented: Self-fed/presented     Bolus Acceptance: No impairment  Bolus Formation/Control: No impairment     Propulsion: No impairment  Oral Residue: None  Initiation of Swallow: No impairment  Laryngeal Elevation: Functional  Aspiration Signs/Symptoms: None  Pharyngeal Phase Characteristics: No impairment, issues, or problems Oral Phase Severity: No impairment  Pharyngeal Phase Severity : No impairment                     Speech Treatment and Interventions: Motor Speech:                       Speech Characteristics: Word retrieval (mild, occasional)           Language Comprehension and Expression:  Auditory Comprehension   Auditory Impairment: No   Verbal Expression  Verbal Expression  Primary Mode of Expression: Verbal  Initiation: No impairment  Automatic Speech Task: No impairment  Repetition: No impairment  Naming: No impairment  Conversation: Fluent  Speech Characteristics: Word retrieval (mild, occasional)  Effective Techniques: Word retrieval strategies  Overall Impairment: Minimal      After treatment:   Patient left in no apparent distress in bed, Call bell within reach, Nursing notified, and Caregiver / family present    COMMUNICATION/EDUCATION:     The patient's plan of care including recommendations, planned interventions, and recommended diet changes were discussed with: Registered nurse.      Thank you,   Anthony Hidalgo M.S. CF-SLP   Speech Language Pathologist     Time Calculation: 15 mins

## 2021-07-13 NOTE — PROGRESS NOTES
Transition of Care Plan   RUR- 16% Low Risk   DISPOSITION: The disposition plan is to transition to a SNF - [facility]/[phone] - patient accepted. [address] started today 7/13/21.  DON of facility is requesting updated PT/OT notes to submit to insurance authorization.  F/U with PCP/Specialist     Transport: Family - Son - Stuart Mishra - [phone]    At 9:53am - CAITLIN attempted to contact Deja Sam MAX - [phone] to follow up regarding patient. (receiving clinical records from yesterday and today). She was not available at this time, CM left a voice message. At 11:15am - CAITLIN met with patient and patients son at bedside. Patients son reports that he will transport patient to facility once the insurance authorization is completed. CAITLIN contacted Dimitris SANTANA at facility who reports that she did receive patients clinical records and it is currently being review. She reports that she will follow up with TW and CM will provide [facility] [phone] with that update. At 11:34am - CAITLIN received a call from Dimitris Mcbride who reports that patients insurance is requiring up to 72 hours to complete the medication review. Dimitris Mcbride reports she will inform son. At 3:21pm - CAITLIN received a call from Dimitris Mcbride who is requesting most updated PT/OT and SLP notes. CM submitted as requested, which is needed for insurance authorization. CM will continue to follow, provide support and assist with JULIOCESAR needs as they arise.     Jason Marks

## 2021-07-14 LAB
ANION GAP SERPL CALC-SCNC: 6 MMOL/L (ref 5–15)
BUN SERPL-MCNC: 36 MG/DL (ref 6–20)
BUN/CREAT SERPL: 64 (ref 12–20)
CALCIUM SERPL-MCNC: 8.6 MG/DL (ref 8.5–10.1)
CHLORIDE SERPL-SCNC: 103 MMOL/L (ref 97–108)
CO2 SERPL-SCNC: 26 MMOL/L (ref 21–32)
COMMENT, HOLDF: NORMAL
CREAT SERPL-MCNC: 0.56 MG/DL (ref 0.55–1.02)
ERYTHROCYTE [DISTWIDTH] IN BLOOD BY AUTOMATED COUNT: 14.3 % (ref 11.5–14.5)
GLUCOSE SERPL-MCNC: 125 MG/DL (ref 65–100)
HCT VFR BLD AUTO: 38.2 % (ref 35–47)
HGB BLD-MCNC: 12.8 G/DL (ref 11.5–16)
MCH RBC QN AUTO: 29.6 PG (ref 26–34)
MCHC RBC AUTO-ENTMCNC: 33.5 G/DL (ref 30–36.5)
MCV RBC AUTO: 88.2 FL (ref 80–99)
NRBC # BLD: 0 K/UL (ref 0–0.01)
NRBC BLD-RTO: 0 PER 100 WBC
PLATELET # BLD AUTO: 141 K/UL (ref 150–400)
PMV BLD AUTO: 10.1 FL (ref 8.9–12.9)
POTASSIUM SERPL-SCNC: 4.3 MMOL/L (ref 3.5–5.1)
RBC # BLD AUTO: 4.33 M/UL (ref 3.8–5.2)
SAMPLES BEING HELD,HOLD: NORMAL
SODIUM SERPL-SCNC: 135 MMOL/L (ref 136–145)
WBC # BLD AUTO: 11.8 K/UL (ref 3.6–11)

## 2021-07-14 PROCEDURE — 74011250637 HC RX REV CODE- 250/637: Performed by: SPECIALIST

## 2021-07-14 PROCEDURE — 97530 THERAPEUTIC ACTIVITIES: CPT

## 2021-07-14 PROCEDURE — 80048 BASIC METABOLIC PNL TOTAL CA: CPT

## 2021-07-14 PROCEDURE — 65660000000 HC RM CCU STEPDOWN

## 2021-07-14 PROCEDURE — 74011250637 HC RX REV CODE- 250/637: Performed by: NURSE PRACTITIONER

## 2021-07-14 PROCEDURE — 74011250636 HC RX REV CODE- 250/636: Performed by: NURSE PRACTITIONER

## 2021-07-14 PROCEDURE — 85027 COMPLETE CBC AUTOMATED: CPT

## 2021-07-14 PROCEDURE — 74011250637 HC RX REV CODE- 250/637: Performed by: INTERNAL MEDICINE

## 2021-07-14 PROCEDURE — 74011250636 HC RX REV CODE- 250/636: Performed by: INTERNAL MEDICINE

## 2021-07-14 PROCEDURE — 36415 COLL VENOUS BLD VENIPUNCTURE: CPT

## 2021-07-14 RX ORDER — LORAZEPAM 2 MG/ML
0.5 INJECTION INTRAMUSCULAR
Status: DISCONTINUED | OUTPATIENT
Start: 2021-07-14 | End: 2021-07-16 | Stop reason: HOSPADM

## 2021-07-14 RX ORDER — LEVETIRACETAM 500 MG/1
500 TABLET ORAL 2 TIMES DAILY
Status: DISCONTINUED | OUTPATIENT
Start: 2021-07-14 | End: 2021-07-16 | Stop reason: HOSPADM

## 2021-07-14 RX ADMIN — LISINOPRIL 40 MG: 20 TABLET ORAL at 10:35

## 2021-07-14 RX ADMIN — DEXAMETHASONE 2 MG: 1 TABLET ORAL at 21:20

## 2021-07-14 RX ADMIN — LORAZEPAM 0.5 MG: 2 INJECTION INTRAMUSCULAR; INTRAVENOUS at 21:20

## 2021-07-14 RX ADMIN — AMLODIPINE BESYLATE 10 MG: 5 TABLET ORAL at 10:35

## 2021-07-14 RX ADMIN — DOCUSATE SODIUM 100 MG: 100 CAPSULE, LIQUID FILLED ORAL at 10:35

## 2021-07-14 RX ADMIN — Medication: at 06:42

## 2021-07-14 RX ADMIN — PANTOPRAZOLE SODIUM 40 MG: 40 TABLET, DELAYED RELEASE ORAL at 06:41

## 2021-07-14 RX ADMIN — ATORVASTATIN CALCIUM 20 MG: 20 TABLET, FILM COATED ORAL at 10:35

## 2021-07-14 RX ADMIN — Medication 10 ML: at 06:42

## 2021-07-14 RX ADMIN — DOCUSATE SODIUM 100 MG: 100 CAPSULE, LIQUID FILLED ORAL at 18:27

## 2021-07-14 RX ADMIN — Medication: at 21:26

## 2021-07-14 RX ADMIN — ATENOLOL 25 MG: 25 TABLET ORAL at 10:36

## 2021-07-14 RX ADMIN — POLYETHYLENE GLYCOL 3350 17 G: 17 POWDER, FOR SOLUTION ORAL at 10:38

## 2021-07-14 RX ADMIN — METOCLOPRAMIDE 5 MG: 10 TABLET ORAL at 10:35

## 2021-07-14 RX ADMIN — DEXAMETHASONE 2 MG: 1 TABLET ORAL at 10:35

## 2021-07-14 RX ADMIN — LEVETIRACETAM 750 MG: 500 TABLET ORAL at 10:35

## 2021-07-14 RX ADMIN — METOCLOPRAMIDE 5 MG: 10 TABLET ORAL at 18:27

## 2021-07-14 RX ADMIN — LEVETIRACETAM 500 MG: 500 TABLET ORAL at 18:27

## 2021-07-14 RX ADMIN — Medication 10 ML: at 21:21

## 2021-07-14 NOTE — PROGRESS NOTES
Problem: Self Care Deficits Care Plan (Adult)  Goal: *Acute Goals and Plan of Care (Insert Text)  Description:   FUNCTIONAL STATUS PRIOR TO ADMISSION: Patient was modified independent using a walker for functional mobility. Per chart, patient lives in [address] [place] [address] and was able to complete ADLs. HOME SUPPORT: The patient lived alone with family to provide assistance. Occupational Therapy Goals  Initiated 7/8/2021  1. Patient will perform 2 simple grooming tasks sitting unsupported with moderate assistance within 7 day(s). 2.  Patient will perform anterior neck to thigh bathing sitting unsupported with moderate assistance within 7 day(s). 3.  Patient will perform toilet transfers with moderate assistance within 7 day(s). 4.  Patient will perform all aspects of toileting with moderate assistance within 7 day(s). 5.  Patient will participate in upper extremity therapeutic exercise/activities with minimal assistance/contact guard assist for 5 minutes within 7 day(s). Outcome: Progressing Towards Goal     OCCUPATIONAL THERAPY TREATMENT  Patient: Sarina Bermudez (89 y.o. female)  Date: 7/14/2021  Diagnosis: Brain mass [G93.89] <principal problem not specified>  Procedure(s) (LRB):  LEFT FRONTAL CRANIOTOMY WITH RESECTION INTRA CRANIAL METASTASIS WITH BRAIN LAB (URGENT) (Left) 7 Days Post-Op  Precautions: Fall  Chart, occupational therapy assessment, plan of care, and goals were reviewed. ASSESSMENT  Patient continues with skilled OT services and is progressing towards goals however remains limited by impaired balance, cognition (attention, sequencing, judgment, processing, memory, insight, and impulsivity), coordination, strength, activity tolerance, safety awareness, and functional reach with OOB mobility and feeding tasks completed this session. Increased fatigue and drowsiness this session requiring mod-max cues and increased time for bed mobility with Min-Mod A x2.  She is progressing in her standing balance, continues with hip flexion but able to correct & initiate briefly with cueing. Decreased FM control and strength noted with feeding tasks requiring up to [address]. Continue to recommend d/c to SNF as she remains far from her Mod I baseline, not safe to return home alone at this time. Current Level of Function Impacting Discharge (ADLs): Min-Total A for ADLs, Min-Mod A x1-2 for OOB mobility with rodriguez HHA     Other factors to consider for discharge: fall risk, lives alone, oncology treatment          PLAN :  Patient continues to benefit from skilled intervention to address the above impairments. Continue treatment per established plan of care to address goals. Recommend with staff: Recommend with nursing, ADLs with assist, OOB to chair 3x/day with bilateral handheld assist and toileting via bedside commode with 2 assist. Thank you for completing as able in order to maintain patient strength, endurance and independence. Recommendation for discharge: (in order for the patient to meet his/her long term goals)  Therapy up to 5 days/week in SNF setting    This discharge recommendation:  Has been made in collaboration with the attending provider and/or case management    IF patient discharges home will need the following DME: To be determined (TBD)         SUBJECTIVE:   Patient stated I'm so tired. Srinivas Almonte. I need to moan. I'm just sighing out of inconvenience.     OBJECTIVE DATA SUMMARY:   Cognitive/Behavioral Status:  Neurologic State: Alert  Orientation Level: Oriented to person;Oriented to place;Oriented to situation;Disoriented to time (intermittent confusion)  Cognition: Decreased attention/concentration; Follows commands; Impaired decision making; Impulsive;Poor safety awareness  Perception: Appears intact  Perseveration: Perseverates during conversation  Safety/Judgement: Decreased awareness of environment;Decreased awareness of need for assistance;Decreased awareness of need for safety;Decreased insight into deficits    Functional Mobility and Transfers for ADLs:  Bed Mobility:  Supine to Sit: Minimum assistance; Moderate assistance;Assist x2  Scooting: Minimum assistance (max VCs)    Transfers:  Sit to Stand: Minimum assistance;Assist x2     Bed to Chair: Minimum assistance;Assist x2    Balance:  Sitting: Impaired; Without support  Sitting - Static: Fair (occasional)  Sitting - Dynamic: Poor (constant support)  Standing: Impaired; With support  Standing - Static: Poor;Constant support  Standing - Dynamic : Poor;Constant support    ADL Intervention:  Feeding  Feeding Assistance: Minimum assistance  Container Management: Minimum assistance (decreased FM coordination & LUE initiation for bimanual task)  Food to Mouth: Set-up  Drink to Mouth: Set-up  Cues: Physical assistance; Tactile cues provided;Verbal cues provided;Visual cues provided    Toileting  Toileting Assistance: Total assistance(dependent) (brief & farideh)    Cognitive Retraining  Orientation Retraining: Awareness of environment  Problem Solving: Awareness of environment  Executive Functions: Executing cognitive plans;Regulating behavior  Organizing/Sequencing: Breaking task down  Attention to Task: Single task  Following Commands: Follows one step commands/directions  Safety/Judgement: Decreased awareness of environment;Decreased awareness of need for assistance;Decreased awareness of need for safety;Decreased insight into deficits  Cues: Tactile cues provided;Verbal cues provided;Visual cues provided    Pain:  None reportde    Activity Tolerance:   Fair    After treatment patient left in no apparent distress:   Sitting in chair, Call bell within reach, and Bed / chair alarm activated    COMMUNICATION/COLLABORATION:   The patients plan of care was discussed with: Physical therapist and Registered nurse.      AGUILAR Escamilla, OTR/L  Time Calculation: 19 mins

## 2021-07-14 NOTE — PROGRESS NOTES
Neurosurgery Progress Note  Jairo Kemp ACNP-BC          Admit Date: 2021   LOS: 12 days        Daily Progress Note: 2021    POD: 6 Day Post-Op    S/P: Procedure(s):  LEFT FRONTAL CRANIOTOMY WITH RESECTION INTRA CRANIAL METASTASIS WITH BRAIN LAB (URGENT)    HPI: The patient presented to Dr. Terra Horne oncology at [facility] after a brain MRI showed multiple brain mets of unknown origin. She had nausea and vomiting for a month and lost about 20 pounds. She was referred to Dr. Preeti Forbes after being started on steroids, but then was referred to the ER for work-up and evaluation. The patient's CT scans of the chest/abdomen/pelvis were unrevealing for other sites of disease. Due to the family's desire to be aggressive, after discussing risks, benefits, and alternatives of surgery, they decided to proceed with resection of the left frontal tumor for diagnosis. She underwent a left frontal craniotomy with Dr. Lexie Torres yesterday. Prelim path consistent with melanoma. She presents in the ICU with non-fluent expressive/receptive aphasia and some right sided weakness this morning. She does answer yes/no questions appropriately. Subjective:   Awaiting insurance authorization for SNF. Son helping her eat lunch. Denies HA, nausea, vomiting. Objective:     Vital signs  Temp (24hrs), Av.9 °F (36.6 °C), Min:97.5 °F (36.4 °C), Max:98.4 °F (36.9 °C)   No intake/output data recorded.  1901 -  0700  In: 480 [P.O.:480]  Out: 2675 [Urine:2675]    Visit Vitals  /69   Pulse 91   Temp 98 °F (36.7 °C)   Resp 25   Ht 5' 4\" (1.626 m)   Wt 58.2 kg (128 lb 6.4 oz)   SpO2 94%   BMI 22.04 kg/m²    O2 Flow Rate (L/min): 2 l/min O2 Device: None (Room air)     Pain control  Pain Assessment  Pain Scale 1: Numeric (0 - 10)  Pain Intensity 1: 0  Pain Intervention(s) 1: Rest    PT/OT  Gait                 Physical Exam:  Gen:NAD. Neuro: Awake. Ox3. Follows commands. Speech mild non-fluent expressive/receptive aphasia. Affect flat  PERRL. EOMI. Face symmetric. Tongue midline. BARCENAS spontaneously L>R. Gait deferred. Skin: Scalp incision C/D/I with sutures in place. CT head without contrast on 07/08/21 shows interval resection superior left frontal lobe lesion with small amount of pneumocephalus and hemorrhage at the resection site. No mass effect/edema. No  midline shift. Chronic white matter disease, other hyperdense metastases, and ventriculomegaly all unchanged. 24 hour results:    Recent Results (from the past 24 hour(s))   METABOLIC PANEL, BASIC    Collection Time: 07/14/21  2:40 AM   Result Value Ref Range    Sodium 135 (L) 136 - 145 mmol/L    Potassium 4.3 3.5 - 5.1 mmol/L    Chloride 103 97 - 108 mmol/L    CO2 26 21 - 32 mmol/L    Anion gap 6 5 - 15 mmol/L    Glucose 125 (H) 65 - 100 mg/dL    BUN 36 (H) 6 - 20 MG/DL    Creatinine 0.56 0.55 - 1.02 MG/DL    BUN/Creatinine ratio 64 (H) 12 - 20      GFR est AA >60 >60 ml/min/1.73m2    GFR est non-AA >60 >60 ml/min/1.73m2    Calcium 8.6 8.5 - 10.1 MG/DL   CBC W/O DIFF    Collection Time: 07/14/21  2:40 AM   Result Value Ref Range    WBC 11.8 (H) 3.6 - 11.0 K/uL    RBC 4.33 3.80 - 5.20 M/uL    HGB 12.8 11.5 - 16.0 g/dL    HCT 38.2 35.0 - 47.0 %    MCV 88.2 80.0 - 99.0 FL    MCH 29.6 26.0 - 34.0 PG    MCHC 33.5 30.0 - 36.5 g/dL    RDW 14.3 11.5 - 14.5 %    PLATELET 999 (L) 060 - 400 K/uL    MPV 10.1 8.9 - 12.9 FL    NRBC 0.0 0  WBC    ABSOLUTE NRBC 0.00 0.00 - 0.01 K/uL   SAMPLES BEING HELD    Collection Time: 07/14/21  2:40 AM   Result Value Ref Range    SAMPLES BEING HELD 1SST,1RED     COMMENT        Add-on orders for these samples will be processed based on acceptable specimen integrity and analyte stability, which may vary by analyte. Assessment:     Active Problems:    Brain mass (7/2/2021)        Plan:   1. Brain metastases   - s/p crani 07/07. Path consistent with melanoma   - cont decadron - [facility]. D/c to home on Keppra 500 mg bid   - PT/OT/Speech   - normalize and mobilize   - ok to go to SNF when accepted from NSGY standpoint   - will need adjuvant radiosurgery to other lesions. Rad onc to follow-up with patient. 2. Brain compression with cerebral edema   - due to #1   - plans as above  3. Expressive/receptive aphasia   - due to #1, 2   - speech therapy  4. HTN   - cont atenolol, lisinopril   - SBP<140   - hydralazine/labetalol PRN  5. GERD   - cont PPI    Activity: up with assist  DVT ppx: SCDs  Dispo: SNF    Plan d/w Dr. Joselyn Goodrich, son Enrique Sender at bedside. Sutures to be removed on 07/21/21. This can be done at the facility.       Yesenia Guerrier NP

## 2021-07-14 NOTE — PROGRESS NOTES
Transition of Care Plan  · RUR- 16% Low Risk  · DISPOSITION: The disposition plan is to transition to a SNF - [facility]/[phone] - patient accepted. · Insurance Authorization started on 7/13/21. · DON of facility is requesting updated PT/OT notes to submit to insurance authorization. · F/U with PCP/Specialist    · Transport: Family - Sebastian Whalen - [phone]    Insurance Authorization pending. Dolly Capone (director of nursing) at facility will provide TW with an update regarding the completion of the insurance authorization. Patients sebastian Schulte will provide transportation to facility. Patient is medically stable to transition to facility. At 3:36pm - CM contacted Dolly SANTANA with the [address] who reports, that she has not heard back regarding the insurance authorization. CM informed patient and patients son at bedside. CM will continue to follow, provide support and assist with JULIOCESAR needs as they arise.     Ascencion Peoples

## 2021-07-14 NOTE — PROGRESS NOTES
Bedside and Verbal shift change report given to [facility], RN (oncoming nurse) by Aashish Fallon RN (offgoing nurse). Report included the following information SBAR, Kardex, Intake/Output, MAR, Recent Results, Cardiac Rhythm NSR and Quality Measures.

## 2021-07-14 NOTE — PROGRESS NOTES
Hospitalist Progress Note  Casandra Caputo MD  Answering service: [phone] from in house phone      Date of Service:  2021  NAME:  Lien Schneider  :  1932  MRN:  [id number]    Admission Summary:   89F p/w mets to brain  Interval history / Subjective:   Patient seen and examined at bedside, feels ok, no acute events, showing early dehydration signs on labs with high urea/cr ratio. Encouraged drinking fluids. A/w SNF placement. Assessment & Plan:     1. 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 PT/OT. 2. HTN: - Home lisinopril, atenolol, amlodipine dose increased- Goal SBP <140   3. HLD- Continue atorvastatin   4. CVA hx- Plavix held    Code status: Full  DVT prophylaxis: SCDs  Care Plan discussed with: Patient/Family and Nurse  Disposition: SNF- medically ready     Hospital Problems  Date Reviewed: 2021        Codes Class Noted POA    Brain mass ICD-10-CM: G93.89  ICD-9-CM: 348.89  2021 Unknown            Review of Systems:   Pertinent items are mentioned in interval history. Vital Signs:    Last 24hrs VS reviewed since prior progress note. Most recent are:  Visit Vitals  /75   Pulse 76   Temp 98.1 °F (36.7 °C)   Resp 21   Ht 5' 4\" (1.626 m)   Wt 58.2 kg (128 lb 6.4 oz)   SpO2 96%   BMI 22.04 kg/m²         Intake/Output Summary (Last 24 hours) at 2021 0719  Last data filed at 2021 0400  Gross per 24 hour   Intake 480 ml   Output 2675 ml   Net -2195 ml        Physical Examination:   Evaluated face to face and examined 21    General:  Alert, oriented, No acute distress. Frail, elderly [employer]  Resp:  No accessory muscle use, fair AE, no wheezes.  no crepitations  Abd:  Soft, non-tender, non-distended, BS+  Extremities:  No cyanosis or clubbing, no significant edema  Neuro:  Grossly normal, no focal neuro deficits, follows commands, speech wnl- some word finding difficulties  Psych:  fair insight, not agitated. Data Review:    Review and/or order of clinical lab test  Review and/or order of tests in the radiology section of [facility]  Review and/or order of tests in the medicine section of [facility]  Labs:     Recent Labs     07/14/21  0240   WBC 11.8*   HGB 12.8   HCT 38.2   *     Recent Labs     07/14/21  0240 07/13/21  0222 07/12/21  0509   *  --   --    K 4.3  --   --      --   --    CO2 26  --   --    BUN 36*  --   --    CREA 0.56  --   --    *  --   --    CA 8.6  --   --    MG  --  2.4 2.4   PHOS  --  2.1* 2.1*     No results for input(s): ALT, AP, TBIL, TBILI, TP, ALB, GLOB, GGT, AML, LPSE in the last 72 hours. No lab exists for component: SGOT, GPT, AMYP, HLPSE  No results for input(s): INR, PTP, APTT, INREXT, INREXT in the last 72 hours. No results for input(s): FE, TIBC, PSAT, FERR in the last 72 hours. No results found for: FOL, RBCF   No results for input(s): PH, PCO2, PO2 in the last 72 hours. No results for input(s): CPK, CKNDX, TROIQ in the last 72 hours.     No lab exists for component: CPKMB  Lab Results   Component Value Date/Time    Cholesterol, total 163 02/14/2019 12:00 AM    HDL Cholesterol 57 02/14/2019 12:00 AM    LDL, calculated 88 02/14/2019 12:00 AM    Triglyceride 92 02/14/2019 12:00 AM     No results found for: [facility]  Lab Results   Component Value Date/Time    Color YELLOW/STRAW 05/24/2021 11:30 AM    Appearance CLEAR 05/24/2021 11:30 AM    Specific gravity 1.018 05/24/2021 11:30 AM    Specific gravity 1.020 06/03/2017 06:45 PM    pH (UA) 6.0 05/24/2021 11:30 AM    Protein 100 (A) 05/24/2021 11:30 AM    Glucose Negative 05/24/2021 11:30 AM    Ketone Negative 05/24/2021 11:30 AM    Bilirubin NEGATIVE  06/03/2017 06:45 PM Urobilinogen 0.2 05/24/2021 11:30 AM    Nitrites Negative 05/24/2021 11:30 AM    Leukocyte Esterase MODERATE (A) 05/24/2021 11:30 AM    Epithelial cells MODERATE (A) 05/24/2021 11:30 AM    Bacteria 1+ (A) 05/24/2021 11:30 AM    WBC 5-10 05/24/2021 11:30 AM    RBC 0-5 05/24/2021 11:30 AM     Medications Reviewed:     Current Facility-Administered Medications   Medication Dose Route Frequency    amLODIPine (NORVASC) tablet 10 mg  10 mg Oral DAILY    hydrALAZINE (APRESOLINE) 20 mg/mL injection 10 mg  10 mg IntraVENous Q6H PRN    balsam peru-castor oiL (VENELEX) ointment   Topical Q8H    dexAMETHasone (DECADRON) tablet 2 mg  2 mg Oral Q12H    levETIRAcetam (KEPPRA) tablet 750 mg  750 mg Oral BID    polyethylene glycol (MIRALAX) packet 17 g  17 g Oral DAILY    atenoloL (TENORMIN) tablet 25 mg  25 mg Oral DAILY    atorvastatin (LIPITOR) tablet 20 mg  20 mg Oral DAILY    pantoprazole (PROTONIX) tablet 40 mg  40 mg Oral ACB    sodium chloride (NS) flush 5-40 mL  5-40 mL IntraVENous Q8H    sodium chloride (NS) flush 5-40 mL  5-40 mL IntraVENous PRN    acetaminophen (TYLENOL) solution 650 mg  650 mg Oral Q4H PRN    HYDROcodone-acetaminophen (NORCO) 5-325 mg per tablet 1 Tablet  1 Tablet Oral Q4H PRN    morphine injection 2 mg  2 mg IntraVENous Q2H PRN    ondansetron (ZOFRAN) injection 4 mg  4 mg IntraVENous Q4H PRN    docusate sodium (COLACE) capsule 100 mg  100 mg Oral BID    lisinopriL (PRINIVIL, ZESTRIL) tablet 40 mg  40 mg Oral DAILY    metoclopramide HCl (REGLAN) tablet 5 mg  5 mg Oral BID    sodium chloride (NS) flush 5-40 mL  5-40 mL IntraVENous Q8H    sodium chloride (NS) flush 5-40 mL  5-40 mL IntraVENous PRN    acetaminophen (TYLENOL) tablet 650 mg  650 mg Oral Q6H PRN    Or    acetaminophen (TYLENOL) suppository 650 mg  650 mg Rectal Q6H PRN   ______________________________________________________________________  EXPECTED LENGTH OF STAY: 3d 0h  ACTUAL LENGTH OF STAY:          12 Casandra Caputo MD

## 2021-07-14 NOTE — PROGRESS NOTES
Problem: Falls - Risk of  Goal: *Absence of Falls  Description: Document Christina Barron Fall Risk and appropriate interventions in the flowsheet.   Outcome: Progressing Towards Goal  Note: Fall Risk Interventions:  Mobility Interventions: Communicate number of staff needed for ambulation/transfer, Bed/chair exit alarm, PT Consult for mobility concerns, PT Consult for assist device competence, Strengthening exercises (ROM-active/passive)    Mentation Interventions: Bed/chair exit alarm, Adequate sleep, hydration, pain control, More frequent rounding, Reorient patient, Toileting rounds    Medication Interventions: Patient to call before getting OOB, Teach patient to arise slowly    Elimination Interventions: Call light in reach, Bed/chair exit alarm, Toileting schedule/hourly rounds, Toilet paper/wipes in reach    History of Falls Interventions: Door open when patient unattended, Consult care management for discharge planning, Bed/chair exit alarm         Problem: Brain Tumor Day 3 to Discharge  Goal: Nutrition/Diet  Outcome: Progressing Towards Goal  Goal: Medications  Outcome: Progressing Towards Goal  Goal: Treatments/Interventions/Procedures  Outcome: Progressing Towards Goal  Goal: Progressive Mobility and Function  Outcome: Progressing Towards Goal     Problem: Brain Tumor Discharge Outcomes  Goal: *Adequate oxygenation  Outcome: Progressing Towards Goal  Goal: *Tolerates nutrition therapy  Outcome: Progressing Towards Goal  Goal: *Verbalizes understanding and describes medication purposes and frequencies  Outcome: Progressing Towards Goal  Goal: *Verbalizes understanding of type and use of pain medication  Outcome: Progressing Towards Goal

## 2021-07-14 NOTE — PROGRESS NOTES
Problem: Falls - Risk of  Goal: *Absence of Falls  Description: Document Gwyn Pandya Fall Risk and appropriate interventions in the flowsheet.   Outcome: Progressing Towards Goal  Note: Fall Risk Interventions:  Mobility Interventions: Communicate number of staff needed for ambulation/transfer, PT Consult for mobility concerns, PT Consult for assist device competence, Utilize walker, cane, or other assistive device    Mentation Interventions: Door open when patient unattended, Family/sitter at bedside, More frequent rounding, Increase mobility, Update white board    Medication Interventions: Teach patient to arise slowly, Patient to call before getting OOB, Evaluate medications/consider consulting pharmacy    Elimination Interventions: Call light in reach, Toileting schedule/hourly rounds, Patient to call for help with toileting needs    History of Falls Interventions: Door open when patient unattended, Investigate reason for fall         Problem: Patient Education: Go to Patient Education Activity  Goal: Patient/Family Education  Outcome: Progressing Towards Goal     Problem: Brain Tumor Discharge Outcomes  Goal: *Adequate oxygenation  Outcome: Progressing Towards Goal  Goal: *Verbalizes understanding and describes medication purposes and frequencies  Outcome: Progressing Towards Goal  Goal: *Verbalizes understanding of type and use of pain medication  Outcome: Progressing Towards Goal  Goal: *Describes available resources and support systems  Outcome: Progressing Towards Goal     Problem: Breathing Pattern - Ineffective  Goal: *Absence of hypoxia  Outcome: Progressing Towards Goal  Goal: *Use of effective breathing techniques  Outcome: Progressing Towards Goal

## 2021-07-15 ENCOUNTER — APPOINTMENT (OUTPATIENT)
Dept: INFUSION THERAPY | Age: 86
End: 2021-07-15

## 2021-07-15 ENCOUNTER — TELEPHONE (OUTPATIENT)
Dept: ONCOLOGY | Age: 86
End: 2021-07-15

## 2021-07-15 PROCEDURE — 65660000000 HC RM CCU STEPDOWN

## 2021-07-15 PROCEDURE — 74011250637 HC RX REV CODE- 250/637: Performed by: SPECIALIST

## 2021-07-15 PROCEDURE — 74011250636 HC RX REV CODE- 250/636: Performed by: NURSE PRACTITIONER

## 2021-07-15 PROCEDURE — 74011250637 HC RX REV CODE- 250/637: Performed by: INTERNAL MEDICINE

## 2021-07-15 PROCEDURE — 74011250636 HC RX REV CODE- 250/636: Performed by: HOSPITALIST

## 2021-07-15 PROCEDURE — 74011250637 HC RX REV CODE- 250/637: Performed by: NURSE PRACTITIONER

## 2021-07-15 PROCEDURE — 74011250636 HC RX REV CODE- 250/636: Performed by: INTERNAL MEDICINE

## 2021-07-15 RX ORDER — SODIUM CHLORIDE 9 MG/ML
75 INJECTION, SOLUTION INTRAVENOUS CONTINUOUS
Status: DISCONTINUED | OUTPATIENT
Start: 2021-07-15 | End: 2021-07-16 | Stop reason: HOSPADM

## 2021-07-15 RX ADMIN — DEXAMETHASONE 2 MG: 1 TABLET ORAL at 21:40

## 2021-07-15 RX ADMIN — SODIUM CHLORIDE 250 ML: 9 INJECTION, SOLUTION INTRAVENOUS at 12:43

## 2021-07-15 RX ADMIN — DOCUSATE SODIUM 100 MG: 100 CAPSULE, LIQUID FILLED ORAL at 18:12

## 2021-07-15 RX ADMIN — LEVETIRACETAM 500 MG: 500 TABLET ORAL at 10:21

## 2021-07-15 RX ADMIN — DEXAMETHASONE 2 MG: 1 TABLET ORAL at 10:22

## 2021-07-15 RX ADMIN — LORAZEPAM 0.5 MG: 2 INJECTION INTRAMUSCULAR; INTRAVENOUS at 21:40

## 2021-07-15 RX ADMIN — ATORVASTATIN CALCIUM 20 MG: 20 TABLET, FILM COATED ORAL at 10:22

## 2021-07-15 RX ADMIN — METOCLOPRAMIDE 5 MG: 10 TABLET ORAL at 10:21

## 2021-07-15 RX ADMIN — PANTOPRAZOLE SODIUM 40 MG: 40 TABLET, DELAYED RELEASE ORAL at 06:34

## 2021-07-15 RX ADMIN — Medication: at 06:34

## 2021-07-15 RX ADMIN — LEVETIRACETAM 500 MG: 500 TABLET ORAL at 18:13

## 2021-07-15 RX ADMIN — SODIUM CHLORIDE 75 ML/HR: 9 INJECTION, SOLUTION INTRAVENOUS at 14:11

## 2021-07-15 RX ADMIN — Medication 10 ML: at 06:34

## 2021-07-15 RX ADMIN — Medication: at 21:40

## 2021-07-15 RX ADMIN — Medication: at 14:09

## 2021-07-15 RX ADMIN — METOCLOPRAMIDE 5 MG: 10 TABLET ORAL at 18:12

## 2021-07-15 RX ADMIN — DOCUSATE SODIUM 100 MG: 100 CAPSULE, LIQUID FILLED ORAL at 10:20

## 2021-07-15 RX ADMIN — POLYETHYLENE GLYCOL 3350 17 G: 17 POWDER, FOR SOLUTION ORAL at 09:00

## 2021-07-15 NOTE — TELEPHONE ENCOUNTER
Patient son called would like to discuss patient apt for Bernie Batch that is scheduled  on 07/16/2021.

## 2021-07-15 NOTE — PROGRESS NOTES
Provided pastoral care visit to [facility] 5 patient. Did not include sacramental care.     [place] Fallon

## 2021-07-15 NOTE — PROGRESS NOTES
Problem: Falls - Risk of  Goal: *Absence of Falls  Description: Document Brissa Renate Fall Risk and appropriate interventions in the flowsheet. Outcome: Progressing Towards Goal  Note: Fall Risk Interventions:  Mobility Interventions: Communicate number of staff needed for ambulation/transfer, Bed/chair exit alarm, Patient to call before getting OOB, Utilize walker, cane, or other assistive device    Mentation Interventions: Bed/chair exit alarm, Door open when patient unattended, Increase mobility, More frequent rounding, Reorient patient    Medication Interventions: Teach patient to arise slowly, Patient to call before getting OOB, Bed/chair exit alarm    Elimination Interventions: Stay With Me (per policy), Toilet paper/wipes in reach, Toileting schedule/hourly rounds, Call light in reach, Bed/chair exit alarm    History of Falls Interventions: Bed/chair exit alarm, Investigate reason for fall         Problem: Brain Tumor Discharge Outcomes  Goal: *Neurologic stability  Outcome: Progressing Towards Goal  Goal: *Adequate oxygenation  Outcome: Progressing Towards Goal  Goal: *Tolerates nutrition therapy  Outcome: Progressing Towards Goal  Goal: *Verbalizes understanding of type and use of pain medication  Outcome: Progressing Towards Goal     Problem: Pressure Injury - Risk of  Goal: *Prevention of pressure injury  Description: Document Arpan Scale and appropriate interventions in the flowsheet.   Outcome: Progressing Towards Goal  Note: Pressure Injury Interventions:  Sensory Interventions: Assess changes in LOC, Keep linens dry and wrinkle-free, Discuss PT/OT consult with provider    Moisture Interventions: Absorbent underpads, Apply protective barrier, creams and emollients, Internal/External urinary devices    Activity Interventions: Increase time out of bed, Pressure redistribution bed/mattress(bed type), PT/OT evaluation    Mobility Interventions: Pressure redistribution bed/mattress (bed type), HOB 30 degrees or less, Float heels, PT/OT evaluation    Nutrition Interventions: Document food/fluid/supplement intake    Friction and Shear Interventions: Apply protective barrier, creams and emollients, HOB 30 degrees or less, Minimize layers

## 2021-07-15 NOTE — PROGRESS NOTES
Hospitalist Progress Note  Amrik Morales MD  Answering service: [phone] OR [phone] from in house phone      Date of Service:  7/15/2021  NAME:  Robert Amaral  :  1932  MRN:  [id number]    Admission Summary:   89F p/w mets to brain    Interval history / Subjective:   Patient seen and examined at bedside, feels ok, no acute events,  . Encouraged drinking fluids. A/w SNF placement. auth pending      Assessment & Plan:     1. 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 PT/OT. 2. HTN: - Home lisinopril, atenolol, amlodipine dose increased- Goal SBP <140   3. HLD- Continue atorvastatin   4. CVA hx- Plavix held    Code status: Full  DVT prophylaxis: SCDs  Care Plan discussed with: Patient/Family and Nurse  Disposition: SNF- medically ready awaiting [facility] Problems  Date Reviewed: 2021        Codes Class Noted POA    Brain mass ICD-10-CM: G93.89  ICD-9-CM: 348.89  2021 Unknown            Review of Systems:   Pertinent items are mentioned in interval history. Vital Signs:    Last 24hrs VS reviewed since prior progress note. Most recent are:  Visit Vitals  BP 94/69 (BP 1 Location: Right arm, BP Patient Position: At rest)   Pulse 93   Temp 98.1 °F (36.7 °C)   Resp 24   Ht 5' 4\" (1.626 m)   Wt 57.6 kg (126 lb 14.4 oz)   SpO2 90%   BMI 21.78 kg/m²         Intake/Output Summary (Last 24 hours) at 7/15/2021 1102  Last data filed at 7/15/2021 0400  Gross per 24 hour   Intake    Output 750 ml   Net -750 ml        Physical Examination:   Evaluated face to face and examined 07/15/21    General:  Alert, oriented, No acute distress. Frail, elderly [employer]  Resp:  No accessory muscle use, fair AE, no wheezes.  no crepitations  Abd:  Soft, non-tender, non-distended, BS+  Extremities:  No cyanosis or clubbing, no significant edema  Neuro:  Grossly normal, no focal neuro deficits, follows commands, speech wnl- some word finding difficulties  Psych:  fair insight, not agitated. Data Review:    Review and/or order of clinical lab test  Review and/or order of tests in the radiology section of CPT  Review and/or order of tests in the medicine section of [facility]  Labs:     Recent Labs     07/14/21 0240   WBC 11.8*   HGB 12.8   HCT 38.2   *     Recent Labs     07/14/21 0240 07/13/21 0222   *  --    K 4.3  --      --    CO2 26  --    BUN 36*  --    CREA 0.56  --    *  --    CA 8.6  --    MG  --  2.4   PHOS  --  2.1*     No results for input(s): ALT, AP, TBIL, TBILI, TP, ALB, GLOB, GGT, AML, LPSE in the last 72 hours. No lab exists for component: SGOT, GPT, AMYP, HLPSE  No results for input(s): INR, PTP, APTT, INREXT, INREXT in the last 72 hours. No results for input(s): FE, TIBC, PSAT, FERR in the last 72 hours. No results found for: FOL, RBCF   No results for input(s): PH, PCO2, PO2 in the last 72 hours. No results for input(s): CPK, CKNDX, TROIQ in the last 72 hours.     No lab exists for component: CPKMB  Lab Results   Component Value Date/Time    Cholesterol, total 163 02/14/2019 12:00 AM    HDL Cholesterol 57 02/14/2019 12:00 AM    LDL, calculated 88 02/14/2019 12:00 AM    Triglyceride 92 02/14/2019 12:00 AM     No results found for: [facility]  Lab Results   Component Value Date/Time    Color YELLOW/STRAW 05/24/2021 11:30 AM    Appearance CLEAR 05/24/2021 11:30 AM    Specific gravity 1.018 05/24/2021 11:30 AM    Specific gravity 1.020 06/03/2017 06:45 PM    pH (UA) 6.0 05/24/2021 11:30 AM    Protein 100 (A) 05/24/2021 11:30 AM    Glucose Negative 05/24/2021 11:30 AM    Ketone Negative 05/24/2021 11:30 AM    Bilirubin NEGATIVE  06/03/2017 06:45 PM    Urobilinogen 0.2 05/24/2021 11:30 AM    Nitrites Negative 05/24/2021 11:30 AM    Leukocyte Esterase MODERATE (A) 05/24/2021 11:30 AM    Epithelial cells MODERATE (A) 05/24/2021 11:30 AM    Bacteria 1+ (A) 05/24/2021 11:30 AM    WBC 5-10 05/24/2021 11:30 AM    RBC 0-5 05/24/2021 11:30 AM     Medications Reviewed:     Current Facility-Administered Medications   Medication Dose Route Frequency    levETIRAcetam (KEPPRA) tablet 500 mg  500 mg Oral BID    LORazepam (ATIVAN) injection 0.5 mg  0.5 mg IntraVENous Q6H PRN    amLODIPine (NORVASC) tablet 10 mg  10 mg Oral DAILY    hydrALAZINE (APRESOLINE) 20 mg/mL injection 10 mg  10 mg IntraVENous Q6H PRN    balsam peru-castor oiL (VENELEX) ointment   Topical Q8H    dexAMETHasone (DECADRON) tablet 2 mg  2 mg Oral Q12H    polyethylene glycol (MIRALAX) packet 17 g  17 g Oral DAILY    atenoloL (TENORMIN) tablet 25 mg  25 mg Oral DAILY    atorvastatin (LIPITOR) tablet 20 mg  20 mg Oral DAILY    pantoprazole (PROTONIX) tablet 40 mg  40 mg Oral ACB    sodium chloride (NS) flush 5-40 mL  5-40 mL IntraVENous Q8H    sodium chloride (NS) flush 5-40 mL  5-40 mL IntraVENous PRN    acetaminophen (TYLENOL) solution 650 mg  650 mg Oral Q4H PRN    HYDROcodone-acetaminophen (NORCO) 5-325 mg per tablet 1 Tablet  1 Tablet Oral Q4H PRN    morphine injection 2 mg  2 mg IntraVENous Q2H PRN    ondansetron (ZOFRAN) injection 4 mg  4 mg IntraVENous Q4H PRN    docusate sodium (COLACE) capsule 100 mg  100 mg Oral BID    lisinopriL (PRINIVIL, ZESTRIL) tablet 40 mg  40 mg Oral DAILY    metoclopramide HCl (REGLAN) tablet 5 mg  5 mg Oral BID    sodium chloride (NS) flush 5-40 mL  5-40 mL IntraVENous Q8H    sodium chloride (NS) flush 5-40 mL  5-40 mL IntraVENous PRN    acetaminophen (TYLENOL) tablet 650 mg  650 mg Oral Q6H PRN    Or    acetaminophen (TYLENOL) suppository 650 mg  650 mg Rectal Q6H PRN   ______________________________________________________________________  EXPECTED LENGTH OF STAY: 3d 0h  ACTUAL LENGTH OF STAY: [address], [place]

## 2021-07-16 ENCOUNTER — APPOINTMENT (OUTPATIENT)
Dept: INFUSION THERAPY | Age: 86
End: 2021-07-16

## 2021-07-16 ENCOUNTER — HOSPITAL ENCOUNTER (OUTPATIENT)
Dept: LAB | Age: 86
Discharge: HOME | End: 2021-07-16

## 2021-07-16 VITALS
SYSTOLIC BLOOD PRESSURE: 113 MMHG | DIASTOLIC BLOOD PRESSURE: 77 MMHG | HEART RATE: 84 BPM | RESPIRATION RATE: 16 BRPM | HEIGHT: 64 IN | BODY MASS INDEX: 21.66 KG/M2 | TEMPERATURE: 98.2 F | OXYGEN SATURATION: 94 % | WEIGHT: 126.9 LBS

## 2021-07-16 LAB
ALBUMIN SERPL-MCNC: 2.5 G/DL (ref 3.5–5)
ALBUMIN/GLOB SERPL: 0.8 {RATIO} (ref 1.1–2.2)
ALP SERPL-CCNC: 74 U/L (ref 45–117)
ALT SERPL-CCNC: 36 U/L (ref 12–78)
ANION GAP SERPL CALC-SCNC: 10 MMOL/L (ref 5–15)
AST SERPL-CCNC: 33 U/L (ref 15–37)
BASOPHILS # BLD: 0 K/UL (ref 0–0.1)
BASOPHILS NFR BLD: 0 % (ref 0–1)
BILIRUB SERPL-MCNC: 0.4 MG/DL (ref 0.2–1)
BUN SERPL-MCNC: 28 MG/DL (ref 6–20)
BUN/CREAT SERPL: 46 (ref 12–20)
CALCIUM SERPL-MCNC: 8.2 MG/DL (ref 8.5–10.1)
CHLORIDE SERPL-SCNC: 101 MMOL/L (ref 97–108)
CO2 SERPL-SCNC: 26 MMOL/L (ref 21–32)
CREAT SERPL-MCNC: 0.61 MG/DL (ref 0.55–1.02)
DIFFERENTIAL METHOD BLD: ABNORMAL
EOSINOPHIL # BLD: 0 K/UL (ref 0–0.4)
EOSINOPHIL NFR BLD: 0 % (ref 0–7)
ERYTHROCYTE [DISTWIDTH] IN BLOOD BY AUTOMATED COUNT: 14.3 % (ref 11.5–14.5)
GLOBULIN SER CALC-MCNC: 3 G/DL (ref 2–4)
GLUCOSE SERPL-MCNC: 113 MG/DL (ref 65–100)
HCT VFR BLD AUTO: 38 % (ref 35–47)
HGB BLD-MCNC: 12.7 G/DL (ref 11.5–16)
IMM GRANULOCYTES # BLD AUTO: 0.2 K/UL (ref 0–0.04)
IMM GRANULOCYTES NFR BLD AUTO: 1 % (ref 0–0.5)
LYMPHOCYTES # BLD: 1.3 K/UL (ref 0.8–3.5)
LYMPHOCYTES NFR BLD: 9 % (ref 12–49)
MCH RBC QN AUTO: 29.3 PG (ref 26–34)
MCHC RBC AUTO-ENTMCNC: 33.4 G/DL (ref 30–36.5)
MCV RBC AUTO: 87.8 FL (ref 80–99)
MONOCYTES # BLD: 0.8 K/UL (ref 0–1)
MONOCYTES NFR BLD: 5 % (ref 5–13)
NEUTS SEG # BLD: 13 K/UL (ref 1.8–8)
NEUTS SEG NFR BLD: 85 % (ref 32–75)
NRBC # BLD: 0 K/UL (ref 0–0.01)
NRBC BLD-RTO: 0 PER 100 WBC
PLATELET # BLD AUTO: 137 K/UL (ref 150–400)
PMV BLD AUTO: 10.4 FL (ref 8.9–12.9)
POTASSIUM SERPL-SCNC: 4.3 MMOL/L (ref 3.5–5.1)
PROT SERPL-MCNC: 5.5 G/DL (ref 6.4–8.2)
RBC # BLD AUTO: 4.33 M/UL (ref 3.8–5.2)
SODIUM SERPL-SCNC: 137 MMOL/L (ref 136–145)
WBC # BLD AUTO: 15.4 K/UL (ref 3.6–11)

## 2021-07-16 PROCEDURE — 85025 COMPLETE CBC W/AUTO DIFF WBC: CPT

## 2021-07-16 PROCEDURE — 74011250637 HC RX REV CODE- 250/637: Performed by: SPECIALIST

## 2021-07-16 PROCEDURE — 74011250637 HC RX REV CODE- 250/637: Performed by: INTERNAL MEDICINE

## 2021-07-16 PROCEDURE — 74011250637 HC RX REV CODE- 250/637: Performed by: NURSE PRACTITIONER

## 2021-07-16 PROCEDURE — 74011250636 HC RX REV CODE- 250/636: Performed by: NURSE PRACTITIONER

## 2021-07-16 PROCEDURE — 80053 COMPREHEN METABOLIC PANEL: CPT

## 2021-07-16 RX ORDER — LORAZEPAM 0.5 MG/1
0.5 TABLET ORAL
Qty: 21 TABLET | Refills: 0 | Status: SHIPPED | OUTPATIENT
Start: 2021-07-16 | End: 2021-07-23

## 2021-07-16 RX ORDER — DEXAMETHASONE 1 MG/1
2 TABLET ORAL
Qty: 60 TABLET | Refills: 0 | Status: SHIPPED | OUTPATIENT
Start: 2021-07-16 | End: 2021-07-16 | Stop reason: SDUPTHER

## 2021-07-16 RX ORDER — LEVETIRACETAM 500 MG/1
500 TABLET ORAL 2 TIMES DAILY
Qty: 60 TABLET | Refills: 0 | Status: SHIPPED | OUTPATIENT
Start: 2021-07-16 | End: 2021-08-15

## 2021-07-16 RX ORDER — PANTOPRAZOLE SODIUM 40 MG/1
40 TABLET, DELAYED RELEASE ORAL
Qty: 30 TABLET | Refills: 0 | Status: SHIPPED | OUTPATIENT
Start: 2021-07-16

## 2021-07-16 RX ORDER — LEVETIRACETAM 500 MG/1
500 TABLET ORAL 2 TIMES DAILY
Qty: 60 TABLET | Refills: 0 | Status: SHIPPED | OUTPATIENT
Start: 2021-07-16 | End: 2021-07-16 | Stop reason: SDUPTHER

## 2021-07-16 RX ORDER — ONDANSETRON 4 MG/1
8 TABLET, FILM COATED ORAL
Qty: 30 TABLET | Refills: 0 | Status: SHIPPED | OUTPATIENT
Start: 2021-07-16 | End: 2021-08-15

## 2021-07-16 RX ORDER — METOCLOPRAMIDE 5 MG/1
5 TABLET ORAL 2 TIMES DAILY
Qty: 60 TABLET | Refills: 0 | Status: SHIPPED | OUTPATIENT
Start: 2021-07-16 | End: 2021-08-15

## 2021-07-16 RX ORDER — ATENOLOL 25 MG/1
12.5 TABLET ORAL DAILY
Qty: 15 TABLET | Refills: 0 | Status: SHIPPED | OUTPATIENT
Start: 2021-07-16 | End: 2021-07-16 | Stop reason: SDUPTHER

## 2021-07-16 RX ORDER — ATENOLOL 25 MG/1
12.5 TABLET ORAL DAILY
Qty: 15 TABLET | Refills: 0 | Status: SHIPPED | OUTPATIENT
Start: 2021-07-16 | End: 2021-08-15

## 2021-07-16 RX ORDER — DEXAMETHASONE 1 MG/1
2 TABLET ORAL
Qty: 60 TABLET | Refills: 0 | Status: SHIPPED | OUTPATIENT
Start: 2021-07-16 | End: 2021-08-15

## 2021-07-16 RX ADMIN — DEXAMETHASONE 2 MG: 1 TABLET ORAL at 08:58

## 2021-07-16 RX ADMIN — METOCLOPRAMIDE 5 MG: 10 TABLET ORAL at 08:58

## 2021-07-16 RX ADMIN — Medication: at 06:00

## 2021-07-16 RX ADMIN — LEVETIRACETAM 500 MG: 500 TABLET ORAL at 08:58

## 2021-07-16 RX ADMIN — PANTOPRAZOLE SODIUM 40 MG: 40 TABLET, DELAYED RELEASE ORAL at 08:57

## 2021-07-16 RX ADMIN — PANTOPRAZOLE SODIUM 40 MG: 40 TABLET, DELAYED RELEASE ORAL at 07:30

## 2021-07-16 RX ADMIN — POLYETHYLENE GLYCOL 3350 17 G: 17 POWDER, FOR SOLUTION ORAL at 09:00

## 2021-07-16 RX ADMIN — DOCUSATE SODIUM 100 MG: 100 CAPSULE, LIQUID FILLED ORAL at 08:58

## 2021-07-16 RX ADMIN — ATORVASTATIN CALCIUM 20 MG: 20 TABLET, FILM COATED ORAL at 09:06

## 2021-07-16 RX ADMIN — LISINOPRIL 40 MG: 20 TABLET ORAL at 09:02

## 2021-07-16 NOTE — PROGRESS NOTES
Problem: Falls - Risk of  Goal: *Absence of Falls  Description: Document Jeniffer Jefferson Fall Risk and appropriate interventions in the flowsheet.   Outcome: Progressing Towards Goal  Note: Fall Risk Interventions:  Mobility Interventions: Patient to call before getting OOB, PT Consult for mobility concerns, PT Consult for assist device competence, Strengthening exercises (ROM-active/passive), Bed/chair exit alarm    Mentation Interventions: Door open when patient unattended    Medication Interventions: Patient to call before getting OOB, Teach patient to arise slowly, Bed/chair exit alarm    Elimination Interventions: Patient to call for help with toileting needs, Call light in reach, Bed/chair exit alarm    History of Falls Interventions: Door open when patient unattended, Bed/chair exit alarm, Utilize gait belt for transfer/ambulation, Room close to nurse's station         Problem: Brain Tumor Discharge Outcomes  Goal: *Progress independence mobility/activities (eg: Mobility precautions)  Outcome: Progressing Towards Goal  Goal: *Adequate oxygenation  Outcome: Progressing Towards Goal  Goal: *Tolerates nutrition therapy  Outcome: Progressing Towards Goal  Goal: *Verbalizes understanding of type and use of pain medication  Outcome: Progressing Towards Goal     Problem: Breathing Pattern - Ineffective  Goal: *Absence of hypoxia  Outcome: Progressing Towards Goal

## 2021-07-16 NOTE — PROGRESS NOTES
Transition of Care Plan to SNF/Rehab    SNF/Rehab Transition:  Patient has been accepted to [facility] and meets criteria for admission. Patient will transported by (chiquis Dia) and expected to leave at 9:55am.    Communication to Patient/Family:  Met with patient and (chiquis Linder)(identified care giver) and they are agreeable to the transition plan. Communication to SNF/Rehab:  Bedside RN, Gail Aguero, has been notified to update the transition plan to the facility and call report (phone number [phone]). Discharge information has been updated on the AVS.     Discharge instructions to be fax'd to facility at [facility] # [phone]). Nursing Please include all hard scripts for controlled substances, med rec and dc summary, and AVS in packet. SNF/Rehab Transition:  Reviewed and confirmed with facility, [facility] they can manage the patient care needs for the following:     Aakash Nicole with (X) only those applicable:    Medication:  [x]  Medications will be available at the facility  []  IV Antibiotics  []  Controlled Substance - hard copy to be sent with patient   []  Weekly Labs   Documents:  [x] Hard RX  [x] MAR  [x] Kardex  [x] AVS  []Transfer Summary  [x]Discharge   Equipment:  []  CPAP/BiPAP  []  Wound Vacuum  []  [place] or Urinary Device  []  PICC/Central Line  []  Nebulizer  []  Ventilator   Treatment:  []Isolation (for MRSA, VRE, etc.)  []Surgical Drain Management  []Tracheostomy Care  []Dressing Changes  []Dialysis with transportation and chair time. []PEG Care  []Oxygen  []Daily Weights for Heart Failure   Dietary:  []Any diet limitations  []Tube Feedings   []Total Parenteral Management (TPN)   Eligible for Medicaid Long Term Services and Supports  Yes:  [] Eligible for medical assistance or will become eligible within 180 days and UAI completed. [] Provider/Patient and/or support system has requested screening.   [] UAI copy provided to patient or responsible party, .  [] UAI unavailable at discharge will send once processed to SNF provider. [] UAI unavailable at discharged mailed to patient  No:   [] Private pay and is not financially eligible for Medicaid within the next 180 days. [] Reside out-of-state. [] A residents of a state owned/operated facility that is licensed  by [address] and [employer] [facility] or [facility]  [] Enrollment in [facility] hospice services  [] [address] [place] [address]  [] Patient /Family declines to have screening completed or provide financial information for screening     Financial Resources:  Medicaid    [] Initiated and application pending   [] Full coverage     Advanced Care Plan:  []Surrogate Decision Maker of Care  [x]POA  [x]Communicated Code Status ( \"Full\")    Other     Medicare pt has received, reviewed, and signed 2nd IM letter informing them of their right to appeal the discharge. Signed copy has been placed on pt bedside chart.     Fitz Ernandez

## 2021-07-16 NOTE — DISCHARGE SUMMARY
Discharge Summary       PATIENT ID: Esperanza Nunez  MRN: [id number]   YOB: 1932    DATE OF ADMISSION: 7/2/2021 10:05 PM    DATE OF DISCHARGE: 7/16/21   PRIMARY CARE PROVIDER: Hernandez Christopher NP     ATTENDING PHYSICIAN: Merlin Goon  DISCHARGING PROVIDER: Bhavesh Guzmán MD    To contact this individual call [phone] and ask the  to page. If unavailable ask to be transferred the Adult Hospitalist Department. CONSULTATIONS: IP CONSULT TO HOSPITALIST  IP CONSULT TO HOSPITALIST  IP CONSULT TO ONCOLOGY  IP CONSULT TO RADIATION ONCOLOGY  IP CONSULT TO NEUROSURGERY    PROCEDURES/SURGERIES: Procedure(s):  LEFT FRONTAL CRANIOTOMY WITH RESECTION INTRA CRANIAL METASTASIS WITH BRAIN LAB (URGENT)    ADMITTING [address] [place] [address] COURSE:   89F p/w mets to brain      Assessment & Plan:      1. 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 HTN: - reduced atenolol to 12.5 rest d/c'd due to low BP   3. HLD- Continue atorvastatin   4. CVA hx- Plavix held  5.  Cont reglan/ zofran             DISCHARGE DIAGNOSES / PLAN:      1. D/c to SNF     ADDITIONAL CARE RECOMMENDATIONS:        PENDING TEST RESULTS:   At the time of discharge the following test results are still pending:     FOLLOW UP APPOINTMENTS:    Follow-up Information     Follow up With Specialties Details Why Contact Katarina Penaloza MD Neurosurgery Schedule an appointment as soon as possible for a visit office to call patient to schedule gamma knife appointment [address] [place] [address]  [id number] [address]      Cynthia Crooks MD Neurosurgery Schedule an appointment as soon as possible for a visit in 10 days For suture removal - if gamma knife is scheduled within this time frame then sutures can be removed there [place] [address]  [phone] Abhi Weiner, NP Nurse Practitioner In 1 week readjust BP meds  [address]  [address] [place] [address] [phone]               DIET: Regular Diet and Cardiac Diet    ACTIVITY: Activity as tolerated    WOUND CARE:     EQUIPMENT needed:       DISCHARGE MEDICATIONS:  Current Discharge Medication List      START taking these medications    Details   levETIRAcetam (KEPPRA) 500 mg tablet Take 1 Tablet by mouth two (2) times a day for 30 days. Qty: 60 Tablet, Refills: 0  Start date: 7/16/2021, End date: 8/15/2021      dexAMETHasone (DECADRON) 1 mg tablet Take 2 Tablets by mouth Daily (before breakfast) for 30 days. Qty: 60 Tablet, Refills: 0  Start date: 7/16/2021, End date: 8/15/2021         CONTINUE these medications which have CHANGED    Details   atenoloL (TENORMIN) 25 mg tablet Take 0.5 Tablets by mouth daily for 30 days. Qty: 15 Tablet, Refills: 0  Start date: 7/16/2021, End date: 8/15/2021      metoclopramide HCl (REGLAN) 5 mg tablet Take 1 Tablet by mouth two (2) times a day for 30 days. Qty: 60 Tablet, Refills: 0  Start date: 7/16/2021, End date: 8/15/2021      ondansetron hcl (Zofran) 4 mg tablet Take 2 Tablets by mouth every eight (8) hours as needed for Nausea or Vomiting for up to 30 days. Qty: 30 Tablet, Refills: 0  Start date: 7/16/2021, End date: 8/15/2021      pantoprazole (PROTONIX) 40 mg tablet Take 1 Tablet by mouth Daily (before breakfast). Qty: 30 Tablet, Refills: 0  Start date: 7/16/2021         CONTINUE these medications which have NOT CHANGED    Details   atorvastatin (LIPITOR) 20 mg tablet TAKE 1 TABLET NIGHTLY  Qty: 90 Tab, Refills: 5    Associated Diagnoses: Pure hypercholesterolemia; Cerebrovascular accident (CVA), unspecified mechanism ([address]      multivitamin (ONE A DAY) tablet Take 1 Tablet by mouth daily.          STOP taking these medications       lisinopriL (PRINIVIL, ZESTRIL) 40 mg tablet Comments:   Reason for Stopping:         clopidogreL (Plavix) 75 mg tab Comments: Reason for Stopping:         lisinopril (PRINIVIL, ZESTRIL) 40 mg tablet Comments:   Reason for Stopping:                 NOTIFY YOUR PHYSICIAN FOR ANY OF THE FOLLOWING:   Fever over 101 degrees for 24 hours. Chest pain, shortness of breath, fever, chills, nausea, vomiting, diarrhea, change in mentation, falling, weakness, bleeding. Severe pain or pain not relieved by medications. Or, any other signs or symptoms that you may have questions about. DISPOSITION:    Home With:   OT  PT  HH  RN      x Long term SNF/Inpatient Rehab    Independent/assisted living    Hospice    Other:       PATIENT CONDITION AT DISCHARGE:     Functional status   x Poor     Deconditioned     Independent      Cognition     Lucid    x Forgetful     Dementia      Catheters/lines (plus indication)    [place]     PICC     PEG    x None      Code status   x  Full code     DNR      PHYSICAL EXAMINATION AT DISCHARGE:  General:          Alert, cooperative, no distress, appears stated age. HEENT:           Atraumatic, anicteric sclerae, pink conjunctivae                          No oral ulcers, mucosa moist, throat clear, dentition fair  Neck:               Supple, symmetrical  Lungs:             Clear to auscultation bilaterally. No Wheezing or Rhonchi. No rales. Chest wall:      No tenderness  No Accessory muscle use. Heart:              Regular  rhythm,  No  murmur   No edema  Abdomen:        Soft, non-tender. Not distended. Bowel sounds normal  Extremities:     No cyanosis. No clubbing,                            Skin turgor normal, Capillary refill normal  Skin:                Not pale. Not Jaundiced  No rashes   Psych:             Not anxious or agitated.   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 ([facility] [address] ICD-10-CM: C79.31  ICD-9-CM: 198.3  7/12/2021 - Present        Brain mass ICD-10-CM: G93.89  ICD-9-CM: 348.89  7/2/2021 - Present        Stenosis of both internal carotid arteries ICD-10-CM: I65.23  ICD-9-CM: 433.10, 433.30  7/19/2017 - Present        Cerebral microvascular disease ICD-10-CM: I67.89  ICD-9-CM: 437.8  7/19/2017 - Present        HTN (hypertension) ICD-10-CM: I10  ICD-9-CM: 401.9  11/14/2014 - Present        Hyperlipidemia ICD-10-CM: E78.5  ICD-9-CM: 272.4  11/14/2014 - Present        Menopausal state ICD-10-CM: N95.1  ICD-9-CM: 627.2  11/12/2013 - Present        Other and unspecified hyperlipidemia ICD-10-CM: E78.5  ICD-9-CM: 272.4  11/12/2013 - Present        OA (osteoarthritis) ICD-10-CM: M19.90  ICD-9-CM: 715.90  9/4/2013 - Present        Stroke ([address] ICD-10-CM: I63.9  ICD-9-CM: 434.91  9/4/2013 - Present        RESOLVED: Tick bite ICD-10-CM: W57. Jus Reusing  ICD-9-CM: 919.4, E906.4  9/4/2013 - 6/10/2014              Greater than 30  minutes were spent with the patient on counseling and coordination of care    Signed:   Claudetta Standing, MD  7/16/2021  9:22 AM

## 2021-07-16 NOTE — DISCHARGE INSTRUCTIONS
After Hospital Care Plan:  Discharge Instructions Craniot[facility]    Patient Name: Cecilia Rodriguez    Date of procedure: 7/7/2021  Date of discharge: 7/12/2021    Procedure: Procedure(s):  LEFT FRONTAL CRANIOTOMY WITH RESECTION INTRA CRANIAL METASTASIS WITH BRAIN LAB (URGENT)  PCP: Zander Mayers NP    **Sutures to me removed on 07/21/21. This can be done at the SNF.**    Follow up appointments  Follow up with your neurosurgeon in 10-14 days. Call [phone] to make an appointment as soon as you get home from the hospital.  Follow-up care is a key part of your treatment and safety. Be sure to make and go to all appointments, and call your doctor if you are having problems. It's also a good idea to know your test results and keep a list of the medicines you take. A craniotomy is surgery to open your skull to fix a problem in your brain. It can be done for many reasons. For example, you may need a craniotomy if your brain or blood vessels are damaged or if you have a tumor or an infection in your brain. You will probably feel very tired for several weeks after surgery. You may also have headaches or problems concentrating. It can take 4 to 8 weeks to recover from surgery. Your cuts (incisions) may be sore for about 5 days after surgery. You may also have numbness and shooting pains near your wound, or swelling and bruising around your eyes. As your wound starts to heal, it may begin to itch. Medicines and ice packs can help with headaches, pain, swelling, and itching. The stitches that hold your incisions together may go away on their own or will be removed in 7 to 10 days. This depends on the type of stitches the doctor uses. Staples are usually removed in 10-14 days. It is common for your scalp to swell with fluid. After the swelling goes down, you may have a dent in your head. Some kinds of plates stay attached to hold the skull flap to your head.  If your head was shaved, you may wear clean hats or scarves on your head until your hair grows back. This care sheet gives you a general idea about how long it will take for you to recover. But each person recovers at a different pace. Follow the steps below to get better as quickly as possible. When to call your Neurosurgeon:   You have trouble thinking clearly.  You are sleeping more than you are awake.  You have a fever with a stiff neck or a severe headache.  You have any sudden vision changes.  Nausea or vomiting, severe headache.  Loss of bowel or bladder function, inability to urinate.  Increased weakness-greater than before your surgery.  Severe pain or pain not relieved by medications.  Signs of a blood clot in your leg-calf pain, tenderness, redness, swelling of lower leg.  Signs of infection-if your incision is red; continues to have drainage; drainage has a foul odor or if you have a persistent fever over 101 degrees for 24 hours.  You fall and hit your head. When to call your Primary Care Physician:   Concerns about medical conditions such as diabetes, high blood pressure, asthma, congestive heart failure.  Call if blood sugars are elevated, persistent headache or dizziness, coughing or congestion, constipation or diarrhea, burning with urination, abnormal heart rate. When to call 911 and go to the nearest emergency room:   Acute onset of chest pain, shortness of breath, difficulty breathing   You passed out (lost consciousness).  It is hard to think, move, speak, or see.  Your body is jerking or shaking. Activity   Rest when you feel tired. It is normal to want to sleep during the day. It is a good idea to plan to take a nap every day. Getting enough sleep will help you recover.  Try not to lie flat when you rest or sleep. You can use a wedge pillow, or you can put a rolled towel or foam padding under your pillow.  You can also raise the head of your bed by putting bricks or wooden blocks under the bed legs.  After lying down, bring your head up slowly. This can prevent headaches or dizziness.  Try to walk each day. Start by walking a little more than you did the day before. Bit by bit, increase the amount you walk. Walking boosts blood flow and helps prevent pneumonia and constipation.  Avoid heavy lifting until your doctor says it is okay.  Do not drive for 2 to 3 weeks or until your doctor says it is okay. When you begin driving again, start with short, familiar routes in the daytime.  Ask your doctor if it is safe for you to travel by plane.  Avoid risky activities, such as climbing a ladder, for 3 months after surgery.  Avoid strenuous activities, such as bicycle riding, jogging, weight lifting, or aerobic exercise, for 3 months or until your doctor says it is okay.  Do not play any rough or contact sports for 3 months or until your doctor says it is okay.  You may engage in sexual activity. Diet   Resume usual diet; drink plenty of fluids; eat foods high in fiber   It is important to have regular bowel movements. Pain medications may cause constipation. You may want to take a stool softener (such as Senokot-S or Colace) to prevent constipation.  If constipation occurs, take a laxative (such as Dulcolax tablets, Milk of Magnesia, or a suppository). Laxatives should only be used if the above preventable measures have failed and you still have not had a bowel movement after three days   Try to avoid constipation and straining with bowel movements. Incision Care      You can wash your hair 2 to 3 days after your surgery. But do not soak your head or swim for 2 to 3 weeks.  Do not dye or color your hair for 4 weeks after your surgery.  Do not rub or apply any lotions or ointments to your incision site.  Do not scrub your wound    Medicines   If your doctor or nurse practitioner prescribed antibiotics, take them as directed.  Do not stop taking them just because you feel better. You need to take the full course of antibiotics.  If you get medicines to prevent seizures, take them exactly as directed.  If the doctor or nurse practitioner gave you a prescription medicine for pain, take it as prescribed.  If you are not taking a prescription pain medicine, ask your doctor or nurse practitioner if you can take an over-the-counter medicine.    Pain Medication Safety  DO:   Read the Medication Guide    Take your medicine exactly as prescribed    Store your medicine away from children and in a safe place    Call your healthcare provider for medical advice about side effects. You may report side effects to FDA at 3-035-FDA-5080.  Please be aware that many medications contain Tylenol. We do not want you to over medicate so please read the information below as a guide. Do not take more than 4 Grams of Tylenol in a 24 hour period if you are under age 79 or 3 Grams in 24 hours if you are over 79years old. (There are 1000 milligrams in one Gram)  o Percocet contains 325 mg of Tylenol per tablet (do not take more than 12 tablets in 24 hours)  o Lortab contains 500 mg of Tylenol per tablet (do not take more than 8 tablets in 24 hours)  o Norco contains 325 mg of Tylenol per tablet (do not take more than 12 tablets in 24 hours). DO NOT:   Do not give your medicine to others.  Do not take medicine unless it was prescribed for you.  Do not stop taking your medicine without talking to your healthcare provider.  Do not break, chew, crush, dissolve, or inject your medicine. If you cannot swallow your medicine whole, talk to your healthcare provider.  Do not drink alcohol while taking this medicine.  Do not take anti-inflammatory medications or aspirin unless instructed by your physician.        Discharge Instructions       PATIENT ID: Ely Bejarano  MRN: [id number]   YOB: 1932    DATE OF ADMISSION: 7/2/2021 10:05 PM    DATE OF DISCHARGE: 7/16/2021    PRIMARY CARE PROVIDER: Jonnathan Gunter NP     ATTENDING PHYSICIAN: Hermila Montalvo MD  DISCHARGING PROVIDER: Maximiliano Jasmine MD    To contact this individual call [phone] and ask the  to page. If unavailable ask to be transferred the Adult Hospitalist Department.     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    CONSULTATIONS: IP CONSULT TO HOSPITALIST  IP CONSULT TO HOSPITALIST  IP CONSULT TO ONCOLOGY  IP CONSULT TO RADIATION ONCOLOGY  IP CONSULT TO NEUROSURGERY    PROCEDURES/SURGERIES: Procedure(s):  LEFT FRONTAL CRANIOTOMY WITH RESECTION INTRA CRANIAL METASTASIS WITH BRAIN LAB (URGENT)    PENDING TEST RESULTS:   At the time of discharge the following test results are still pending:     FOLLOW UP APPOINTMENTS:   Follow-up Information     Follow up With Specialties Details Why Contact Info    Boubacar Fang MD Neurosurgery Schedule an appointment as soon as possible for a visit office to call patient to schedule gamma knife appointment [address] [place] [address]      Naz Boyce MD Neurosurgery Schedule an appointment as soon as possible for a visit in 10 days For suture removal - if gamma knife is scheduled within this time frame then sutures can be removed there [address] [facility] [address]      Jonnathan Gunter NP Nurse Practitioner In 1 week readjust BP meds  [phone] [id number] [phone]             ADDITIONAL CARE RECOMMENDATIONS:   Please keep your appointments as scheduled   Your BP medications are changed and stop plavix for 15 days d/w Neuro surgery when you can resume plavix     DIET: Regular Diet and Cardiac Diet    ACTIVITY: Activity as tolerated    WOUND CARE:     EQUIPMENT needed:       Radiology      DISCHARGE MEDICATIONS:   See Medication Reconciliation Form    · It is important that you take the medication exactly as they are prescribed. · Keep your medication in the bottles provided by the pharmacist and keep a list of the medication names, dosages, and times to be taken in your wallet. · Do not take other medications without consulting your doctor. NOTIFY YOUR PHYSICIAN FOR ANY OF THE FOLLOWING:   Fever over 101 degrees for 24 hours. Chest pain, shortness of breath, fever, chills, nausea, vomiting, diarrhea, change in mentation, falling, weakness, bleeding. Severe pain or pain not relieved by medications. Or, any other signs or symptoms that you may have questions about.       DISPOSITION:    Home With:   OT  PT  HH  RN      x SNF/Inpatient Rehab/LTAC    Independent/assisted living    Hospice    Other:     CDMP Checked:   Yes x     PROBLEM LIST Updated:  Yes x       Signed:   Sonya Smith MD  7/16/2021  9:15 AM

## 2021-07-16 NOTE — TELEPHONE ENCOUNTER
HIPAA verified. Patient remains in hospital, she should be discharged on 7/16/2021. Patient will need to be rescheduled for chemo.

## 2021-07-19 ENCOUNTER — HOSPITAL ENCOUNTER (OUTPATIENT)
Dept: INFUSION THERAPY | Age: 86
Discharge: HOME | End: 2021-07-19
Payer: MEDICARE

## 2021-07-19 ENCOUNTER — DOCUMENTATION ONLY (OUTPATIENT)
Dept: ONCOLOGY | Age: 86
End: 2021-07-19

## 2021-07-19 VITALS
BODY MASS INDEX: 21.8 KG/M2 | SYSTOLIC BLOOD PRESSURE: 149 MMHG | WEIGHT: 126.98 LBS | HEART RATE: 62 BPM | DIASTOLIC BLOOD PRESSURE: 92 MMHG | TEMPERATURE: 98.7 F | RESPIRATION RATE: 17 BRPM

## 2021-07-19 DIAGNOSIS — C79.31 MALIGNANT MELANOMA METASTATIC TO BRAIN (HCC): Primary | ICD-10-CM

## 2021-07-19 LAB
ALBUMIN SERPL-MCNC: 2.6 G/DL (ref 3.5–5)
ALBUMIN/GLOB SERPL: 0.7 {RATIO} (ref 1.1–2.2)
ALP SERPL-CCNC: 83 U/L (ref 45–117)
ALT SERPL-CCNC: 40 U/L (ref 12–78)
ANION GAP SERPL CALC-SCNC: 10 MMOL/L (ref 5–15)
AST SERPL-CCNC: 31 U/L (ref 15–37)
BASOPHILS # BLD: 0 K/UL (ref 0–0.1)
BASOPHILS NFR BLD: 0 % (ref 0–1)
BILIRUB SERPL-MCNC: 0.3 MG/DL (ref 0.2–1)
BUN SERPL-MCNC: 24 MG/DL (ref 6–20)
BUN/CREAT SERPL: 43 (ref 12–20)
CALCIUM SERPL-MCNC: 8.8 MG/DL (ref 8.5–10.1)
CHLORIDE SERPL-SCNC: 102 MMOL/L (ref 97–108)
CO2 SERPL-SCNC: 28 MMOL/L (ref 21–32)
CREAT SERPL-MCNC: 0.56 MG/DL (ref 0.55–1.02)
DIFFERENTIAL METHOD BLD: ABNORMAL
EOSINOPHIL # BLD: 0.1 K/UL (ref 0–0.4)
EOSINOPHIL NFR BLD: 1 % (ref 0–7)
ERYTHROCYTE [DISTWIDTH] IN BLOOD BY AUTOMATED COUNT: 14.5 % (ref 11.5–14.5)
GLOBULIN SER CALC-MCNC: 3.6 G/DL (ref 2–4)
GLUCOSE SERPL-MCNC: 138 MG/DL (ref 65–100)
HCT VFR BLD AUTO: 38.2 % (ref 35–47)
HGB BLD-MCNC: 12.8 G/DL (ref 11.5–16)
IMM GRANULOCYTES # BLD AUTO: 0.1 K/UL (ref 0–0.04)
IMM GRANULOCYTES NFR BLD AUTO: 1 % (ref 0–0.5)
LYMPHOCYTES # BLD: 2.1 K/UL (ref 0.8–3.5)
LYMPHOCYTES NFR BLD: 23 % (ref 12–49)
MCH RBC QN AUTO: 29.8 PG (ref 26–34)
MCHC RBC AUTO-ENTMCNC: 33.5 G/DL (ref 30–36.5)
MCV RBC AUTO: 89 FL (ref 80–99)
MONOCYTES # BLD: 0.5 K/UL (ref 0–1)
MONOCYTES NFR BLD: 5 % (ref 5–13)
NEUTS SEG # BLD: 6.3 K/UL (ref 1.8–8)
NEUTS SEG NFR BLD: 70 % (ref 32–75)
NRBC # BLD: 0 K/UL (ref 0–0.01)
NRBC BLD-RTO: 0 PER 100 WBC
PLATELET # BLD AUTO: 145 K/UL (ref 150–400)
PLATELET COMMENTS,PCOM: ABNORMAL
PMV BLD AUTO: 10.2 FL (ref 8.9–12.9)
POTASSIUM SERPL-SCNC: 3.7 MMOL/L (ref 3.5–5.1)
PROT SERPL-MCNC: 6.2 G/DL (ref 6.4–8.2)
RBC # BLD AUTO: 4.29 M/UL (ref 3.8–5.2)
RBC MORPH BLD: ABNORMAL
SODIUM SERPL-SCNC: 140 MMOL/L (ref 136–145)
TSH SERPL DL<=0.05 MIU/L-ACNC: 2.51 UIU/ML (ref 0.36–3.74)
WBC # BLD AUTO: 9.1 K/UL (ref 3.6–11)

## 2021-07-19 PROCEDURE — 36415 COLL VENOUS BLD VENIPUNCTURE: CPT

## 2021-07-19 PROCEDURE — 85025 COMPLETE CBC W/AUTO DIFF WBC: CPT

## 2021-07-19 PROCEDURE — 84443 ASSAY THYROID STIM HORMONE: CPT

## 2021-07-19 PROCEDURE — 74011250636 HC RX REV CODE- 250/636: Performed by: INTERNAL MEDICINE

## 2021-07-19 PROCEDURE — 96413 CHEMO IV INFUSION 1 HR: CPT

## 2021-07-19 PROCEDURE — 80053 COMPREHEN METABOLIC PANEL: CPT

## 2021-07-19 PROCEDURE — 74011000258 HC RX REV CODE- 258: Performed by: INTERNAL MEDICINE

## 2021-07-19 RX ORDER — DIPHENHYDRAMINE HYDROCHLORIDE 50 MG/ML
25 INJECTION, SOLUTION INTRAMUSCULAR; INTRAVENOUS AS NEEDED
Status: DISCONTINUED | OUTPATIENT
Start: 2021-07-19 | End: 2021-07-20 | Stop reason: HOSPADM

## 2021-07-19 RX ORDER — SODIUM CHLORIDE 9 MG/ML
25 INJECTION, SOLUTION INTRAVENOUS CONTINUOUS
Status: DISCONTINUED | OUTPATIENT
Start: 2021-07-19 | End: 2021-07-20 | Stop reason: HOSPADM

## 2021-07-19 RX ORDER — SODIUM CHLORIDE 0.9 % (FLUSH) 0.9 %
10 SYRINGE (ML) INJECTION AS NEEDED
Status: DISCONTINUED | OUTPATIENT
Start: 2021-07-19 | End: 2021-07-20 | Stop reason: HOSPADM

## 2021-07-19 RX ORDER — ACETAMINOPHEN 325 MG/1
650 TABLET ORAL AS NEEDED
Status: DISCONTINUED | OUTPATIENT
Start: 2021-07-19 | End: 2021-07-20 | Stop reason: HOSPADM

## 2021-07-19 RX ORDER — ONDANSETRON 2 MG/ML
8 INJECTION INTRAMUSCULAR; INTRAVENOUS AS NEEDED
Status: DISCONTINUED | OUTPATIENT
Start: 2021-07-19 | End: 2021-07-20 | Stop reason: HOSPADM

## 2021-07-19 RX ADMIN — SODIUM CHLORIDE 200 MG: 9 INJECTION, SOLUTION INTRAVENOUS at 13:16

## 2021-07-19 NOTE — PROGRESS NOTES
Reviewed for patient Saw Zarate , a 80 y.o. female the Current Chemotherapy Regimen for Metastatic Melanoma including  Pembrolizumab. Daughter was present in room and obtained permission to discuss drug regimen with visitor(s) present. Reviewed indication, side effects, adverse reactions, when to notify staff. The following points were also discussed.    Adverse reactions of ICI and when to report to clinic  BRAF mutation test results and the appropriateness of therapy      Signed:  DOT CardonaD   1:43 PM    Contact information Inpatient pharmacy [id number]

## 2021-07-19 NOTE — PROGRESS NOTES
[facility] Progress Note    Date: 2021    Name: Esperanza Nunez    MRN: [id number]         : 1932    Ms. Mark Garvin arrived via wheelchair for cycle 1 of Keytruda regimen. Assessment was completed. Pt's daughter present and answers most questions. Pt drowsy and slept thru most of treatment. Daughter reports from [facility] pt is on. Surgical sutures intact on left side of head. After numerous attempts IV was started in right forearm with 22 gauge catheter . Labs drawn. Pt was assisted  to bathroom with help of daughter after pt reported she needed to get there quickly. Pt noted to have BM in diaper. Did have another BM while on toilet. Daughter reports her Mom has had issues with constipation recently. Pt unable to ambulate on her own. Chemotherapy Flowsheet 2021   Cycle C 1   Date 2021   Drug / Regimen Keytruda   Dosage 200   Time Up 1316   Time Down 1350               Ms. Niyah's vitals were reviewed. Visit Vitals  BP (!) 149/92   Pulse 62   Temp 98.7 °F (37.1 °C)   Resp 17   Wt 57.6 kg (126 lb 15.8 oz)   BMI 21.80 kg/m²       Lab results were obtained and reviewed. Recent Results (from the past 12 hour(s))   CBC WITH AUTOMATED DIFF    Collection Time: 21 11:00 AM   Result Value Ref Range    WBC 9.1 3.6 - 11.0 K/uL    RBC 4.29 3.80 - 5.20 M/uL    HGB 12.8 11.5 - 16.0 g/dL    HCT 38.2 35.0 - 47.0 %    MCV 89.0 80.0 - 99.0 FL    MCH 29.8 26.0 - 34.0 PG    MCHC 33.5 30.0 - 36.5 g/dL    RDW 14.5 11.5 - 14.5 %    PLATELET 638 (L) 091 - 400 K/uL    MPV 10.2 8.9 - 12.9 FL    NRBC 0.0 0  WBC    ABSOLUTE NRBC 0.00 0.00 - 0.01 K/uL    NEUTROPHILS 70 32 - 75 %    LYMPHOCYTES 23 12 - 49 %    MONOCYTES 5 5 - 13 %    EOSINOPHILS 1 0 - 7 %    BASOPHILS 0 0 - 1 %    IMMATURE GRANULOCYTES 1 (H) 0.0 - 0.5 %    ABS. NEUTROPHILS 6.3 1.8 - 8.0 K/UL    ABS. LYMPHOCYTES 2.1 0.8 - 3.5 K/UL    ABS. MONOCYTES 0.5 0.0 - 1.0 K/UL    ABS. EOSINOPHILS 0.1 0.0 - 0.4 K/UL    ABS.  BASOPHILS 0.0 0.0 - 0.1 K/UL    ABS. IMM. GRANS. 0.1 (H) 0.00 - 0.04 K/UL    DF AUTOMATED      PLATELET COMMENTS ADEQUATE PLATELETS      RBC COMMENTS ANISOCYTOSIS  1+       METABOLIC PANEL, COMPREHENSIVE    Collection Time: 07/19/21 11:00 AM   Result Value Ref Range    Sodium 140 136 - 145 mmol/L    Potassium 3.7 3.5 - 5.1 mmol/L    Chloride 102 97 - 108 mmol/L    CO2 28 21 - 32 mmol/L    Anion gap 10 5 - 15 mmol/L    Glucose 138 (H) 65 - 100 mg/dL    BUN 24 (H) 6 - 20 MG/DL    Creatinine 0.56 0.55 - 1.02 MG/DL    BUN/Creatinine ratio 43 (H) 12 - 20      GFR est AA >60 >60 ml/min/1.73m2    GFR est non-AA >60 >60 ml/min/1.73m2    Calcium 8.8 8.5 - 10.1 MG/DL    Bilirubin, total 0.3 0.2 - 1.0 MG/DL    ALT (SGPT) 40 12 - 78 U/L    AST (SGOT) 31 15 - 37 U/L    Alk. phosphatase 83 45 - 117 U/L    Protein, total 6.2 (L) 6.4 - 8.2 g/dL    Albumin 2.6 (L) 3.5 - 5.0 g/dL    Globulin 3.6 2.0 - 4.0 g/dL    A-G Ratio 0.7 (L) 1.1 - 2.2     TSH 3RD GENERATION    Collection Time: 07/19/21 11:00 AM   Result Value Ref Range    TSH 2.51 0.36 - 3.74 uIU/mL     Keytruda 200 mg was given over 30 minutes . Pt slept through treatment. At completion IV D/C'd from intact site. Assisted to wheelchair with help of [facility] employee and daughter. Ms. Salud Mayo tolerated treatment well and was discharged from [place] [address] in stable condition at 1420 . She is to return on for Cycle 2 on August 9 @ 1000. Reviewed discharge instructions with pt'. s daughter.      Sandra Underwood RN  July 19, 2021

## 2021-07-19 NOTE — PROGRESS NOTES
Problem: Chemotherapy Treatment  Goal: *Chemotherapy regimen followed  Outcome: Resolved/Met  Goal: *Hemodynamically stable  Outcome: Resolved/Met  Goal: *Tolerating diet  Outcome: Resolved/Met     Problem: Altered Thought Process (Adult)  Goal: *Participates in Treatment Plan  Outcome: Resolved/Met     Problem: Knowledge Deficit  Goal: *Verbalizes understanding of procedures and medications  Outcome: Resolved/Met     Problem: Patient Education:  Go to Education Activity  Goal: Patient/Family Education  Outcome: Resolved/Met

## 2021-07-19 NOTE — DISCHARGE INSTRUCTIONS
Patient Education   Patient Education   Patient Education        Learning About Chemotherapy Side Effects and Safety  What is chemotherapy? Chemotherapy uses medicines to kill cancer cells. It's often called \"chemo. \" Chemo may slow cancer growth, stop cancer from spreading, or help get rid of the cancer. Chemo can be given at different locations, such as a hospital, a doctor's office, or a clinic. Sometimes chemo treatments may be done at home. You may get chemo in \"cycles. \" This means that you get a number of treatments over a set period of time. Then you take a break before you start again. Chemo helps to treat many kinds of cancer. But it can also affect healthy cells along with the cancer cells. This is why some types of chemo cause side effects, like nausea, losing your hair, or feeling tired. What are the side effects of chemotherapy? Side effects depend on which medicines you take, how much you take, and how the medicines affect you. Your doctor can tell you what to expect when you take these medicines. Some of them may cause symptoms such as:  · Fatigue. · Nausea. · Vomiting. · A rash. · Hair loss. · Pain or tingling in your hands or the soles of your feet. Chemo treatment can be hard. It can keep you from doing the things you were doing every day, like going to work or school. But keep in mind that most side effects don't last. They will go away after you finish the treatment. And many side effects can be managed with medicine. Your doctor will tell you what to do if you have side effects. Justen Pea also learn which ones you need to tell your doctor about right away. How can you use chemotherapy safely? Chemo medicines can stay in your body fluids (vomit, urine, or stool) for several days. These medicines contain strong chemicals, so they can be harmful if someone touches waste from your body.   If you take chemo at home, any of your clothes or bed linens or cloth diapers that have medicine or body fluids on them need to be handled separately from other laundry. Have caregivers use gloves when they wash your bed linens and clothes. Bed linens and cloth diapers should be machine washed twice in hot water, using regular detergent. Keep the medicine out of the reach of children. When using the bathroom during the first 48 hours after each treatment:  · Sit on the toilet seat to prevent splashing. · Put the toilet lid down before you flush. · Always flush twice after you use the toilet. Couples should use a condom during sex while a partner is getting chemo treatments and for several days after treatment ends. Follow-up care is a key part of your treatment and safety. Be sure to make and go to all appointments, and call your doctor if you are having problems. It's also a good idea to know your test results and keep a list of the medicines you take. Where can you learn more? Go to http://www.gray.com/  Enter Z450 in the search box to learn more about \"Learning About Chemotherapy Side Effects and Safety. \"  Current as of: December 17, 2020               Content Version: 12.8  © 2791-2944 [employer]. Care instructions adapted under license by [employer] (which disclaims liability or warranty for this information). If you have questions about a medical condition or this instruction, always ask your healthcare professional. [address] any warranty or liability for your use of this information. Learning About Chemo Brain  What is chemo brain? Chemo brain is a problem with thinking and memory that can happen during and especially after chemotherapy treatment for cancer. It can make it hard for you to think, concentrate, and do tasks. You may have trouble remembering things. And you may feel like your brain isn't working right. It can be frightening to have this happen, especially during an already stressful time.  These problems can be mild. But they can be so serious that people have a hard time working or doing their daily activities. What causes chemo brain? These thinking and memory problems may be caused by chemotherapy medicines used to treat cancer. They could occur because of the cancer itself and maybe because of other medicines used to treat cancer. The anxiety and stress of having cancer also may make it harder to think and remember. What are the symptoms? Symptoms vary depending on the person. But you may:  · Forget events, names, or other things. · Have trouble thinking of certain words when you talk. · Have trouble learning new things. · Take longer to do routine tasks. · Have trouble concentrating or feel like your mind is in a fog. How is chemo brain treated? Chemo brain may go away when treatment ends. If your symptoms are mild, they may go away without treatment. If your symptoms are very bad, your doctor may suggest that you see a specialist who is an expert in thinking and memory problems. What can you do to cope? Take care of yourself  · Try to relax to reduce your stress. Meditate, or do yoga or another relaxing activity. · Try to be patient with yourself. The problem may go away with time. · Get plenty of sleep. · Eat a healthy diet. · Be as physically active as you can. But check with your doctor to make sure that you don't do too much too soon. · Keep your brain active by reading and doing puzzles, games, or crosswords. Use memory aids  · Use sticky notes and calendars to help you remember events and tasks. · Carry a notebook to write down important dates, to-do lists, and names of people. · Try to have a routine for daily tasks so you get used to doing the same things in the same order every day. · Bring a family member or friend to doctor visits. Or use your phone or another device to record your talk with your doctor.   · Keep a diary or journal. Write down when your mind feels the most clear and when you have trouble. Note how much sleep you had, if you were stressed, or other things that happened. Seek support  · Tell your family and close friends about the problem so they know what's going on if you forget words or seem foggy. Suggest ways they can help you. · See an oncology social worker if you are having trouble coping with memory problems. · Think about joining a support group for people in cancer treatment. They may have the same problems. You can share ideas. Follow-up care is a key part of your treatment and safety. Be sure to make and go to all appointments, and call your doctor if you are having problems. It's also a good idea to know your test results and keep a list of the medicines you take. Where can you learn more? Go to http://www.gray.com/  Enter P155 in the search box to learn more about \"Learning About Chemo Brain. \"  Current as of: December 17, 2020               Content Version: 12.8  © 2006-2021 [employer]. Care instructions adapted under license by [employer] (which disclaims liability or warranty for this information). If you have questions about a medical condition or this instruction, always ask your healthcare professional. [place] [address] any warranty or liability for your use of this information. Pembrolizumab (Keytruda) - (By injection)   Why this medicine is used:   Treats cancer.   Contact a nurse or doctor right away if you have:  · Blistering, peeling, or red skin rash  · Chest tightness, trouble breathing, cough  · Dark urine or pale stools, yellow skin or eyes, bloody or cloudy urine, swelling  · Increased hunger or thirst, dry mouth, sweating, changes in amount of urine  · Lightheadedness, dizziness, fainting, joint or muscle pain  · Weakness, headaches, tiredness, weight changes, feeling cold  · Nausea, vomiting, loss of appetite, stomach pain, bloody or black, tarry stools, diarrhea  · Fever, chills, shaking, rash, itchy skin, blurred vision or vision changes   © 2017 [facility] Information is for End User's use only and may not be sold, redistributed or otherwise used for commercial purposes.

## 2021-07-28 NOTE — ADT AUTH CERT NOTES
URGENT REQUEST:       REF# [id number]  PLEASE SEND FAX OR CALL WITH UPDATE OF DAYS APPROVED AND STATUS OF P2P DC SUMMARY HAS BEEN SUBMITTED ON 7/16     [facility]     [phone] [address] [place] [address]  [phone] PHONE

## 2021-08-05 ENCOUNTER — APPOINTMENT (OUTPATIENT)
Dept: INFUSION THERAPY | Age: 86
End: 2021-08-05

## 2021-08-09 ENCOUNTER — TELEPHONE (OUTPATIENT)
Dept: ONCOLOGY | Age: 86
End: 2021-08-09

## 2021-08-09 ENCOUNTER — HOSPITAL ENCOUNTER (OUTPATIENT)
Dept: INFUSION THERAPY | Age: 86
End: 2021-08-09

## 2021-08-09 NOTE — TELEPHONE ENCOUNTER
Sharron with [facility], called patient to inquire as to not showing up for Chemo Appointment. She was told by family patient passed away two weeks ago.

## 2021-08-26 ENCOUNTER — APPOINTMENT (OUTPATIENT)
Dept: INFUSION THERAPY | Age: 86
End: 2021-08-26

## 2021-08-30 ENCOUNTER — HOSPITAL ENCOUNTER (OUTPATIENT)
Dept: INFUSION THERAPY | Age: 86
End: 2021-08-30

## 2021-09-16 ENCOUNTER — APPOINTMENT (OUTPATIENT)
Dept: INFUSION THERAPY | Age: 86
End: 2021-09-16

## 2021-09-20 ENCOUNTER — APPOINTMENT (OUTPATIENT)
Dept: INFUSION THERAPY | Age: 86
End: 2021-09-20

## 2022-03-18 PROBLEM — I67.89 CEREBRAL MICROVASCULAR DISEASE: Status: ACTIVE | Noted: 2017-07-19

## 2022-03-19 PROBLEM — I65.23 STENOSIS OF BOTH INTERNAL CAROTID ARTERIES: Status: ACTIVE | Noted: 2017-07-19

## 2022-03-19 PROBLEM — G93.89 BRAIN MASS: Status: ACTIVE | Noted: 2021-07-02

## 2022-03-19 PROBLEM — C79.31 MALIGNANT MELANOMA METASTATIC TO BRAIN (HCC): Status: ACTIVE | Noted: 2021-07-12

## 2022-10-13 NOTE — PATIENT INSTRUCTIONS
Hand Sprain: Care Instructions  Your Care Instructions  A hand sprain occurs when you stretch or tear a ligament in your hand. Ligaments are the tough tissues that connect one bone to another. Most hand sprains will heal with treatment you can do at home. Follow-up care is a key part of your treatment and safety. Be sure to make and go to all appointments, and call your doctor if you are having problems. It's also a good idea to know your test results and keep a list of the medicines you take. How can you care for yourself at home? · If your doctor gave you a splint or immobilizer, wear it as directed. This will help keep swelling down and help your hand heal.  · Follow your doctor's directions for exercise and other activity. · For the first 2 days after your injury, avoid things that might increase swelling, such as hot showers, hot tubs, or hot packs. · Put ice or a cold pack on your hand for 10 to 20 minutes at a time to stop swelling. Try this every 1 to 2 hours for 3 days (when you are awake) or until the swelling goes down. Put a thin cloth between the ice pack and your skin. Keep your splint dry. · After 2 or 3 days, if your swelling is gone, put a heating pad (set on low) or a warm cloth on your hand. Some experts suggest that you go back and forth between hot and cold treatments. · Prop up your hand on a pillow when you ice it or anytime you sit or lie down. Try to keep it above the level of your heart. This will help reduce swelling. · Take pain medicines exactly as directed. ¨ If the doctor gave you a prescription medicine for pain, take it as prescribed. ¨ If you are not taking a prescription pain medicine, ask your doctor if you can take an over-the-counter medicine. · Return to your usual level of activity slowly. When should you call for help? Call your doctor now or seek immediate medical care if:  · Your pain is worse. · You have new or increased swelling in your hand.   · You cannot Spoke with Dr. Mendez regarding giving the flu shot along with 1st covid vaccine and she advised that it would be better to wait in case of reactions, mom agreed and will come back in 2 weeks to get covid vaccine.   move your hand. · You have tingling, weakness, or numbness in your hand or fingers. · Your hand or fingers are cool or pale or change color. · You have a fever. · Your hand or fingers are red. Watch closely for changes in your health, and be sure to contact your doctor if:  · Your hand does not get better as expected. Where can you learn more? Go to http://yasmani-carloz.info/. Enter B015 in the search box to learn more about \"Hand Sprain: Care Instructions. \"  Current as of: March 21, 2017  Content Version: 11.3  © 5239-5748 [employer]. Care instructions adapted under license by [employer] (which disclaims liability or warranty for this information). If you have questions about a medical condition or this instruction, always ask your healthcare professional. [address] any warranty or liability for your use of this information.

## 2023-02-27 NOTE — PROGRESS NOTES
Chemo care information on [place] reviewed with Patient and daughter. Side effects/symptom management reviewed. Provided and reviewed chemo packet, and after hours contact information. Questions answered. Consent for Pallative treatment obtained at this time.
Render Risk Assessment In Note?: no
Additional Notes: Pared down wart with persona blade
Detail Level: Detailed

## 2023-05-22 RX ORDER — ATORVASTATIN CALCIUM 20 MG/1
1 TABLET, FILM COATED ORAL NIGHTLY
COMMUNITY
Start: 2019-08-23

## 2023-05-22 RX ORDER — PANTOPRAZOLE SODIUM 40 MG/1
40 TABLET, DELAYED RELEASE ORAL
COMMUNITY
Start: 2021-07-16

## (undated) DEVICE — REM POLYHESIVE ADULT PATIENT RETURN ELECTRODE: Brand: VALLEYLAB

## (undated) DEVICE — BASIN EMSIS 16OZ GRAPHITE PLAS KID SHP MOLD GRAD FOR ORAL

## (undated) DEVICE — SUTURE VCRL SZ 0 L18IN ABSRB VLT L36MM CT-1 1/2 CIR J740D

## (undated) DEVICE — ELECTRODE,RADIOTRANSLUCENT,FOAM,5PK: Brand: MEDLINE

## (undated) DEVICE — STERILE POLYISOPRENE POWDER-FREE SURGICAL GLOVES: Brand: PROTEXIS

## (undated) DEVICE — SYR 10ML LUER LOK 1/5ML GRAD --

## (undated) DEVICE — MARKER SKIN XR REFLCT LF SPHR DISP

## (undated) DEVICE — 1200 GUARD II KIT W/5MM TUBE W/O VAC TUBE: Brand: GUARDIAN

## (undated) DEVICE — CATH IV AUTOGRD BC PNK 20GA 25 -- INSYTE

## (undated) DEVICE — KIT EVAC 0.13IN RECT TB DIA10FR 400CC PVC 3 SPR Y CONN DRN

## (undated) DEVICE — Device

## (undated) DEVICE — TOWEL 4 PLY TISS 19X30 SUE WHT

## (undated) DEVICE — SET ADMIN 16ML TBNG L100IN 2 Y INJ SITE IV PIGGY BK DISP

## (undated) DEVICE — CODMAN® SURGICAL PATTIES 1/2" X 3" (1.27CM X 7.62CM): Brand: CODMAN®

## (undated) DEVICE — SOLIDIFIER FLD 2OZ 1500CC N DISINF IN BTL DISP SAFESORB

## (undated) DEVICE — SOLUTION IRRIG 1000ML 0.9% SOD CHL USP POUR PLAS BTL

## (undated) DEVICE — SNARE ENDOSCP M L240CM W27MM SHTH DIA2.4MM CHN 2.8MM OVL

## (undated) DEVICE — NEEDLE HYPO 18GA L1.5IN PNK S STL HUB POLYPR SHLD REG BVL

## (undated) DEVICE — HANDLE LT SNAP ON ULT DURABLE LENS FOR TRUMPF ALC DISPOSABLE

## (undated) DEVICE — TOOL F2/8TA23 LEGEND 8CM 2.3MM TAPER: Brand: MIDAS REX™

## (undated) DEVICE — CONTAINER SPEC 20 ML LID NEUT BUFF FORMALIN 10 % POLYPR STS

## (undated) DEVICE — SINGLE-USE BIOPSY FORCEPS: Brand: RADIAL JAW 4

## (undated) DEVICE — DRAPE FLD WRM W44XL66IN C6L FOR INTRATEMP SYS THERMABASIN

## (undated) DEVICE — SOLUTION IV 250ML 0.9% SOD CHL CLR INJ FLX BG CONT PRT CLSR

## (undated) DEVICE — FLOSEAL HEMOSTATIC MATRIX, 10ML: Brand: FLOSEAL HEMOSTATIC MATRIX

## (undated) DEVICE — NEONATAL-ADULT SPO2 SENSOR: Brand: NELLCOR

## (undated) DEVICE — NON-REM POLYHESIVE PATIENT RETURN ELECTRODE: Brand: VALLEYLAB

## (undated) DEVICE — STOCKINETTE,DOUBLE PLY,6X54,STERILE: Brand: MEDLINE

## (undated) DEVICE — AGENT HEMSTAT W2XL14IN OXIDIZED REGENERATED CELOS ABSRB FOR

## (undated) DEVICE — SURGIFOAM SPNG SZ 100

## (undated) DEVICE — SUTURE NRLN SZ 4-0 L18IN NONABSORBABLE BLK L13MM TF 1/2 CIR C584D

## (undated) DEVICE — TOOL 8TD126 LEGEND 8CM 1.2MM 6MM DEPTH: Brand: MIDAS REX ™

## (undated) DEVICE — SYR 3ML LL TIP 1/10ML GRAD --

## (undated) DEVICE — Z DISCONTINUED PER MEDLINE LINE GAS SAMPLING O2/CO2 LNG AD 13 FT NSL W/ TBNG FILTERLINE

## (undated) DEVICE — SURGICAL PROCEDURE KIT CRANIOTOMY

## (undated) DEVICE — SYR 20ML LL STRL LF --

## (undated) DEVICE — YANKAUER,TAPERED BULBOUS TIP,W/O VENT: Brand: MEDLINE

## (undated) DEVICE — GARMENT,MEDLINE,DVT,INT,CALF,MED, GEN2: Brand: MEDLINE

## (undated) DEVICE — BAG SPEC BIOHZRD 10 X 10 IN --

## (undated) DEVICE — CANNULA INJ L2.5IN BLNT TIP 3MM CLR BODY W/ 1 W VLV DLP

## (undated) DEVICE — SUTURE VCRL SZ 2-0 L18IN ABSRB UD L26MM CP-2 1/2 CIR REV J762D

## (undated) DEVICE — UNDERGLOVE SURG SZ 8 FNGR THK0.21MIL GRN LTX BEAD CUF

## (undated) DEVICE — TOOL 8TA11 LEGEND 8CM 1.1MM TA: Brand: MIDAS REX ™

## (undated) DEVICE — STRAINER URIN CALC RNL MSH -- CONVERT TO ITEM 357634

## (undated) DEVICE — FORCEPS BX L160CM DIA8MM GRSP DISECT CUP TIP NONLOCKING ROT

## (undated) DEVICE — TRAP,MUCUS SPECIMEN, 80CC: Brand: MEDLINE

## (undated) DEVICE — BLOCK BITE ENDOSCP AD 21 MM W/ DIL BLU LF DISP

## (undated) DEVICE — SYRINGE 50ML E/T

## (undated) DEVICE — PREP SKN DURAPREP 26ML APPL --